# Patient Record
Sex: FEMALE | Race: OTHER | HISPANIC OR LATINO | URBAN - METROPOLITAN AREA
[De-identification: names, ages, dates, MRNs, and addresses within clinical notes are randomized per-mention and may not be internally consistent; named-entity substitution may affect disease eponyms.]

---

## 2021-07-27 ENCOUNTER — INPATIENT (INPATIENT)
Facility: HOSPITAL | Age: 71
LOS: 16 days | Discharge: HOSPICE | DRG: 698 | End: 2021-08-13
Attending: INTERNAL MEDICINE | Admitting: INTERNAL MEDICINE
Payer: MEDICARE

## 2021-07-27 VITALS
DIASTOLIC BLOOD PRESSURE: 77 MMHG | HEART RATE: 101 BPM | SYSTOLIC BLOOD PRESSURE: 166 MMHG | TEMPERATURE: 98 F | HEIGHT: 59 IN | WEIGHT: 106.04 LBS | RESPIRATION RATE: 16 BRPM | OXYGEN SATURATION: 96 %

## 2021-07-27 DIAGNOSIS — D64.9 ANEMIA, UNSPECIFIED: ICD-10-CM

## 2021-07-27 LAB
ALBUMIN SERPL ELPH-MCNC: 3.6 G/DL — SIGNIFICANT CHANGE UP (ref 3.3–5)
ALP SERPL-CCNC: 164 U/L — HIGH (ref 40–120)
ALT FLD-CCNC: 19 U/L — SIGNIFICANT CHANGE UP (ref 10–45)
ANION GAP SERPL CALC-SCNC: 15 MMOL/L — SIGNIFICANT CHANGE UP (ref 5–17)
APPEARANCE UR: ABNORMAL
APTT BLD: 31.4 SEC — SIGNIFICANT CHANGE UP (ref 27.5–35.5)
AST SERPL-CCNC: 35 U/L — SIGNIFICANT CHANGE UP (ref 10–40)
BACTERIA # UR AUTO: NEGATIVE — SIGNIFICANT CHANGE UP
BASOPHILS # BLD AUTO: 0 K/UL — SIGNIFICANT CHANGE UP (ref 0–0.2)
BASOPHILS NFR BLD AUTO: 0 % — SIGNIFICANT CHANGE UP (ref 0–2)
BILIRUB SERPL-MCNC: 0.1 MG/DL — LOW (ref 0.2–1.2)
BILIRUB UR-MCNC: NEGATIVE — SIGNIFICANT CHANGE UP
BLD GP AB SCN SERPL QL: NEGATIVE — SIGNIFICANT CHANGE UP
BUN SERPL-MCNC: 29 MG/DL — HIGH (ref 7–23)
CALCIUM SERPL-MCNC: 9.8 MG/DL — SIGNIFICANT CHANGE UP (ref 8.4–10.5)
CHLORIDE SERPL-SCNC: 99 MMOL/L — SIGNIFICANT CHANGE UP (ref 96–108)
CO2 SERPL-SCNC: 19 MMOL/L — LOW (ref 22–31)
COLOR SPEC: ABNORMAL
CREAT SERPL-MCNC: 1.37 MG/DL — HIGH (ref 0.5–1.3)
DIFF PNL FLD: ABNORMAL
EOSINOPHIL # BLD AUTO: 0.1 K/UL — SIGNIFICANT CHANGE UP (ref 0–0.5)
EOSINOPHIL NFR BLD AUTO: 3 % — SIGNIFICANT CHANGE UP (ref 0–6)
EPI CELLS # UR: 3 /HPF — SIGNIFICANT CHANGE UP
GAS PNL BLDV: SIGNIFICANT CHANGE UP
GLUCOSE SERPL-MCNC: 171 MG/DL — HIGH (ref 70–99)
GLUCOSE UR QL: ABNORMAL
HCT VFR BLD CALC: 13.5 % — CRITICAL LOW (ref 34.5–45)
HGB BLD-MCNC: 4.3 G/DL — CRITICAL LOW (ref 11.5–15.5)
HYALINE CASTS # UR AUTO: 465 /LPF — HIGH (ref 0–2)
INR BLD: 1.06 RATIO — SIGNIFICANT CHANGE UP (ref 0.88–1.16)
KETONES UR-MCNC: NEGATIVE — SIGNIFICANT CHANGE UP
LEUKOCYTE ESTERASE UR-ACNC: ABNORMAL
LYMPHOCYTES # BLD AUTO: 0.39 K/UL — LOW (ref 1–3.3)
LYMPHOCYTES # BLD AUTO: 12 % — LOW (ref 13–44)
MCHC RBC-ENTMCNC: 31.9 GM/DL — LOW (ref 32–36)
MCHC RBC-ENTMCNC: 33.1 PG — SIGNIFICANT CHANGE UP (ref 27–34)
MCV RBC AUTO: 103.8 FL — HIGH (ref 80–100)
MONOCYTES # BLD AUTO: 0.52 K/UL — SIGNIFICANT CHANGE UP (ref 0–0.9)
MONOCYTES NFR BLD AUTO: 16 % — HIGH (ref 2–14)
NEUTROPHILS # BLD AUTO: 2.22 K/UL — SIGNIFICANT CHANGE UP (ref 1.8–7.4)
NEUTROPHILS NFR BLD AUTO: 67 % — SIGNIFICANT CHANGE UP (ref 43–77)
NITRITE UR-MCNC: NEGATIVE — SIGNIFICANT CHANGE UP
PH UR: 8.5 — HIGH (ref 5–8)
PLATELET # BLD AUTO: 147 K/UL — LOW (ref 150–400)
POTASSIUM SERPL-MCNC: 5.3 MMOL/L — SIGNIFICANT CHANGE UP (ref 3.5–5.3)
POTASSIUM SERPL-SCNC: 5.3 MMOL/L — SIGNIFICANT CHANGE UP (ref 3.5–5.3)
PROT SERPL-MCNC: 7.9 G/DL — SIGNIFICANT CHANGE UP (ref 6–8.3)
PROT UR-MCNC: ABNORMAL
PROTHROM AB SERPL-ACNC: 12.7 SEC — SIGNIFICANT CHANGE UP (ref 10.6–13.6)
RBC # BLD: 1.3 M/UL — LOW (ref 3.8–5.2)
RBC # FLD: 20.8 % — HIGH (ref 10.3–14.5)
RBC CASTS # UR COMP ASSIST: 3591 /HPF — HIGH (ref 0–4)
RH IG SCN BLD-IMP: POSITIVE — SIGNIFICANT CHANGE UP
SARS-COV-2 RNA SPEC QL NAA+PROBE: SIGNIFICANT CHANGE UP
SODIUM SERPL-SCNC: 133 MMOL/L — LOW (ref 135–145)
SP GR SPEC: 1.02 — SIGNIFICANT CHANGE UP (ref 1.01–1.02)
UROBILINOGEN FLD QL: NEGATIVE — SIGNIFICANT CHANGE UP
WBC # BLD: 3.22 K/UL — LOW (ref 3.8–10.5)
WBC # FLD AUTO: 3.22 K/UL — LOW (ref 3.8–10.5)
WBC UR QL: 20 /HPF — HIGH (ref 0–5)

## 2021-07-27 PROCEDURE — 74178 CT ABD&PLV WO CNTR FLWD CNTR: CPT | Mod: 26

## 2021-07-27 PROCEDURE — 76830 TRANSVAGINAL US NON-OB: CPT | Mod: 26

## 2021-07-27 PROCEDURE — 93010 ELECTROCARDIOGRAM REPORT: CPT

## 2021-07-27 PROCEDURE — 99222 1ST HOSP IP/OBS MODERATE 55: CPT

## 2021-07-27 PROCEDURE — 51700 IRRIGATION OF BLADDER: CPT

## 2021-07-27 PROCEDURE — 99285 EMERGENCY DEPT VISIT HI MDM: CPT | Mod: 25

## 2021-07-27 PROCEDURE — 76856 US EXAM PELVIC COMPLETE: CPT | Mod: 26

## 2021-07-27 RX ORDER — LATANOPROST 0.05 MG/ML
1 SOLUTION/ DROPS OPHTHALMIC; TOPICAL AT BEDTIME
Refills: 0 | Status: DISCONTINUED | OUTPATIENT
Start: 2021-07-27 | End: 2021-08-13

## 2021-07-27 RX ORDER — VENLAFAXINE HCL 75 MG
75 CAPSULE, EXT RELEASE 24 HR ORAL DAILY
Refills: 0 | Status: DISCONTINUED | OUTPATIENT
Start: 2021-07-27 | End: 2021-07-30

## 2021-07-27 RX ORDER — POLYETHYLENE GLYCOL 3350 17 G/17G
17 POWDER, FOR SOLUTION ORAL DAILY
Refills: 0 | Status: DISCONTINUED | OUTPATIENT
Start: 2021-07-27 | End: 2021-07-30

## 2021-07-27 RX ORDER — ACETAMINOPHEN 500 MG
2 TABLET ORAL
Qty: 0 | Refills: 0 | DISCHARGE

## 2021-07-27 RX ORDER — DEXTROSE 50 % IN WATER 50 %
25 SYRINGE (ML) INTRAVENOUS ONCE
Refills: 0 | Status: DISCONTINUED | OUTPATIENT
Start: 2021-07-27 | End: 2021-08-13

## 2021-07-27 RX ORDER — GLUCAGON INJECTION, SOLUTION 0.5 MG/.1ML
1 INJECTION, SOLUTION SUBCUTANEOUS ONCE
Refills: 0 | Status: DISCONTINUED | OUTPATIENT
Start: 2021-07-27 | End: 2021-08-13

## 2021-07-27 RX ORDER — DEXTROSE 50 % IN WATER 50 %
15 SYRINGE (ML) INTRAVENOUS ONCE
Refills: 0 | Status: DISCONTINUED | OUTPATIENT
Start: 2021-07-27 | End: 2021-08-13

## 2021-07-27 RX ORDER — ACETAMINOPHEN 500 MG
650 TABLET ORAL EVERY 6 HOURS
Refills: 0 | Status: DISCONTINUED | OUTPATIENT
Start: 2021-07-27 | End: 2021-08-13

## 2021-07-27 RX ORDER — POLYETHYLENE GLYCOL 3350 17 G/17G
17 POWDER, FOR SOLUTION ORAL
Qty: 0 | Refills: 0 | DISCHARGE

## 2021-07-27 RX ORDER — MIRTAZAPINE 45 MG/1
15 TABLET, ORALLY DISINTEGRATING ORAL AT BEDTIME
Refills: 0 | Status: DISCONTINUED | OUTPATIENT
Start: 2021-07-27 | End: 2021-07-30

## 2021-07-27 RX ORDER — PANTOPRAZOLE SODIUM 20 MG/1
1 TABLET, DELAYED RELEASE ORAL
Qty: 0 | Refills: 0 | DISCHARGE

## 2021-07-27 RX ORDER — SODIUM CHLORIDE 9 MG/ML
1000 INJECTION, SOLUTION INTRAVENOUS
Refills: 0 | Status: DISCONTINUED | OUTPATIENT
Start: 2021-07-27 | End: 2021-08-13

## 2021-07-27 RX ORDER — DEXTROSE 50 % IN WATER 50 %
12.5 SYRINGE (ML) INTRAVENOUS ONCE
Refills: 0 | Status: DISCONTINUED | OUTPATIENT
Start: 2021-07-27 | End: 2021-08-13

## 2021-07-27 RX ORDER — INSULIN GLARGINE 100 [IU]/ML
10 INJECTION, SOLUTION SUBCUTANEOUS AT BEDTIME
Refills: 0 | Status: DISCONTINUED | OUTPATIENT
Start: 2021-07-28 | End: 2021-08-02

## 2021-07-27 RX ORDER — MIDODRINE HYDROCHLORIDE 2.5 MG/1
10 TABLET ORAL THREE TIMES A DAY
Refills: 0 | Status: DISCONTINUED | OUTPATIENT
Start: 2021-07-27 | End: 2021-07-30

## 2021-07-27 RX ORDER — SUCRALFATE 1 G
1 TABLET ORAL
Refills: 0 | Status: DISCONTINUED | OUTPATIENT
Start: 2021-07-27 | End: 2021-07-30

## 2021-07-27 RX ORDER — PANTOPRAZOLE SODIUM 20 MG/1
40 TABLET, DELAYED RELEASE ORAL
Refills: 0 | Status: DISCONTINUED | OUTPATIENT
Start: 2021-07-27 | End: 2021-07-30

## 2021-07-27 RX ORDER — BRIMONIDINE TARTRATE 2 MG/MG
1 SOLUTION/ DROPS OPHTHALMIC
Qty: 0 | Refills: 0 | DISCHARGE

## 2021-07-27 RX ORDER — BRIMONIDINE TARTRATE 2 MG/MG
1 SOLUTION/ DROPS OPHTHALMIC
Refills: 0 | Status: DISCONTINUED | OUTPATIENT
Start: 2021-07-27 | End: 2021-08-13

## 2021-07-27 RX ORDER — BIMATOPROST 0.3 MG/ML
1 SOLUTION/ DROPS OPHTHALMIC
Qty: 0 | Refills: 0 | DISCHARGE

## 2021-07-27 RX ORDER — INSULIN LISPRO 100/ML
VIAL (ML) SUBCUTANEOUS
Refills: 0 | Status: DISCONTINUED | OUTPATIENT
Start: 2021-07-27 | End: 2021-07-30

## 2021-07-27 NOTE — CONSULT NOTE ADULT - ASSESSMENT
69 yo Female Jehovah witness with PMHx of breast CA s/p BL mastectomy with recurrence and metastasis here with c/o vaginal bleeding, found to have bladder hematoma on pelvic ultrasound. Pt also with R sided nephU with mild hydro, but pt unsure indication for nephU. H/H 4.3/13.5, SCr 1.37. Pt follows up at [facility] and is currently receiving chemotherapy.     Recs:  - no acute urologic intervention at this time  - please obtain CT urogram  - trend H/H, SCr  - will follow   71 yo Female Jehovah witness with PMHx of breast CA s/p BL mastectomy with recurrence and metastasis here with c/o vaginal bleeding, found to have bladder hematoma on pelvic ultrasound. Pt also with R sided nephU with mild hydro, but pt unsure indication for nephU. H/H 4.3/13.5, SCr 1.37. Pt follows up at [facility] and is currently receiving chemotherapy.     Recs:  - no acute urologic intervention at this time  - please obtain CT urogram  - trend H/H, SCr  - attempt to obtain records from [facility]  - will follow   71 yo Female Jehovah witness with PMHx of breast CA s/p BL mastectomy with recurrence and metastasis here with c/o vaginal bleeding, found to have bladder hematoma on pelvic ultrasound. Pt also with R sided nephU with mild hydro, but pt unsure indication for nephU. H/H 4.3/13.5, SCr 1.37. Pt follows up at [facility] and is currently receiving chemotherapy.     Recs:  - no acute urologic intervention at this time  - please obtain CT urogram  - trend H/H, SCr  - continue to monitor urine color  - bladder scan to ensure adequate drainage  - attempt to obtain records from [facility]  - will follow

## 2021-07-27 NOTE — ED PROVIDER NOTE - ATTENDING CONTRIBUTION TO CARE
attending Doris: 70yF Yazdanism, h/o breast Ca on chemo, anemia p/w reported vaginal bleeding. Unclear if vaginal or urinary source. Reportedly with hemoglobin of 4, seen at OSH for same and refused blood products. Will obtain labs, TVUS and admit.

## 2021-07-27 NOTE — H&P ADULT - NSHPPHYSICALEXAM_GEN_ALL_CORE
T(F): 99 (07-27-21 @ 20:31), Max: 99 (07-27-21 @ 20:31)  HR: 102 (07-27-21 @ 20:31) (100 - 102)  BP: 137/62 (07-27-21 @ 20:31) (98/70 - 166/77)  RR: 21 (07-27-21 @ 20:31) (16 - 21)  SpO2: 99% (07-27-21 @ 20:31) (96% - 100%)      PHYSICAL EXAM:  GENERAL: NAD, well-developed  HEAD:  Atraumatic, Normocephalic  EYES: EOMI, PERRLA, conjunctiva and sclera clear  NECK: Supple, No JVD  CHEST/LUNG: Clear to auscultation bilaterally; No wheeze  HEART: Regular rate and rhythm; No murmurs, rubs, or gallops  ABDOMEN: Soft, Nontender, Nondistended; Bowel sounds present  EXTREMITIES:  2+ Peripheral Pulses, No clubbing, cyanosis, or edema  PSYCH: AAOx3  NEUROLOGY: non-focal  SKIN: No rashes or lesions

## 2021-07-27 NOTE — CONSULT NOTE ADULT - SUBJECTIVE AND OBJECTIVE BOX
HPI:  Patient is a 70y Female Jehovah witness who 3 weeks ago started having what she thought was vaginal bleeding. She then states she went to [facility] recently but was discharged ; states they did not do any workup.  Her Hgb in the ER today is 4.3.  Patient had pelvic sono done today which showed no vaginal source but + hematoma in the bladder. The patient has been voiding out clots today.  The patient also has a right nephroureterostomy tube that is clamped, and she does not know why she has it, only that it was placed in March.    The patient is a very poor historian. She does relay some past history of having breast cancer that required mastectomies in 1997 and ?2009, then had a recurrence (? mets to lung) in 2017.  She also states that there was some spread to her abdomen, and has been getting chemo treatment at [facility].          PAST MEDICAL & SURGICAL HISTORY:   Breast cancer, s/p mastectomies, chemo    MEDICATIONS  (STANDING): unknown     MEDICATIONS  (PRN):    FAMILY HISTORY: noncontributory     Allergies    No Known Allergies    Intolerances      SOCIAL HISTORY:   Tobacco hx: denies smoking     REVIEW OF SYSTEMS: Pertinent positives and negatives as stated in HPI, otherwise negative    Vital signs  T(C): 36.7 (07-27-21 @ 15:32), Max: 36.7 (07-27-21 @ 14:35)  HR: 102 (07-27-21 @ 17:54)  BP: 98/70 (07-27-21 @ 17:54)  SpO2: 98% (07-27-21 @ 17:54)  Wt(kg): --      Physical Exam  Gen: NAD  Pulm: No respiratory distress, no subcostal retractions  Abd: Soft, NT, ND  : 6 eye catheter placed, no residual urine, irrigated with NS, about 25 cc of clot, otherwise urine clear  MSK: No edema present    LABS:        07-27 @ 15:39    WBC 3.22  / Hct 13.5  / SCr 1.37     07-27    133<L>  |  99  |  29<H>  ----------------------------<  171<H>  5.3   |  19<L>  |  1.37<H>    Ca    9.8      27 Jul 2021 15:39    TPro  7.9  /  Alb  3.6  /  TBili  0.1<L>  /  DBili  x   /  AST  35  /  ALT  19  /  AlkPhos  164<H>  07-27          Urine Cx:   Blood Cx:    RADIOLOGY:    < from: US Pelvis Complete (07.27.21 @ 16:20) >  IMPRESSION:    Hematoma and layering debris within the bladder in the context of right-sided nephroureterostomy and mild hydronephrosis.    No intrauterine pathology.    < end of copied text >   HPI:  Patient is a 70y Female Jehovah witness who 3 weeks ago started having what she thought was vaginal bleeding. She then states she went to [facility] recently but was discharged ; states they did not do any workup.  Her Hgb in the ER today is 4.3.  Patient had pelvic sono done today which showed no vaginal source but + hematoma in the bladder. The patient has been voiding out clots today.  The patient also has a right nephroureterostomy tube that is clamped, and she does not know why she has it, only that it was placed in March.    The patient is a very poor historian. She does relay some past history of having breast cancer that required mastectomies in 1997 and ?2009, then had a recurrence (? mets to lung) in 2017.  She also states that there was some spread to her abdomen, and has been getting chemo treatment at [facility].          PAST MEDICAL & SURGICAL HISTORY:   Breast cancer, s/p mastectomies, chemo    MEDICATIONS  (STANDING): unknown     MEDICATIONS  (PRN):    FAMILY HISTORY: noncontributory     Allergies    No Known Allergies    Intolerances      SOCIAL HISTORY:   Tobacco hx: denies smoking     REVIEW OF SYSTEMS: Pertinent positives and negatives as stated in HPI, otherwise negative    Vital signs  T(C): 36.7 (07-27-21 @ 15:32), Max: 36.7 (07-27-21 @ 14:35)  HR: 102 (07-27-21 @ 17:54)  BP: 98/70 (07-27-21 @ 17:54)  SpO2: 98% (07-27-21 @ 17:54)  Wt(kg): --      Physical Exam  Gen: NAD  Pulm: No respiratory distress, no subcostal retractions  Abd: Soft, NT, ND  : 6 eye catheter placed, no residual urine, irrigated with NS, about 25 cc of clot, otherwise urine clear  MSK: No edema present    LABS:        07-27 @ 15:39    WBC 3.22  / Hct 13.5  / SCr 1.37     07-27    133<L>  |  99  |  29<H>  ----------------------------<  171<H>  5.3   |  19<L>  |  1.37<H>    Ca    9.8      27 Jul 2021 15:39    TPro  7.9  /  Alb  3.6  /  TBili  0.1<L>  /  DBili  x   /  AST  35  /  ALT  19  /  AlkPhos  164<H>  07-27          Urine Cx:   Blood Cx:    RADIOLOGY:    < from: US Pelvis Complete (07.27.21 @ 16:20) >  IMPRESSION:    Hematoma and layering debris within the bladder in the context of right-sided nephroureterostomy and mild hydronephrosis.    No intrauterine pathology.    < end of copied text >    CT Urogram pending

## 2021-07-27 NOTE — ED ADULT NURSE NOTE - OBJECTIVE STATEMENT
Female 70 years old with past medical history of Anemia and Breast Cancer was brought in by EMS from [facility] for abnormal labs. Pt reports vaginal bleeding for 3 weeks, was admitted in [facility] with hgb of 5.7. Pt didn't want any blood transfusion because of her Gnosticist Jehova's Witness.. States they treat her with Epogen. Today she felt very weak, dizzy and short of breathe. Had vaginal bleeding with blood clots. Denies chest pain, fever, chills. nausea, vomiting or abdominal pain. This morning she had blood works done with hgb level of 4.5. Seen by MD. Will continue to reassess.

## 2021-07-27 NOTE — ED PROVIDER NOTE - CLINICAL SUMMARY MEDICAL DECISION MAKING FREE TEXT BOX
71 yo F Confucianism, hx of breast Ca on active chemo (unsure of name) and anemia p/w reported vaginal bleeding. Unclear if vaginal or urinary source. Unable to receive blood products despite Hgb of 4. Plan for repeat labs, TVUS and admit.

## 2021-07-27 NOTE — ED PROVIDER NOTE - NS ED ROS FT
Constitutional:  (-) fever, (-) chills, (+) fatigue.   Eyes:  (-) eye pain (-) visual changes.  ENMT: (-) nasal discharge, (-) sore throat. (-) neck pain or stiffness.  Cardiac: (-) chest pain (-) palpitations.  Respiratory:  (-) cough (-) shortness of breath.  GI:  (-) nausea (-) vomiting (-) diarrhea (-) abdominal pain.  :  (-) dysuria (-) frequency (-) burning.  MS:  (-) back pain (-) joint pain.  Neuro:  (-) headache (-) numbness (-) tingling (-) focal weakness.  Skin:  (-) rash.  Except as documented in the HPI,  all other systems are negative.

## 2021-07-27 NOTE — ED ADULT NURSE REASSESSMENT NOTE - NS ED NURSE REASSESS COMMENT FT1
Report received from KAYLEE cheema. Pt a & o x 4 , able to follow commands. Breathing spontaneous & nonlabored. Abdomen soft & nondistended. MD Starkey at bedside placing ultrasound guided IV. Urology MD at bedside discussing with patient and her HCP over the phone that pt does not want blood transfusion. Call bell within reach, bed in lowest position.

## 2021-07-27 NOTE — ED PROVIDER NOTE - PROGRESS NOTE DETAILS
Attending Philip: received sign out on this pt from Dr. George. 71 y/o F Jehova witness w/ hx of anemia presenting w/ concern for vaginal bleeding. TVUS today showing hematoma and debris in bladder w/ R sided nephrostomy tube. Uterus overall unremarkable. Resident speaking w/ urology now. Hb 4.3 today

## 2021-07-27 NOTE — H&P ADULT - HISTORY OF PRESENT ILLNESS
71 yo F Anabaptism, hx of breast Ca on active chemo (unsure of name) and anemia p/w reported vaginal bleeding. Recent admission to [facility] for the same, underwent EPO treatment and discharged. Reported Hgb of 5 during admission. Feeling generally weak, denies chest pain sob. Reports bright red blood in the toilet when she urinates. Denies dysuria, abdominal pain. Patient is unable to receive blood products of any kind.

## 2021-07-27 NOTE — ED PROVIDER NOTE - PHYSICAL EXAMINATION
Gen: NAD, AAOx3, pale non-toxic appearing.  HEENT: NCAT, normal conjunctiva, oral mucosa moist.  Lung: speaking in full sentences, good aeration bilaterally. lungs CTA b/l.  CV: regular rate and rhythm. cap refill >2+. peripheral pulses 2+bilaterally.  Abd: soft, ND, NT.  MSK: no visible deformities.  Neuro: No focal deficits.  Skin: Warm.  Psych: normal affect.

## 2021-07-28 LAB
A1C WITH ESTIMATED AVERAGE GLUCOSE RESULT: 6.5 % — HIGH (ref 4–5.6)
ANION GAP SERPL CALC-SCNC: 12 MMOL/L — SIGNIFICANT CHANGE UP (ref 5–17)
BUN SERPL-MCNC: 30 MG/DL — HIGH (ref 7–23)
CALCIUM SERPL-MCNC: 9.4 MG/DL — SIGNIFICANT CHANGE UP (ref 8.4–10.5)
CHLORIDE SERPL-SCNC: 96 MMOL/L — SIGNIFICANT CHANGE UP (ref 96–108)
CO2 SERPL-SCNC: 22 MMOL/L — SIGNIFICANT CHANGE UP (ref 22–31)
COVID-19 SPIKE DOMAIN AB INTERP: NEGATIVE — SIGNIFICANT CHANGE UP
COVID-19 SPIKE DOMAIN ANTIBODY RESULT: 0.4 U/ML — SIGNIFICANT CHANGE UP
CREAT SERPL-MCNC: 1.44 MG/DL — HIGH (ref 0.5–1.3)
ESTIMATED AVERAGE GLUCOSE: 140 MG/DL — HIGH (ref 68–114)
FERRITIN SERPL-MCNC: 492 NG/ML — HIGH (ref 15–150)
GLUCOSE BLDC GLUCOMTR-MCNC: 220 MG/DL — HIGH (ref 70–99)
GLUCOSE BLDC GLUCOMTR-MCNC: 242 MG/DL — HIGH (ref 70–99)
GLUCOSE BLDC GLUCOMTR-MCNC: 252 MG/DL — HIGH (ref 70–99)
GLUCOSE BLDC GLUCOMTR-MCNC: 286 MG/DL — HIGH (ref 70–99)
GLUCOSE SERPL-MCNC: 163 MG/DL — HIGH (ref 70–99)
HCT VFR BLD CALC: 11.2 % — CRITICAL LOW (ref 34.5–45)
HCV AB S/CO SERPL IA: 0.15 S/CO — SIGNIFICANT CHANGE UP (ref 0–0.99)
HCV AB SERPL-IMP: SIGNIFICANT CHANGE UP
HGB BLD-MCNC: 3.4 G/DL — CRITICAL LOW (ref 11.5–15.5)
IRON SATN MFR SERPL: 64 UG/DL — SIGNIFICANT CHANGE UP (ref 30–160)
MCHC RBC-ENTMCNC: 30.4 GM/DL — LOW (ref 32–36)
MCHC RBC-ENTMCNC: 32.7 PG — SIGNIFICANT CHANGE UP (ref 27–34)
MCV RBC AUTO: 107.7 FL — HIGH (ref 80–100)
NRBC # BLD: 0 /100 WBCS — SIGNIFICANT CHANGE UP (ref 0–0)
PLATELET # BLD AUTO: 122 K/UL — LOW (ref 150–400)
POTASSIUM SERPL-MCNC: 4.1 MMOL/L — SIGNIFICANT CHANGE UP (ref 3.5–5.3)
POTASSIUM SERPL-SCNC: 4.1 MMOL/L — SIGNIFICANT CHANGE UP (ref 3.5–5.3)
RBC # BLD: 1.04 M/UL — LOW (ref 3.8–5.2)
RBC # FLD: 21.1 % — HIGH (ref 10.3–14.5)
SARS-COV-2 IGG+IGM SERPL QL IA: 0.4 U/ML — SIGNIFICANT CHANGE UP
SARS-COV-2 IGG+IGM SERPL QL IA: NEGATIVE — SIGNIFICANT CHANGE UP
SODIUM SERPL-SCNC: 130 MMOL/L — LOW (ref 135–145)
WBC # BLD: 3.08 K/UL — LOW (ref 3.8–10.5)
WBC # FLD AUTO: 3.08 K/UL — LOW (ref 3.8–10.5)

## 2021-07-28 PROCEDURE — 99231 SBSQ HOSP IP/OBS SF/LOW 25: CPT

## 2021-07-28 PROCEDURE — 71250 CT THORAX DX C-: CPT | Mod: 26

## 2021-07-28 PROCEDURE — 51703 INSERT BLADDER CATH COMPLEX: CPT

## 2021-07-28 RX ORDER — ERYTHROPOIETIN 10000 [IU]/ML
40000 INJECTION, SOLUTION INTRAVENOUS; SUBCUTANEOUS ONCE
Refills: 0 | Status: COMPLETED | OUTPATIENT
Start: 2021-07-28 | End: 2021-07-28

## 2021-07-28 RX ORDER — OXYCODONE HYDROCHLORIDE 5 MG/1
5 TABLET ORAL EVERY 4 HOURS
Refills: 0 | Status: DISCONTINUED | OUTPATIENT
Start: 2021-07-28 | End: 2021-07-28

## 2021-07-28 RX ORDER — INSULIN LISPRO 100/ML
VIAL (ML) SUBCUTANEOUS AT BEDTIME
Refills: 0 | Status: DISCONTINUED | OUTPATIENT
Start: 2021-07-28 | End: 2021-07-30

## 2021-07-28 RX ORDER — IRON SUCROSE 20 MG/ML
200 INJECTION, SOLUTION INTRAVENOUS EVERY 24 HOURS
Refills: 0 | Status: COMPLETED | OUTPATIENT
Start: 2021-07-28 | End: 2021-08-01

## 2021-07-28 RX ORDER — ERYTHROMYCIN ETHYLSUCCINATE 400 MG
TABLET ORAL
Refills: 0 | Status: DISCONTINUED | OUTPATIENT
Start: 2021-07-28 | End: 2021-07-28

## 2021-07-28 RX ORDER — OXYCODONE HYDROCHLORIDE 5 MG/1
5 TABLET ORAL ONCE
Refills: 0 | Status: DISCONTINUED | OUTPATIENT
Start: 2021-07-28 | End: 2021-07-28

## 2021-07-28 RX ADMIN — MIDODRINE HYDROCHLORIDE 10 MILLIGRAM(S): 2.5 TABLET ORAL at 05:27

## 2021-07-28 RX ADMIN — Medication 3: at 13:19

## 2021-07-28 RX ADMIN — OXYCODONE HYDROCHLORIDE 5 MILLIGRAM(S): 5 TABLET ORAL at 14:44

## 2021-07-28 RX ADMIN — OXYCODONE HYDROCHLORIDE 5 MILLIGRAM(S): 5 TABLET ORAL at 22:52

## 2021-07-28 RX ADMIN — Medication 2: at 08:28

## 2021-07-28 RX ADMIN — OXYCODONE HYDROCHLORIDE 5 MILLIGRAM(S): 5 TABLET ORAL at 23:30

## 2021-07-28 RX ADMIN — PANTOPRAZOLE SODIUM 40 MILLIGRAM(S): 20 TABLET, DELAYED RELEASE ORAL at 05:28

## 2021-07-28 RX ADMIN — MIDODRINE HYDROCHLORIDE 10 MILLIGRAM(S): 2.5 TABLET ORAL at 11:10

## 2021-07-28 RX ADMIN — MIDODRINE HYDROCHLORIDE 10 MILLIGRAM(S): 2.5 TABLET ORAL at 17:49

## 2021-07-28 RX ADMIN — ERYTHROPOIETIN 40000 UNIT(S): 10000 INJECTION, SOLUTION INTRAVENOUS; SUBCUTANEOUS at 16:01

## 2021-07-28 RX ADMIN — Medication 75 MILLIGRAM(S): at 11:10

## 2021-07-28 RX ADMIN — Medication 3: at 18:18

## 2021-07-28 RX ADMIN — IRON SUCROSE 110 MILLIGRAM(S): 20 INJECTION, SOLUTION INTRAVENOUS at 16:01

## 2021-07-28 RX ADMIN — BRIMONIDINE TARTRATE 1 DROP(S): 2 SOLUTION/ DROPS OPHTHALMIC at 05:27

## 2021-07-28 RX ADMIN — Medication 1 GRAM(S): at 22:18

## 2021-07-28 RX ADMIN — Medication 1 GRAM(S): at 11:10

## 2021-07-28 RX ADMIN — Medication 1 GRAM(S): at 05:27

## 2021-07-28 RX ADMIN — BRIMONIDINE TARTRATE 1 DROP(S): 2 SOLUTION/ DROPS OPHTHALMIC at 17:50

## 2021-07-28 RX ADMIN — Medication 650 MILLIGRAM(S): at 11:09

## 2021-07-28 RX ADMIN — MIRTAZAPINE 15 MILLIGRAM(S): 45 TABLET, ORALLY DISINTEGRATING ORAL at 22:18

## 2021-07-28 RX ADMIN — LATANOPROST 1 DROP(S): 0.05 SOLUTION/ DROPS OPHTHALMIC; TOPICAL at 22:19

## 2021-07-28 RX ADMIN — INSULIN GLARGINE 10 UNIT(S): 100 INJECTION, SOLUTION SUBCUTANEOUS at 22:18

## 2021-07-28 RX ADMIN — Medication 650 MILLIGRAM(S): at 02:24

## 2021-07-28 NOTE — PROGRESS NOTE ADULT - SUBJECTIVE AND OBJECTIVE BOX
Delayed entry progress note -- patient seen and examined ~6-7am    Subjective  Denies fevers, chills. CBI on slow drip clear light red. Denies pain.  Hct 11.2 from 13.5    Objective    Vital signs  T(F): , Max: 99 (07-27-21 @ 20:31)  HR: 99 (07-28-21 @ 05:24)  BP: 117/60 (07-28-21 @ 05:24)  SpO2: 97% (07-28-21 @ 05:24)  Wt(kg): --    Output         Gen: NAD  Abd: soft, nontender, nondistended  : diaz secured in place, draining clear light red on slow gtt CBI    Labs      07-28 @ 07:28    WBC --    / Hct --    / SCr 1.44     07-28 @ 07:27    WBC 3.08  / Hct 11.2  / SCr --             Urine Cx: pending    Imaging  CTAP read pending

## 2021-07-28 NOTE — PROGRESS NOTE ADULT - ASSESSMENT
69 yo Female Jehovah witness with PMHx of breast CA s/p BL mastectomy with recurrence and metastasis here with c/o vaginal bleeding, found to have bladder hematoma on pelvic ultrasound. Pt also with R sided nephU with mild hydro, but pt unsure indication for nephU.   on admission: H/H 4.3/13.5, SCr 1.37.  Pt follows up at [facility] and is currently receiving chemotherapy.     7/28: Hct 11.2 from 13.5 yesterday. ptn is pale, lethargic, dyspneic, expresses wishes not to have any blood products based on her GD DOV. ptn placed her proxy on the phone Mr. Gray who wanted to confirm the hospital is respecting ptn's wishes. both ptn and the proxy aksed baout being transferred to [facility]. however ptn is not stable w HGB of 3 to be travelling. denies CP, palps, HA, has ongoing hematuria, CBI in place. Ct C/A/P reviewed. ptn w metastatic dz process on CT scan. Heme consult called. will cont CBI as per . pending CT urogram. cont trending HH. started on IV IRON. prognosis is guarded.

## 2021-07-28 NOTE — PROGRESS NOTE ADULT - SUBJECTIVE AND OBJECTIVE BOX
Patient is a 70y old  Female who presents with a chief complaint of severe anemia, acute blood loss 2/2 hematuria (2021 11:54)      SUBJECTIVE / OVERNIGHT EVENTS: ptn is pale, lethargic, dyspneic, expresses wishes not to have any blood products based on her GD JEHOVA. ptn placed her proxy on the phone Mr. Gray who wanted to confirm the hospital is respecting ptn's wishes. both ptn and the proxy aksed micheal being transferred to [facility]. however ptn is not stable w HGB of 3 to be travelling. denies CP, palps, HA, has ongoing hematuria, CBI in place. Ct C/A/P reviewed. ptn w metastatic dz process on CT scan    MEDICATIONS  (STANDING):  brimonidine 0.2% Ophthalmic Solution 1 Drop(s) Both EYES two times a day  dextrose 40% Gel 15 Gram(s) Oral once  dextrose 5%. 1000 milliLiter(s) (50 mL/Hr) IV Continuous <Continuous>  dextrose 5%. 1000 milliLiter(s) (100 mL/Hr) IV Continuous <Continuous>  dextrose 50% Injectable 25 Gram(s) IV Push once  dextrose 50% Injectable 12.5 Gram(s) IV Push once  dextrose 50% Injectable 25 Gram(s) IV Push once  glucagon  Injectable 1 milliGRAM(s) IntraMuscular once  insulin glargine Injectable (LANTUS) 10 Unit(s) SubCutaneous at bedtime  insulin lispro (ADMELOG) corrective regimen sliding scale   SubCutaneous three times a day before meals  insulin lispro (ADMELOG) corrective regimen sliding scale   SubCutaneous at bedtime  iron sucrose IVPB 200 milliGRAM(s) IV Intermittent every 24 hours  latanoprost 0.005% Ophthalmic Solution 1 Drop(s) Both EYES at bedtime  midodrine. 10 milliGRAM(s) Oral three times a day  mirtazapine 15 milliGRAM(s) Oral at bedtime  pantoprazole    Tablet 40 milliGRAM(s) Oral before breakfast  sucralfate 1 Gram(s) Oral four times a day  venlafaxine XR. 75 milliGRAM(s) Oral daily    MEDICATIONS  (PRN):  acetaminophen   Tablet .. 650 milliGRAM(s) Oral every 6 hours PRN Temp greater or equal to 38C (100.4F), Mild Pain (1 - 3)  artificial  tears Solution 1 Drop(s) Both EYES four times a day PRN Dry Eyes  oxyCODONE    IR 5 milliGRAM(s) Oral every 4 hours PRN Moderate Pain (4 - 6)  polyethylene glycol 3350 17 Gram(s) Oral daily PRN Constipation      Vital Signs Last 24 Hrs  T(F): 98.2 (21 @ 20:00), Max: 99.1 (21 @ 15:53)  HR: 93 (21 @ 20:00) (93 - 110)  BP: 157/73 (21 @ 20:00) (117/60 - 157/73)  RR: 19 (21 @ 20:00) (19 - 22)  SpO2: 99% (21 @ 20:00) (97% - 100%)  Telemetry:   CAPILLARY BLOOD GLUCOSE      POCT Blood Glucose.: 220 mg/dL (2021 21:55)  POCT Blood Glucose.: 252 mg/dL (2021 18:05)  POCT Blood Glucose.: 286 mg/dL (2021 12:40)  POCT Blood Glucose.: 242 mg/dL (2021 08:20)    I&O's Summary    2021 07:  -  2021 07:00  --------------------------------------------------------  IN: 400 mL / OUT: 0 mL / NET: 400 mL    2021 07:01  -  2021 23:16  --------------------------------------------------------  IN: 9000 mL / OUT: 25661 mL / NET: -2000 mL        PHYSICAL EXAM:  GENERAL: NAD, well-developed  HEAD:  Atraumatic, Normocephalic  EYES: EOMI, PERRLA, conjunctiva and sclera clear  NECK: Supple, No JVD  CHEST/LUNG: Clear to auscultation bilaterally; No wheeze  HEART: Regular rate and rhythm; No murmurs, rubs, or gallops  ABDOMEN: Soft, Nontender, Nondistended; Bowel sounds present  EXTREMITIES:  2+ Peripheral Pulses, No clubbing, cyanosis, or edema  PSYCH: AAOx3  NEUROLOGY: non-focal  SKIN: No rashes or lesions    LABS:                        3.4    3.08  )-----------( 122      ( 2021 07:27 )             11.2         130<L>  |  96  |  30<H>  ----------------------------<  163<H>  4.1   |  22  |  1.44<H>    Ca    9.4      2021 07:28    TPro  7.9  /  Alb  3.6  /  TBili  0.1<L>  /  DBili  x   /  AST  35  /  ALT  19  /  AlkPhos  164<H>  27    PT/INR - ( 2021 20:37 )   PT: 12.7 sec;   INR: 1.06 ratio         PTT - ( 2021 20:37 )  PTT:31.4 sec      Urinalysis Basic - ( 2021 21:49 )    Color: Red / Appearance: Turbid / S.018 / pH: x  Gluc: x / Ketone: Negative  / Bili: Negative / Urobili: Negative   Blood: x / Protein: 300 mg/dL / Nitrite: Negative   Leuk Esterase: Moderate / RBC: 3591 /hpf / WBC 20 /HPF   Sq Epi: x / Non Sq Epi: 3 /hpf / Bacteria: Negative        RADIOLOGY & ADDITIONAL TESTS:    Imaging Personally Reviewed:    Consultant(s) Notes Reviewed:      Care Discussed with Consultants/Other Providers:

## 2021-07-28 NOTE — PROGRESS NOTE ADULT - ASSESSMENT
71 yo Female Jehovah witness with PMHx of breast CA s/p BL mastectomy with recurrence and metastasis here with c/o vaginal bleeding, found to have bladder hematoma on pelvic ultrasound. Pt also with R sided nephU with mild hydro, but pt unsure indication for nephU. H/H 4.3/13.5, SCr 1.37. Pt follows up at [facility] and is currently receiving chemotherapy.     Hct 11.2 from 13.5 yesterday    Recs:  - continue CBI at this time, wean to clear light pink, manually irrigate clots prn (contact urology if unable to irrigate clot adequately)  - FU CT urogram read  - trend H/H, SCr  - please obtain records from [facility]

## 2021-07-28 NOTE — CONSULT NOTE ADULT - SUBJECTIVE AND OBJECTIVE BOX
HPI: Ms. Packer is a 70 year-old Congregational with history of anemia and breast cancer on chemotherapy, who presented 21 to the [facility] ER with vaginal bleeding. She is s/p recent admission at [facility] for a similar issue and was discharged after receiving a erythropoesis stimulating agent. Her hemoglobin was ~5g/dL at [place]; it is 4.3g/dL upon admission here. She was noted as well to have azotemia and proteinuria; therefore a renal consultation was requested.    PAST MEDICAL & SURGICAL HISTORY:  Anemia  Breast CA - on chemo  No significant past surgical history    Allergies  No Known Allergies    SOCIAL HISTORY:  Denies ETOh,Smoking,     FAMILY HISTORY:  No CKD    REVIEW OF SYSTEMS:  CONSTITUTIONAL: No weakness, fevers or chills  EYES/ENT: No visual changes;  No vertigo or throat pain   NECK: No pain or stiffness  RESPIRATORY: No cough, wheezing, hemoptysis; No shortness of breath  CARDIOVASCULAR: No chest pain or palpitations  GASTROINTESTINAL: No abdominal or epigastric pain. No nausea, vomiting, or hematemesis; No diarrhea or constipation. No melena or hematochezia.  GENITOURINARY: (+)vaginal bleeding  NEUROLOGICAL: No numbness or weakness  SKIN: No itching, burning, rashes, or lesions   All other review of systems is negative unless indicated above.    VITAL:  T(C): , Max: 37.2 (21 @ 20:31)  T(F): , Max: 99 (21 @ 20:31)  HR: 99 (21 @ 05:24)  BP: 117/60 (21 @ 05:24)  BP(mean): 78 (21 @ 00:28)  RR: 20 (21 @ 05:24)  SpO2: 97% (21 @ 05:24)    PHYSICAL EXAM:  Constitutional: NAD, Alert  HEENT: NCAT, MMM  Neck: Supple, No JVD  Respiratory: CTA-b/l  Cardiovascular: RRR s1s2, no m/r/g  Gastrointestinal: BS+, soft, NT/ND  Extremities: No peripheral edema b/l  Neurological: no focal deficits; strength grossly intact  Back: no CVAT b/l  Skin: No rashes, no nevi    LABS:                        4.3    3.22  )-----------( 147      ( 2021 15:39 )             13.5     Na(133)/K(5.3)/Cl(99)/HCO3(19)/BUN(29)/Cr(1.37)Glu(171)/Ca(9.8)/Mg(--)/PO4(--)     @ 15:39    Urinalysis Basic - ( 2021 21:49 )  Color: Red / Appearance: Turbid / S.018 / pH: x  Gluc: x / Ketone: Negative  / Bili: Negative / Urobili: Negative   Blood: x / Protein: 300 mg/dL / Nitrite: Negative   Leuk Esterase: Moderate / RBC: 3591 /hpf / WBC 20 /HPF   Sq Epi: x / Non Sq Epi: 3 /hpf / Bacteria: Negative    IMAGING:  < from: US Pelvis Complete (21 @ 16:20) >  Hematoma and layering debris within the bladder in the context of right-sided nephroureterostomy and mild hydronephrosis.  No intrauterine pathology.      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  (5)Anemia - severe - in setting of vaginal bleeding. She is most certainly iron-deficient. IV iron would likely be a better agent here than an SAWYER in driving up the blood count    RECOMMEND:  (1)Venofer 200mg iv qd  (2)Urine: protein, creatinine  (3)Dose new meds for GFR 40-50ml/min  (4)Minimize blood draws    Thank you for involving [facility] in this patient's care.    With warm regards,    David Nixon MD   [facility]  Office: [phone]  Cell: [phone]             HPI: Ms. Packer is a 70 year-old Nondenominational with history of anemia and breast cancer on chemotherapy, who presented 21 to the [facility] ER with vaginal bleeding. She is s/p recent admission at [facility] for a similar issue and was discharged after receiving a erythropoesis stimulating agent. Her hemoglobin was ~5g/dL at [place]; it is 4.3g/dL upon admission here. She was noted as well to have azotemia and proteinuria; therefore a renal consultation was requested.    Ms. Packer denies known history of CKD/GLEN. Voiding well of late, but with gross hematuria. Now on CBI.    PAST MEDICAL & SURGICAL HISTORY:  Anemia  Breast CA - on chemo  No significant past surgical history    Allergies  No Known Allergies    SOCIAL HISTORY:  Denies ETOh,Smoking,     FAMILY HISTORY:  No CKD    REVIEW OF SYSTEMS:  CONSTITUTIONAL: No weakness, fevers or chills  EYES/ENT: No visual changes;  No vertigo or throat pain   NECK: No pain or stiffness  RESPIRATORY: No cough, wheezing, hemoptysis; No shortness of breath  CARDIOVASCULAR: No chest pain or palpitations  GASTROINTESTINAL: No abdominal or epigastric pain. No nausea, vomiting, or hematemesis; No diarrhea or constipation. No melena or hematochezia.  GENITOURINARY: (+)vaginal bleeding  NEUROLOGICAL: No numbness or weakness  SKIN: No itching, burning, rashes, or lesions   All other review of systems is negative unless indicated above.    VITAL:  T(C): , Max: 37.2 (21 @ 20:31)  T(F): , Max: 99 (21 @ 20:31)  HR: 99 (21 @ 05:24)  BP: 117/60 (21 @ 05:24)  BP(mean): 78 (21 @ 00:28)  RR: 20 (21 @ 05:24)  SpO2: 97% (21 @ 05:24)    PHYSICAL EXAM:  Constitutional: NAD, Alert  HEENT: NCAT, MMM  Neck: Supple, No JVD  Respiratory: CTA-b/l  Cardiovascular: RRR s1s2, no m/r/g  Gastrointestinal: BS+, soft, NT/ND  : diaz-cbi  Extremities: No peripheral edema b/l  Neurological: no focal deficits; strength grossly intact  Back: no CVAT b/l  Skin: No rashes, no nevi    LABS:                        4.3    3.22  )-----------( 147      ( 2021 15:39 )             13.5     Na(133)/K(5.3)/Cl(99)/HCO3(19)/BUN(29)/Cr(1.37)Glu(171)/Ca(9.8)/Mg(--)/PO4(--)     @ 15:39    Urinalysis Basic - ( 2021 21:49 )  Color: Red / Appearance: Turbid / S.018 / pH: x  Gluc: x / Ketone: Negative  / Bili: Negative / Urobili: Negative   Blood: x / Protein: 300 mg/dL / Nitrite: Negative   Leuk Esterase: Moderate / RBC: 3591 /hpf / WBC 20 /HPF   Sq Epi: x / Non Sq Epi: 3 /hpf / Bacteria: Negative    IMAGING:  < from: US Pelvis Complete (21 @ 16:20) >  Hematoma and layering debris within the bladder in the context of right-sided nephroureterostomy and mild hydronephrosis.  No intrauterine pathology.      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  (5)Anemia - severe - in setting of vaginal bleeding. She is most certainly iron-deficient. IV iron would likely be a better agent here than an SAWYER in driving up the blood count    RECOMMEND:  (1)Venofer 200mg iv qd  (2)Urine: protein, creatinine  (3)Dose new meds for GFR 40-50ml/min  (4)Minimize blood draws    Thank you for involving [facility] in this patient's care.    With warm regards,    David Nixon MD   [facility]  Office: [phone]  Cell: [phone]

## 2021-07-28 NOTE — PROCEDURE NOTE - ADDITIONAL PROCEDURE DETAILS
Pt with clot burden on CT urogram, states urinated a little while ago and urinated large sized clots. As per Dr. Kemp, 20F 6 eye was placed under sterile technique and gently irrigated with sterile water with aspiration fo 2-3 small clots, urine is clear. 20F hematuria catheter was then placed and CBI was started on slow drip with clear urine draining. Stat lock applied.

## 2021-07-28 NOTE — PROVIDER CONTACT NOTE (OTHER) - ACTION/TREATMENT ORDERED:
Provider made aware, provider to review CT ab and made recommendations for pain relief. NP also made aware of nephrology recommendation for IV iron but no ordered put in. Awaiting further instructions

## 2021-07-29 DIAGNOSIS — R00.0 TACHYCARDIA, UNSPECIFIED: ICD-10-CM

## 2021-07-29 DIAGNOSIS — R31.9 HEMATURIA, UNSPECIFIED: ICD-10-CM

## 2021-07-29 DIAGNOSIS — D64.9 ANEMIA, UNSPECIFIED: ICD-10-CM

## 2021-07-29 LAB
ALBUMIN SERPL ELPH-MCNC: 3.5 G/DL — SIGNIFICANT CHANGE UP (ref 3.3–5)
ALP SERPL-CCNC: 178 U/L — HIGH (ref 40–120)
ALT FLD-CCNC: 20 U/L — SIGNIFICANT CHANGE UP (ref 10–45)
ANION GAP SERPL CALC-SCNC: 21 MMOL/L — HIGH (ref 5–17)
ANION GAP SERPL CALC-SCNC: 22 MMOL/L — HIGH (ref 5–17)
APTT BLD: 30.2 SEC — SIGNIFICANT CHANGE UP (ref 27.5–35.5)
AST SERPL-CCNC: 32 U/L — SIGNIFICANT CHANGE UP (ref 10–40)
BASE EXCESS BLDV CALC-SCNC: -6.9 MMOL/L — LOW (ref -2–2)
BILIRUB SERPL-MCNC: 0.3 MG/DL — SIGNIFICANT CHANGE UP (ref 0.2–1.2)
BUN SERPL-MCNC: 33 MG/DL — HIGH (ref 7–23)
BUN SERPL-MCNC: 35 MG/DL — HIGH (ref 7–23)
CA-I SERPL-SCNC: 1.2 MMOL/L — SIGNIFICANT CHANGE UP (ref 1.12–1.3)
CALCIUM SERPL-MCNC: 9.7 MG/DL — SIGNIFICANT CHANGE UP (ref 8.4–10.5)
CALCIUM SERPL-MCNC: 9.9 MG/DL — SIGNIFICANT CHANGE UP (ref 8.4–10.5)
CHLORIDE BLDV-SCNC: 104 MMOL/L — SIGNIFICANT CHANGE UP (ref 96–108)
CHLORIDE SERPL-SCNC: 100 MMOL/L — SIGNIFICANT CHANGE UP (ref 96–108)
CHLORIDE SERPL-SCNC: 97 MMOL/L — SIGNIFICANT CHANGE UP (ref 96–108)
CO2 BLDV-SCNC: 19 MMOL/L — LOW (ref 22–30)
CO2 SERPL-SCNC: 12 MMOL/L — LOW (ref 22–31)
CO2 SERPL-SCNC: 14 MMOL/L — LOW (ref 22–31)
CREAT SERPL-MCNC: 1.94 MG/DL — HIGH (ref 0.5–1.3)
CREAT SERPL-MCNC: 2.11 MG/DL — HIGH (ref 0.5–1.3)
GAS PNL BLDV: 136 MMOL/L — SIGNIFICANT CHANGE UP (ref 135–145)
GAS PNL BLDV: SIGNIFICANT CHANGE UP
GAS PNL BLDV: SIGNIFICANT CHANGE UP
GLUCOSE BLDC GLUCOMTR-MCNC: 205 MG/DL — HIGH (ref 70–99)
GLUCOSE BLDC GLUCOMTR-MCNC: 207 MG/DL — HIGH (ref 70–99)
GLUCOSE BLDC GLUCOMTR-MCNC: 209 MG/DL — HIGH (ref 70–99)
GLUCOSE BLDC GLUCOMTR-MCNC: 264 MG/DL — HIGH (ref 70–99)
GLUCOSE BLDC GLUCOMTR-MCNC: 353 MG/DL — HIGH (ref 70–99)
GLUCOSE BLDV-MCNC: 230 MG/DL — HIGH (ref 70–99)
GLUCOSE SERPL-MCNC: 233 MG/DL — HIGH (ref 70–99)
GLUCOSE SERPL-MCNC: 272 MG/DL — HIGH (ref 70–99)
HCO3 BLDV-SCNC: 18 MMOL/L — LOW (ref 21–29)
HCT VFR BLD CALC: 13.6 % — CRITICAL LOW (ref 34.5–45)
HCT VFR BLDA CALC: 13 % — CRITICAL LOW (ref 39–50)
HGB BLD CALC-MCNC: 4 G/DL — CRITICAL LOW (ref 11.5–15.5)
HGB BLD-MCNC: 4.1 G/DL — CRITICAL LOW (ref 11.5–15.5)
INR BLD: 1.14 RATIO — SIGNIFICANT CHANGE UP (ref 0.88–1.16)
LACTATE BLDV-MCNC: 7.4 MMOL/L — CRITICAL HIGH (ref 0.7–2)
MCHC RBC-ENTMCNC: 30.1 GM/DL — LOW (ref 32–36)
MCHC RBC-ENTMCNC: 33.6 PG — SIGNIFICANT CHANGE UP (ref 27–34)
MCV RBC AUTO: 111.5 FL — HIGH (ref 80–100)
NRBC # BLD: 4 /100 WBCS — HIGH (ref 0–0)
OTHER CELLS CSF MANUAL: 1 ML/DL — LOW (ref 18–22)
PCO2 BLDV: 36 MMHG — SIGNIFICANT CHANGE UP (ref 35–50)
PH BLDV: 7.32 — LOW (ref 7.35–7.45)
PLATELET # BLD AUTO: 203 K/UL — SIGNIFICANT CHANGE UP (ref 150–400)
PO2 BLDV: 24 MMHG — LOW (ref 25–45)
POTASSIUM BLDV-SCNC: 4.9 MMOL/L — SIGNIFICANT CHANGE UP (ref 3.5–5.3)
POTASSIUM SERPL-MCNC: 4.7 MMOL/L — SIGNIFICANT CHANGE UP (ref 3.5–5.3)
POTASSIUM SERPL-MCNC: 5.4 MMOL/L — HIGH (ref 3.5–5.3)
POTASSIUM SERPL-SCNC: 4.7 MMOL/L — SIGNIFICANT CHANGE UP (ref 3.5–5.3)
POTASSIUM SERPL-SCNC: 5.4 MMOL/L — HIGH (ref 3.5–5.3)
PROCALCITONIN SERPL-MCNC: 0.32 NG/ML — HIGH (ref 0.02–0.1)
PROT SERPL-MCNC: 8 G/DL — SIGNIFICANT CHANGE UP (ref 6–8.3)
PROTHROM AB SERPL-ACNC: 13.6 SEC — SIGNIFICANT CHANGE UP (ref 10.6–13.6)
RBC # BLD: 1.22 M/UL — LOW (ref 3.8–5.2)
RBC # FLD: 22.6 % — HIGH (ref 10.3–14.5)
SAO2 % BLDV: 23 % — LOW (ref 67–88)
SODIUM SERPL-SCNC: 131 MMOL/L — LOW (ref 135–145)
SODIUM SERPL-SCNC: 135 MMOL/L — SIGNIFICANT CHANGE UP (ref 135–145)
TSH SERPL-MCNC: 2.45 UIU/ML — SIGNIFICANT CHANGE UP (ref 0.27–4.2)
WBC # BLD: 6.88 K/UL — SIGNIFICANT CHANGE UP (ref 3.8–10.5)
WBC # FLD AUTO: 6.88 K/UL — SIGNIFICANT CHANGE UP (ref 3.8–10.5)

## 2021-07-29 PROCEDURE — 99291 CRITICAL CARE FIRST HOUR: CPT | Mod: 25

## 2021-07-29 PROCEDURE — 70450 CT HEAD/BRAIN W/O DYE: CPT | Mod: 26

## 2021-07-29 PROCEDURE — 93010 ELECTROCARDIOGRAM REPORT: CPT

## 2021-07-29 PROCEDURE — 99232 SBSQ HOSP IP/OBS MODERATE 35: CPT | Mod: GC

## 2021-07-29 RX ORDER — LEVETIRACETAM 250 MG/1
500 TABLET, FILM COATED ORAL EVERY 12 HOURS
Refills: 0 | Status: DISCONTINUED | OUTPATIENT
Start: 2021-07-29 | End: 2021-08-13

## 2021-07-29 RX ORDER — TRANEXAMIC ACID 100 MG/ML
1000 INJECTION, SOLUTION INTRAVENOUS ONCE
Refills: 0 | Status: COMPLETED | OUTPATIENT
Start: 2021-07-29 | End: 2021-07-29

## 2021-07-29 RX ORDER — SODIUM CHLORIDE 9 MG/ML
1000 INJECTION INTRAMUSCULAR; INTRAVENOUS; SUBCUTANEOUS ONCE
Refills: 0 | Status: COMPLETED | OUTPATIENT
Start: 2021-07-29 | End: 2021-07-29

## 2021-07-29 RX ORDER — ACETAMINOPHEN 500 MG
650 TABLET ORAL ONCE
Refills: 0 | Status: COMPLETED | OUTPATIENT
Start: 2021-07-29 | End: 2021-07-29

## 2021-07-29 RX ORDER — SODIUM CHLORIDE 9 MG/ML
500 INJECTION INTRAMUSCULAR; INTRAVENOUS; SUBCUTANEOUS ONCE
Refills: 0 | Status: COMPLETED | OUTPATIENT
Start: 2021-07-29 | End: 2021-07-29

## 2021-07-29 RX ORDER — CEFEPIME 1 G/1
1000 INJECTION, POWDER, FOR SOLUTION INTRAMUSCULAR; INTRAVENOUS EVERY 12 HOURS
Refills: 0 | Status: DISCONTINUED | OUTPATIENT
Start: 2021-07-30 | End: 2021-07-30

## 2021-07-29 RX ORDER — ACETAMINOPHEN 500 MG
750 TABLET ORAL ONCE
Refills: 0 | Status: COMPLETED | OUTPATIENT
Start: 2021-07-29 | End: 2021-07-29

## 2021-07-29 RX ORDER — LEVETIRACETAM 250 MG/1
1000 TABLET, FILM COATED ORAL ONCE
Refills: 0 | Status: COMPLETED | OUTPATIENT
Start: 2021-07-29 | End: 2021-07-29

## 2021-07-29 RX ORDER — SODIUM CHLORIDE 9 MG/ML
1000 INJECTION INTRAMUSCULAR; INTRAVENOUS; SUBCUTANEOUS
Refills: 0 | Status: DISCONTINUED | OUTPATIENT
Start: 2021-07-29 | End: 2021-07-30

## 2021-07-29 RX ORDER — VANCOMYCIN HCL 1 G
1250 VIAL (EA) INTRAVENOUS ONCE
Refills: 0 | Status: COMPLETED | OUTPATIENT
Start: 2021-07-29 | End: 2021-07-29

## 2021-07-29 RX ORDER — CEFEPIME 1 G/1
1000 INJECTION, POWDER, FOR SOLUTION INTRAMUSCULAR; INTRAVENOUS ONCE
Refills: 0 | Status: COMPLETED | OUTPATIENT
Start: 2021-07-29 | End: 2021-07-29

## 2021-07-29 RX ORDER — CEFEPIME 1 G/1
INJECTION, POWDER, FOR SOLUTION INTRAMUSCULAR; INTRAVENOUS
Refills: 0 | Status: DISCONTINUED | OUTPATIENT
Start: 2021-07-29 | End: 2021-07-30

## 2021-07-29 RX ADMIN — Medication 300 MILLIGRAM(S): at 17:59

## 2021-07-29 RX ADMIN — SODIUM CHLORIDE 1000 MILLILITER(S): 9 INJECTION INTRAMUSCULAR; INTRAVENOUS; SUBCUTANEOUS at 18:05

## 2021-07-29 RX ADMIN — BRIMONIDINE TARTRATE 1 DROP(S): 2 SOLUTION/ DROPS OPHTHALMIC at 05:36

## 2021-07-29 RX ADMIN — Medication 5: at 12:46

## 2021-07-29 RX ADMIN — Medication 1 GRAM(S): at 05:35

## 2021-07-29 RX ADMIN — CEFEPIME 100 MILLIGRAM(S): 1 INJECTION, POWDER, FOR SOLUTION INTRAMUSCULAR; INTRAVENOUS at 17:57

## 2021-07-29 RX ADMIN — Medication 2: at 17:55

## 2021-07-29 RX ADMIN — Medication 166.67 MILLIGRAM(S): at 17:54

## 2021-07-29 RX ADMIN — LATANOPROST 1 DROP(S): 0.05 SOLUTION/ DROPS OPHTHALMIC; TOPICAL at 22:58

## 2021-07-29 RX ADMIN — Medication 1 GRAM(S): at 12:02

## 2021-07-29 RX ADMIN — INSULIN GLARGINE 10 UNIT(S): 100 INJECTION, SOLUTION SUBCUTANEOUS at 22:34

## 2021-07-29 RX ADMIN — IRON SUCROSE 110 MILLIGRAM(S): 20 INJECTION, SOLUTION INTRAVENOUS at 17:56

## 2021-07-29 RX ADMIN — Medication 1 GRAM(S): at 00:00

## 2021-07-29 RX ADMIN — SODIUM CHLORIDE 1000 MILLILITER(S): 9 INJECTION INTRAMUSCULAR; INTRAVENOUS; SUBCUTANEOUS at 22:48

## 2021-07-29 RX ADMIN — LEVETIRACETAM 400 MILLIGRAM(S): 250 TABLET, FILM COATED ORAL at 20:45

## 2021-07-29 RX ADMIN — Medication 750 MILLIGRAM(S): at 18:29

## 2021-07-29 RX ADMIN — Medication 75 MILLIGRAM(S): at 12:02

## 2021-07-29 RX ADMIN — PANTOPRAZOLE SODIUM 40 MILLIGRAM(S): 20 TABLET, DELAYED RELEASE ORAL at 05:39

## 2021-07-29 RX ADMIN — MIDODRINE HYDROCHLORIDE 10 MILLIGRAM(S): 2.5 TABLET ORAL at 05:36

## 2021-07-29 RX ADMIN — TRANEXAMIC ACID 220 MILLIGRAM(S): 100 INJECTION, SOLUTION INTRAVENOUS at 18:36

## 2021-07-29 RX ADMIN — Medication 2: at 09:03

## 2021-07-29 RX ADMIN — Medication 1 MILLIGRAM(S): at 17:59

## 2021-07-29 RX ADMIN — BRIMONIDINE TARTRATE 1 DROP(S): 2 SOLUTION/ DROPS OPHTHALMIC at 19:06

## 2021-07-29 RX ADMIN — Medication 260 MILLIGRAM(S): at 05:35

## 2021-07-29 RX ADMIN — SODIUM CHLORIDE 65 MILLILITER(S): 9 INJECTION INTRAMUSCULAR; INTRAVENOUS; SUBCUTANEOUS at 18:06

## 2021-07-29 RX ADMIN — MIDODRINE HYDROCHLORIDE 10 MILLIGRAM(S): 2.5 TABLET ORAL at 12:01

## 2021-07-29 RX ADMIN — Medication 650 MILLIGRAM(S): at 06:05

## 2021-07-29 NOTE — CONSULT NOTE ADULT - ASSESSMENT
69 yo F Christian, hx of breast Ca on active chemo (unsure of name) and anemia p/w reported vaginal bleeding. Recent admission to [facility] for the same, underwent EPO treatment and discharged. Reported Hgb of 5 during admission. Feeling generally weak, denies chest pain sob. Reports bright red blood in the toilet when she urinates. Denies dysuria, abdominal pain. Patient is unable to receive blood products of any kind.  has been tachycardic.

## 2021-07-29 NOTE — PROGRESS NOTE ADULT - ASSESSMENT
71 yo Female Jehovah witness with PMHx of breast CA s/p BL mastectomy with recurrence and metastasis here with c/o vaginal bleeding, found to have bladder hematoma on pelvic ultrasound. Pt also with R sided nephU with mild hydro, but pt unsure indication for nephU.   on admission: H/H 4.3/13.5, SCr 1.37.  Pt follows up at [facility] and is currently receiving chemotherapy.     7/28: Hct 11.2 from 13.5 yesterday. ptn is pale, lethargic, dyspneic, expresses wishes not to have any blood products based on her GD DOV. ptn placed her proxy on the phone Mr. Gray who wanted to confirm the hospital is respecting ptn's wishes. both ptn and the proxy aksed baout being transferred to [facility]. however ptn is not stable w HGB of 3 to be travelling. denies CP, palps, HA, has ongoing hematuria, CBI in place. Ct C/A/P reviewed. ptn w metastatic dz process on CT scan. Heme consult called. will cont CBI as per . pending CT urogram. cont trending HH. started on IV IRON. prognosis is guarded.   7/29: ptn found unresponsive with a droop, fever, ongoing hematuria, had filled out a MOLST form , she is DNR/DNI, code stroke called, will give Abx, keep temp down w cooling blanket, since ptn cannot receive anticoagulation/is dnr/dni will not get CTA of brain, will place on KEPPRA for sz, get eeg, prognosis is grave. this was d/w ,Mr Gray ptn's proxy. MICU consult reviewed. prognosis is grave

## 2021-07-29 NOTE — STROKE CODE NOTE - NSSTROKETPADOOR_CONCOMITANT
Management of concomitant emergent/acute conditions such as cardiopulmonary arrest, intubation, respiratory arrest

## 2021-07-29 NOTE — PROGRESS NOTE ADULT - SUBJECTIVE AND OBJECTIVE BOX
Subjective  Denies pain, offers no complaints. CBI clear yellow on very slow gtt CBI --> clamped around 1pm.    Objective    Vital signs  T(F): , Max: 99.1 (07-28-21 @ 15:53)  HR: 103 (07-29-21 @ 12:33)  BP: 124/64 (07-29-21 @ 12:33)  SpO2: 98% (07-29-21 @ 12:33)  Wt(kg): --    Output         Gen: NAD  Abd: soft, nontender, nondistended  : diaz secured in place, draining clear yellow (CBI clamped 7/29@1pm)    Labs      07-28 @ 07:28    WBC --    / Hct --    / SCr 1.44     07-28 @ 07:27    WBC 3.08  / Hct 11.2  / SCr --           Culture - Urine (collected 07-28-21 @ 01:19)  Source: Catheterized Catheterized  Preliminary Report (07-29-21 @ 01:37):    >100,000 CFU/ml Gram positive organisms    < from: CT Abdomen and Pelvis w/wo IV Cont (07.27.21 @ 21:54) >    EXAM:  CT ABDOMEN AND PELVIS WAW IC                            PROCEDURE DATE:  07/27/2021            INTERPRETATION:  CLINICAL INFORMATION: Anemia with complaint of vaginal bleeding, though found with bladder hematoma on pelvic ultrasound.. History of breast cancer status post bilateral mastectomy with recurrence now on chemotherapy. Right nephroureteral stent.    COMPARISON: Ultrasound pelvis 7/27/2021    CONTRAST/COMPLICATIONS:  IV Contrast: Omnipaque 350  90 cc administered   10 cc discarded  Oral Contrast: None  Complications: None    PROCEDURE:  CT of the Abdomen and Pelvis was performed.  Precontrast, Nephrographic and Excretory phases were acquired (CT UROGRAM).  10 mg of Lasix was administered.  Sagittal and coronal reformats were performed.    FINDINGS:  LOWER CHEST: Small right-sided pleural effusion with associated atelectasis. There is mild thickening and hyperenhancement of the visualized right pleura. Bilateral mastectomy. Partially imaged right breast implant.    LIVER: Normal morphology and shape. Hepatic and portal veins patent. Numerous subcentimeter hypodensities that are too small to characterize.  BILE DUCTS: Normal caliber.  GALLBLADDER: Cholecystectomy.  SPLEEN: Within normal limits.  PANCREAS: Within normal limits.  ADRENALS: Within normal limits.  KIDNEYS/URETERS: Atrophic left kidney. Right percutaneous nephroureteral catheter, with the proximal locking loop located in a lower pole calyx, and the distal locking loop located within the urinary bladder. Mild right hydronephrosis. There is a cuff of amorphous soft tissue enveloping the distal right ureter (series 10 image 71). There are right renal subcentimeter hypodensities too small to characterize. Asymmetrically diminished contrast excretion fromthe left kidney. No left hydronephrosis.    BLADDER: Foci of air seen within the urinary bladder, nonspecific in the presence of a percutaneous nephroureteral stent. Filling defects within the urinary bladder without definite sites of enhancement, likely reflecting known blood products/clot.  REPRODUCTIVE ORGANS: Uterus within normal limits. No suspicious adnexal lesions.    BOWEL: No bowel obstruction. Appendix is normal. Small bowel anastomosis in the left hemiabdomen. Fecalization of the smallbowel.  PERITONEUM: No ascites.  VESSELS: Atherosclerotic changes.  RETROPERITONEUM/LYMPH NODES: No lymphadenopathy.  ABDOMINAL WALL: Anasarca. Right percutaneous nephroureteral stent.  BONES: Degenerative changes. Diffuse osseous demineralization. Mild compression fracture deformities in the T8 and T10 vertebra. No significant osseous retropulsion. Multifocal sites of rounded sclerosis in the imaged spine, with example in the L4 vertebral body seen on series 22 image 56 and in the L5 vertebral body seen on series 22 image 54. Irregular sclerotic changes within the T8 and T10 vertebral bodies may be related to degenerative changes, though metastatic disease is not excluded.    IMPRESSION:  1.  Nonenhancing debris within the urinary bladder, likely reflecting blood clot, as seen on the prior ultrasound exam.  2.  Amorphous enhancing soft tissue surrounding the distal right ureter, which is of uncertain etiology but potentially reflecting neoplasm. Correlation with prior imaging would be helpful.  3.  Scattered sclerotic lesions within the visualized spine, indeterminate but suspicious for osseous metastatic disease. Age-indeterminate mild compression fracture deformities of T8 and T10 with associated sclerotic changes potentially due to degenerative changes, though metastatic disease is not excluded.  4.  Right nephroureteral catheter with mild residual right hydronephrosis.  5.  Atrophy of the left kidney with asymmetricly decreased contrast excretion, likely reflecting decreased renal function. No left hydronephrosis.  6.  Numerous subcentimeter hypoattenuating liver lesions which are indeterminate. Metastatic disease is not excluded. Consider further assessment with nonemergent abdominal MRI if not already performed.  7.  Small right pleural effusion with mildly thickened and enhancing pleura, which may be seen in the setting of infection or neoplastic involvement.  8.  Fecalization of the small bowel, which may be seen in the setting of stasis/small bowel bacterial overgrowth. No evidence of obstruction. Moderate colonic stool burden, likely reflecting constipation.    --- End of Report ---              CAIO GREENE MD; Resident Radiologist  This document has been electronically signed.  GIDEON YEPEZ MD; Attending Radiologist  This document has been electronically signed. Jul 28 2021 12:18PM    < end of copied text >

## 2021-07-29 NOTE — CONSULT NOTE ADULT - SUBJECTIVE AND OBJECTIVE BOX
CHIEF COMPLAINT: hypotension    HPI:   71 yo F Presybeterian, hx of breast Ca on active chemo (unsure of name) and anemia p/w reported vaginal bleeding. Recent admission to [facility] for the same, underwent EPO treatment and discharged. Reported Hgb of 5 during admission. Feeling generally weak, denies chest pain sob. Reports bright red blood in the toilet when she urinates. Denies dysuria, abdominal pain. Patient is unable to receive blood products of any kind. (2021 21:01)    During admission pt received EPO , hypotension managed with midodrine 10 mg TID, IVF, and IV iron. Noted to have vaginal bleeding so CBI initiated . Pt reportedly febrile to 102 F  (not uploaded to flowsheets) with HR 120s, a/w R sided facial droop. CT Head  negative for acute hemorrhage.     Of note, primary team spoke with pt's family regarding GOC. Per chart review pt signed DNR/DNI at [facility]. primary team got DNR/DNI form faxed- per form pt made herself DNR/DNI.    PAST MEDICAL & SURGICAL HISTORY:  Anemia    No significant past surgical history    FAMILY HISTORY:  no significant    SOCIAL HISTORY:  no smoking    Allergies  No Known Allergies    Intolerances    Home Medications:  brimonidine 0.2% ophthalmic solution: 1 drop(s) in each eye 2 times a day (2021 23:34)  HumaLOG 100 units/mL subcutaneous solution: 4 unit(s) subcutaneous 3 times a day (before meals) as per blood sugar (2021 23:34)  Janumet 50 mg-1000 mg oral tablet: 0.5 tab(s) orally 2 times a day (2021 23:34)  Lumigan 0.01% ophthalmic solution: 1 drop(s) in each eye once a day (at bedtime) (2021 23:34)  midodrine 10 mg oral tablet: 1 tab(s) orally 3 times a day (2021 23:34)  MiraLax oral powder for reconstitution: 17 gram(s) orally once a day, As Needed (2021 23:34)  mirtazapine 15 mg oral tablet: 1 tab(s) orally once a day (at bedtime) (2021 23:34)  ondansetron 4 mg oral tablet: 1 tab(s) orally every 8 hours, As Needed (2021 23:34)  pantoprazole 40 mg oral delayed release tablet: 1 tab(s) orally 2 times a day (2021 23:34)  Refresh ophthalmic solution: 1 drop(s) in each eye 4 times a day, As Needed (2021 23:34)  sucralfate 1 g oral tablet: 1 tab(s) orally 3 times a day (2021 23:34)  Tylenol 325 mg oral tablet: 2 tab(s) orally every 6 hours, As Needed (2021 23:34)  venlafaxine 75 mg oral capsule, extended release: 3 cap(s) orally once a day (2021 23:34)    REVIEW OF SYSTEMS:  Unable to obtain as pt not responsive to commands    OBJECTIVE:  ICU Vital Signs Last 24 Hrs  T(C): 36.6 (2021 12:33), Max: 36.8 (2021 18:33)  T(F): 97.9 (2021 12:33), Max: 98.2 (2021 18:33)  HR: 103 (2021 12:33) (93 - 104)  BP: 124/64 (2021 12:33) (119/64 - 157/73)  BP(mean): --  ABP: --  ABP(mean): --  RR: 19 (2021 12:33) (19 - 20)  SpO2: 98% (2021 12:33) (97% - 99%)     @ : @ 07:00  --------------------------------------------------------  IN: 04088 mL / OUT: 12824 mL / NET: -96831 mL     @ 07: @ 17:24  --------------------------------------------------------  IN: 2680 mL / OUT: 4300 mL / NET: -1620 mL    CAPILLARY BLOOD GLUCOSE  POCT Blood Glucose.: 264 mg/dL (2021 15:28)    PHYSICAL EXAM:  CONSTITUTIONAL: sitting up in bed, NAD  HEAD: No evidence of trauma. Structures WNL.  EYES: + b/l horizontal nystagmus  ENT: No erythema. No pharyngeal exudates.   NECK: Supple. Appropriate ROM. No stiffness. No masses or lymphadenopathy.  RESPIRATORY: CTAB. No wheezes, rales, or rhonchi. No accessory muscle use. No apparent respiratory distress.  CARDIOVASCULAR: +tachycardic  GASTROINTESTINAL: Soft, nontender, nondistended. +BS. No rebound or guarding.   EXTREMITY: No LE swelling or edema. EXTs warm to touch.  MUSCULOSKELETAL: Nonresponsive to commands  DERMATOLOGICAL: No abnormal rashes or lesions.  NEUROLOGICAL: A&O x0, not following commands  PSYCHIATRIC: Does not follow commands    HOSPITAL MEDICATIONS:  MEDICATIONS  (STANDING):  acetaminophen  IVPB .. 750 milliGRAM(s) IV Intermittent once  brimonidine 0.2% Ophthalmic Solution 1 Drop(s) Both EYES two times a day  cefepime   IVPB      cefepime   IVPB 1000 milliGRAM(s) IV Intermittent once  dextrose 40% Gel 15 Gram(s) Oral once  dextrose 5%. 1000 milliLiter(s) (50 mL/Hr) IV Continuous <Continuous>  dextrose 5%. 1000 milliLiter(s) (100 mL/Hr) IV Continuous <Continuous>  dextrose 50% Injectable 25 Gram(s) IV Push once  dextrose 50% Injectable 12.5 Gram(s) IV Push once  dextrose 50% Injectable 25 Gram(s) IV Push once  glucagon  Injectable 1 milliGRAM(s) IntraMuscular once  insulin glargine Injectable (LANTUS) 10 Unit(s) SubCutaneous at bedtime  insulin lispro (ADMELOG) corrective regimen sliding scale   SubCutaneous three times a day before meals  insulin lispro (ADMELOG) corrective regimen sliding scale   SubCutaneous at bedtime  iron sucrose IVPB 200 milliGRAM(s) IV Intermittent every 24 hours  latanoprost 0.005% Ophthalmic Solution 1 Drop(s) Both EYES at bedtime  levETIRAcetam  IVPB 1000 milliGRAM(s) IV Intermittent once  levETIRAcetam  IVPB 500 milliGRAM(s) IV Intermittent every 12 hours  LORazepam   Injectable 1 milliGRAM(s) IV Push once  midodrine. 10 milliGRAM(s) Oral three times a day  mirtazapine 15 milliGRAM(s) Oral at bedtime  pantoprazole    Tablet 40 milliGRAM(s) Oral before breakfast  sodium chloride 0.9% Bolus 1000 milliLiter(s) IV Bolus once  sodium chloride 0.9%. 1000 milliLiter(s) (65 mL/Hr) IV Continuous <Continuous>  sucralfate 1 Gram(s) Oral four times a day  vancomycin  IVPB 1250 milliGRAM(s) IV Intermittent once  venlafaxine XR. 75 milliGRAM(s) Oral daily    MEDICATIONS  (PRN):  acetaminophen   Tablet .. 650 milliGRAM(s) Oral every 6 hours PRN Temp greater or equal to 38C (100.4F), Mild Pain (1 - 3)  artificial  tears Solution 1 Drop(s) Both EYES four times a day PRN Dry Eyes  oxyCODONE    IR 5 milliGRAM(s) Oral every 4 hours PRN Moderate Pain (4 - 6)  polyethylene glycol 3350 17 Gram(s) Oral daily PRN Constipation    LABS:                        4.1    6.88  )-----------( 203      ( 2021 15:56 )             13.6         135  |  100  |  35<H>  ----------------------------<  233<H>  5.4<H>   |  14<L>  |  2.11<H>    Ca    9.9      2021 16:14    TPro  8.0  /  Alb  3.5  /  TBili  0.3  /  DBili  x   /  AST  32  /  ALT  20  /  AlkPhos  178<H>      PT/INR - ( 2021 16:14 )   PT: 13.6 sec;   INR: 1.14 ratio       PTT - ( 2021 16:14 )  PTT:30.2 sec  Urinalysis Basic - ( 2021 21:49 )    Color: Red / Appearance: Turbid / S.018 / pH: x  Gluc: x / Ketone: Negative  / Bili: Negative / Urobili: Negative   Blood: x / Protein: 300 mg/dL / Nitrite: Negative   Leuk Esterase: Moderate / RBC: 3591 /hpf / WBC 20 /HPF   Sq Epi: x / Non Sq Epi: 3 /hpf / Bacteria: Negative    Venous Blood Gas:   @ 16:46  7.32/36/24/18/23  VBG Lactate: 7.4  Venous Blood Gas:   @ 20:37  7.36/45/20//  VBG Lactate: 2.2

## 2021-07-29 NOTE — PROGRESS NOTE ADULT - ASSESSMENT
71 yo Female Jehovah witness with PMHx of breast CA s/p BL mastectomy with recurrence and metastasis here with c/o vaginal bleeding, found to have bladder hematoma on pelvic ultrasound. Pt also with R sided nephU with mild hydro, but pt unsure indication for nephU. H/H 4.3/13.5, SCr 1.37. Pt follows up at [facility] and is currently receiving chemotherapy.   CT urogram reviewed, demonstrates soft tissue enhancement at distal R ureter (possible metastatic disease?), mild right hydro with R nephroU in place, L kidney atrophy, debris within bladder consistent with clot.    Urine culture: gram positive organisms    Recs:  - CBI clamped 7/29/21@1pm --> monitor urine color, restart CBI if hematuria recurs and contact urology, manually irrigate clots prn (contact urology if unable to manually irrigate clots adequately)  - FU urine culture, recommend broad spectrum ABX, narrow per sensitivities  - continue with R NT capped as is  - please obtain records from [facility] and contact urology once obtained   69 yo Female Jehovah witness with PMHx of breast CA s/p BL mastectomy with recurrence and metastasis here with c/o vaginal bleeding, found to have bladder hematoma on pelvic ultrasound. Pt also with R sided nephU with mild hydro, but pt unsure indication for nephU. H/H 4.3/13.5, SCr 1.37. Pt follows up at [facility] and is currently receiving chemotherapy.   CT urogram reviewed, demonstrates soft tissue enhancement at distal R ureter (possible metastatic disease?), mild right hydro with R nephroU in place, L kidney atrophy, debris within bladder consistent with clot.    Urine culture: gram positive organisms    Recs:  - CBI clamped 7/29/21@1pm --> monitor urine color, restart CBI if hematuria recurs and contact urology, manually irrigate clots prn (contact urology if unable to manually irrigate clots adequately)  - FU urine culture  - continue with R NT capped as is  - consider medical management of anemia, epo? and iron  - please obtain records from [facility] and contact urology once obtained

## 2021-07-29 NOTE — STROKE CODE NOTE - DISPOSITION
remain in unit, DNR/DNI no further interventions per primary attending; discussed with Dr. Thomas in comunication w attending/Other remain in unit, DNR/DNI no further interventions per primary attending; discussed with Dr. Thomas in comunication w attending Dr. Libman/Other

## 2021-07-29 NOTE — STROKE CODE NOTE - NIH STROKE SCALE: 1A. LEVEL OF CONSCIOUSNESS, QM
(2) Not alert; requires repeated stimulation to attend, or is obtunded and requires strong or painful stimulation to make movements (not stereotyped) (3) Responds only with reflex motor or autonomic effects or totally unresponsive, flaccid, and areflexic

## 2021-07-29 NOTE — CHART NOTE - NSCHARTNOTEFT_GEN_A_CORE
RRT called by RN for hypotension and AMS. Called HCP Mr. Gray regarding GOC. Confirms that pt is a Jehovah witness but states he is not sure about code status. SPoke top Sheldon Kay who states that pt had signed a DNR form recently at [place].  Sheldon is trying to fax over a copy of the DNR. RRT team and stroke team notified. Attending notified.

## 2021-07-29 NOTE — RAPID RESPONSE TEAM SUMMARY - NSSITUATIONBACKGROUNDRRT_GEN_ALL_CORE
71 yo Female Jehovah witness with PMHx of breast CA s/p BL mastectomy with recurrence and metastasis on chemo, a/w hematuria, found to have bladder hematoma on pelvic ultrasound, Hgb 3.4 on admission (Jehova's Witness, declining blood transfusions).    RRT called for hypotension and AMS.

## 2021-07-29 NOTE — CONSULT NOTE ADULT - ASSESSMENT
71 yo F Jain, hx of breast Ca on active chemo (unsure of name) and anemia p/w reported vaginal bleeding. MICU consult for hypotension likely 2/2 ongoing hemorrhage:    #Hypotension:   - c/w EPO per heme  - c/w IV iron per neph  - consider tranexamic acid given known source of bleed (will increase risk of clot given underlying malignancy but benefit outweighs risk at this time given active bleed with limited alternate interventions)  - limit IVF to avoid diluting Hgb further  - limit blood draws as much as possible  - cool down patient to 36 degrees C   - can use tylenol for fevers  - follow up cultures- reasonable to treat with broad spectrum Abx- adjust based on sensitivities/specificities  - can use ativan prn, keppra for sz ppx- per neuro recs    Pt is not a MICU candidate at this time given her DNR/DNI status

## 2021-07-29 NOTE — RAPID RESPONSE TEAM SUMMARY - NSMEDICATIONSRRT_GEN_ALL_CORE
Patient seen and examined at bedside.    VITAL SIGNS:  BP 95/66, , RR 25, SpO2 96% RA    FOCUSED PHYSICAL EXAM:  GENERAL: The patient is AAOx0, responsive only to painful stimuli  HEENT: NC/AT. EOMI. Pupils reactive but sluggish b/l  CV: tachycardic  NEUROLOGIC: downward/rightward gaze deviation, ? left facial droop    07-28 @ 07:01  -  07-29 @ 07:00  --------------------------------------------------------  IN: 20413 mL / OUT: 18330 mL / NET: -14047 mL    07-29 @ 07:01 - 07-29 @ 16:47  --------------------------------------------------------  IN: 2680 mL / OUT: 4300 mL / NET: -1620 mL    Labs reviewed in EMR    EKG: NSR, diffuse ST depressions      ASSESMENT/PLAN:  71 yo Female Jehovah witness with PMHx of breast CA s/p BL mastectomy with recurrence and metastasis on chemo, a/w hematuria, found to have bladder hematoma on pelvic ultrasound, Hgb 3.4 on admission (Jehova's Witness, declining blood transfusions). RRT called for hypotension and AMS.               Plan discussed with primary team.    Carlos Wong MD  PGY3, Internal Medicine Resident  [id number]  Patient seen and examined at bedside.    VITAL SIGNS:  BP 95/66, , RR 25, SpO2 96% RA    FOCUSED PHYSICAL EXAM:  GENERAL: The patient is AAOx0, responsive only to painful stimuli  HEENT: NC/AT. EOMI. Pupils reactive but sluggish b/l  CV: tachycardic  NEUROLOGIC: downward/rightward gaze deviation, ? left facial droop    Labs reviewed in EMR    EKG: NSR, diffuse ST depressions      ASSESMENT/PLAN:  69 yo Female Jehovah witness with PMHx of breast CA s/p BL mastectomy with recurrence and metastasis on chemo, a/w hematuria, found to have bladder hematoma on pelvic ultrasound, Hgb 3.4 on admission (Jehova's Witness, declining blood transfusions). RRT called for hypotension and AMS.                 Plan discussed with primary team.    Carlos Wong MD  PGY3, Internal Medicine Resident  [id number]  Patient seen and examined at bedside.    VITAL SIGNS:  BP 95/66, , RR 25, SpO2 96% RA, T 102.5    FOCUSED PHYSICAL EXAM:  GENERAL: The patient is AAOx0, responsive only to painful stimuli  HEENT: NC/AT. EOMI. Pupils reactive but sluggish b/l  CV: tachycardic  NEUROLOGIC: downward/rightward gaze deviation, ? left facial droop    Labs reviewed in EMR    EKG: NSR, diffuse ST depressions    Interventions:   -1L NS bolus started with improvement in BP to 100/61  -Code stroke called, STAT CT head prelim negative for hemorrhage  -Labs sent, including CBC/CMP/VBG/blood cultures  -Vanc/cefepime started    ASSESMENT/PLAN:  71 yo Female Jehovah witness with PMHx of breast CA s/p BL mastectomy with recurrence and metastasis on chemo, a/w hematuria, found to have bladder hematoma on pelvic ultrasound, Hgb 3.4 on admission (Jehova's Witness, declining blood transfusions). RRT and code stroke called for hypotension and AMS, likely 2/2 acute blood loss anemia vs sepsis vs stroke.    -f/u CT head official read  -F/u neuro recs, ? need for MRI vs EEG  -F/u labs sent including CBC/CMP/VBG  -C/w broad spectrum abx, renally dosed  -f/u blood and urine cx, deescalate abx as appropriate   -Clarify code status with HCP          Plan discussed with primary team.    Carlos Wong MD  PGY3, Internal Medicine Resident  [id number]

## 2021-07-29 NOTE — CONSULT NOTE ADULT - SUBJECTIVE AND OBJECTIVE BOX
CHIEF COMPLAINT:    HISTORY OF PRESENT ILLNESS:  71 yo F Mormon, hx of breast Ca on active chemo (unsure of name) and anemia p/w reported vaginal bleeding. Recent admission to [facility] for the same, underwent EPO treatment and discharged. Reported Hgb of 5 during admission. Feeling generally weak, denies chest pain sob. Reports bright red blood in the toilet when she urinates. Denies dysuria, abdominal pain. Patient is unable to receive blood products of any kind.  has been tachycardic.       PAST MEDICAL & SURGICAL HISTORY:  Anemia    No significant past surgical history            MEDICATIONS:  midodrine. 10 milliGRAM(s) Oral three times a day        acetaminophen   Tablet .. 650 milliGRAM(s) Oral every 6 hours PRN  mirtazapine 15 milliGRAM(s) Oral at bedtime  oxyCODONE    IR 5 milliGRAM(s) Oral every 4 hours PRN  venlafaxine XR. 75 milliGRAM(s) Oral daily    pantoprazole    Tablet 40 milliGRAM(s) Oral before breakfast  polyethylene glycol 3350 17 Gram(s) Oral daily PRN  sucralfate 1 Gram(s) Oral four times a day    dextrose 40% Gel 15 Gram(s) Oral once  dextrose 50% Injectable 25 Gram(s) IV Push once  dextrose 50% Injectable 12.5 Gram(s) IV Push once  dextrose 50% Injectable 25 Gram(s) IV Push once  glucagon  Injectable 1 milliGRAM(s) IntraMuscular once  insulin glargine Injectable (LANTUS) 10 Unit(s) SubCutaneous at bedtime  insulin lispro (ADMELOG) corrective regimen sliding scale   SubCutaneous three times a day before meals  insulin lispro (ADMELOG) corrective regimen sliding scale   SubCutaneous at bedtime    artificial  tears Solution 1 Drop(s) Both EYES four times a day PRN  brimonidine 0.2% Ophthalmic Solution 1 Drop(s) Both EYES two times a day  dextrose 5%. 1000 milliLiter(s) IV Continuous <Continuous>  dextrose 5%. 1000 milliLiter(s) IV Continuous <Continuous>  iron sucrose IVPB 200 milliGRAM(s) IV Intermittent every 24 hours  latanoprost 0.005% Ophthalmic Solution 1 Drop(s) Both EYES at bedtime      FAMILY HISTORY:      SOCIAL HISTORY:    [ ] Non-smoker  [ ] Smoker  [ ] Alcohol    Allergies    No Known Allergies    Intolerances    	    REVIEW OF SYSTEMS:  CONSTITUTIONAL: No fever, weight loss, or fatigue  EYES: No eye pain, visual disturbances, or discharge  ENMT:  No difficulty hearing, tinnitus, vertigo; No sinus or throat pain  NECK: No pain or stiffness  RESPIRATORY: No cough, wheezing, chills or hemoptysis; No Shortness of Breath  CARDIOVASCULAR: No chest pain, palpitations, passing out, dizziness, or leg swelling  GASTROINTESTINAL: No abdominal or epigastric pain. No nausea, vomiting, or hematemesis; No diarrhea or constipation. No melena or hematochezia.  GENITOURINARY: No dysuria, frequency, hematuria, or incontinence  NEUROLOGICAL: No headaches, memory loss, loss of strength, numbness, or tremors  SKIN: No itching, burning, rashes, or lesions   LYMPH Nodes: No enlarged glands  ENDOCRINE: No heat or cold intolerance; No hair loss  MUSCULOSKELETAL: No joint pain or swelling; No muscle, back, or extremity pain  PSYCHIATRIC: No depression, anxiety, mood swings, or difficulty sleeping  HEME/LYMPH: No easy bruising, or bleeding gums  ALLERY AND IMMUNOLOGIC: No hives or eczema	    [ ] All others negative	  [ ] Unable to obtain    PHYSICAL EXAM:  T(C): 36.6 (07-29-21 @ 04:53), Max: 37.3 (07-28-21 @ 15:53)  HR: 104 (07-29-21 @ 04:53) (93 - 104)  BP: 119/64 (07-29-21 @ 04:53) (119/64 - 157/73)  RR: 20 (07-29-21 @ 04:53) (19 - 20)  SpO2: 98% (07-29-21 @ 04:53) (97% - 100%)  Wt(kg): --  I&O's Summary    28 Jul 2021 07:01  -  29 Jul 2021 07:00  --------------------------------------------------------  IN: 58681 mL / OUT: 84292 mL / NET: -33512 mL    29 Jul 2021 07:01  -  29 Jul 2021 11:31  --------------------------------------------------------  IN: 2440 mL / OUT: 2100 mL / NET: 340 mL        Appearance: Normal	  HEENT:   Normal oral mucosa, PERRL, EOMI	  Lymphatic: No lymphadenopathy  Cardiovascular: Normal S1 S2, No JVD, No murmurs, No edema  Respiratory: Lungs clear to auscultation	  Psychiatry: A & O x 3, Mood & affect appropriate  Gastrointestinal:  Soft, Non-tender, + BS	  Skin: No rashes, No ecchymoses, No cyanosis	  Neurologic: Non-focal  Extremities: Normal range of motion, No clubbing, cyanosis or edema  Vascular: Peripheral pulses palpable 2+ bilaterally    TELEMETRY: SR 	    ECG:  	Sinus tach, RBBB, no acute ischemic stt changes   RADIOLOGY:  < from: CT Chest No Cont (07.28.21 @ 17:45) >    EXAM:  CT CHEST                            PROCEDURE DATE:  07/28/2021            INTERPRETATION:  CT CHEST WITHOUT CONTRAST    INDICATION: History of breast cancer. Evaluate for metastatic disease.    TECHNIQUE: Unenhanced helical images were obtained of the chest. Coronal and sagittal images were reconstructed. Maximum intensity projection images were generated.    COMPARISON: CT abdomen and pelvis 7/27/2021.    FINDINGS:    Tubes/Lines: None.    Lungs, airways and pleura: Within the right upper lobe right middle lobe right lower lobe are juxtapleural opacities and reticular opacities.    A small right pleural effusion. The pleura along the right hemithorax best shown along the costophrenic sulcus is likely thickened.    The airways are normal.    Mediastinum: The visualized thyroid gland is unremarkable. There are calcified and noncalcified chest lymph nodes measure less than 10 mm in the short axis. The esophagus is unremarkable.    Upper Abdomen: Right partially imaged nephroureteral catheter. Contrast in the bilateral kidneys. The left kidney is partially imaged and is atrophic. Cholecystectomy.    Bones And Soft Tissues: Degenerative change of the thoracic spine. There are subtle lucent lesions within the thoracic spine best shown at T2 and T11. Addition, there are subtle lucencies along the posterior table of the manubrium. There are subtle lucent lesions within the ribs, best shown at along the left eighth rib. Right breast implantation.      IMPRESSION:    1.  Small right pleural effusion with likely underlying pleural thickening.  2.  Right upper middle lobe juxtapleural linear opacities are likely secondary to prior radiation treatment.  3.  Lucent lesions within the spine and ribs and manubrium are concerning for metastasis.    --- End of Report ---                YOSVANY SAUER MD; Attending Radiologist  This document has been electronically signed. Jul 29 2021 10:14AM    < end of copied text >    OTHER: 	  	  LABS:	 	    CARDIAC MARKERS:                                  3.4    3.08  )-----------( 122      ( 28 Jul 2021 07:27 )             11.2     07-28    130<L>  |  96  |  30<H>  ----------------------------<  163<H>  4.1   |  22  |  1.44<H>    Ca    9.4      28 Jul 2021 07:28    TPro  7.9  /  Alb  3.6  /  TBili  0.1<L>  /  DBili  x   /  AST  35  /  ALT  19  /  AlkPhos  164<H>  07-27    proBNP:   Lipid Profile:   HgA1c:   TSH:

## 2021-07-29 NOTE — PROGRESS NOTE ADULT - SUBJECTIVE AND OBJECTIVE BOX
Overnight events noted      VITAL:  T(C): , Max: 37.3 (21 @ 15:53)  T(F): , Max: 99.1 (21 @ 15:53)  HR: 104 (21 @ 04:53)  BP: 119/64 (21 @ 04:53)  BP(mean): --  RR: 20 (21 @ 04:53)  SpO2: 98% (21 @ 04:53)      PHYSICAL EXAM:  Constitutional: NAD, Alert  HEENT: NCAT, MMM  Neck: Supple, No JVD  Respiratory: CTA-b/l  Cardiovascular: RRR s1s2, no m/r/g  Gastrointestinal: BS+, soft, NT/ND  : diaz-cbi  Extremities: No peripheral edema b/l  Neurological: no focal deficits; strength grossly intact  Back: no CVAT b/l  Skin: No rashes, no nevi    LABS:                        3.4    3.08  )-----------( 122      ( 2021 07:27 )             11.2     Na(130)/K(4.1)/Cl(96)/HCO3(22)/BUN(30)/Cr(1.44)Glu(163)/Ca(9.4)/Mg(--)/PO4(--)     @ 07:28  Na(133)/K(5.3)/Cl(99)/HCO3(19)/BUN(29)/Cr(1.37)Glu(171)/Ca(9.8)/Mg(--)/PO4(--)     @ 15:39    Urinalysis Basic - ( 2021 21:49 )  Color: Red / Appearance: Turbid / S.018 / pH: x  Gluc: x / Ketone: Negative  / Bili: Negative / Urobili: Negative   Blood: x / Protein: 300 mg/dL / Nitrite: Negative   Leuk Esterase: Moderate / RBC: 3591 /hpf / WBC 20 /HPF   Sq Epi: x / Non Sq Epi: 3 /hpf / Bacteria: Negative      IMPRESSION: 70F, [employer], with breast CA, 21 p/w vaginal bleeding/severe anemia    (1)Renal - mild azotemia - likely prerenal, in association with her anemia  (2)Lytes - acceptable  (3)Proteinuria - is this artifactual in association with the gross hematuria?  (4)Anemia - severe - iron deficiency - on IV Venofer - cannot accept PRBCs as she is a Qu[employer]    RECOMMEND:  (1)Venofer 200mg iv qd as ordered  (2)Check iron studies  (3)Urine: protein, creatinine  (4)Dose new meds for GFR 40-50ml/min  (5)Minimize blood draws          David Nixon MD  [facility]  Office: [phone]  Cell: [phone]       no pain, no sob  (+)fatigue  on CBI      VITAL:  T(C): , Max: 37.3 (21 @ 15:53)  T(F): , Max: 99.1 (21 @ 15:53)  HR: 104 (21 @ 04:53)  BP: 119/64 (21 @ 04:53)  BP(mean): --  RR: 20 (21 @ 04:53)  SpO2: 98% (21 @ 04:53)      PHYSICAL EXAM:  Constitutional: NAD, Alert  HEENT: NCAT, MMM  Neck: Supple, No JVD  Respiratory: CTA-b/l  Cardiovascular: RRR s1s2, no m/r/g  Gastrointestinal: BS+, soft, NT/ND  : diaz-cbi - pink-tinged urine  Extremities: No peripheral edema b/l  Neurological: no focal deficits; strength grossly intact  Back: no CVAT b/l  Skin: No rashes, no nevi    LABS:                        3.4    3.08  )-----------( 122      ( 2021 07:27 )             11.2     Na(130)/K(4.1)/Cl(96)/HCO3(22)/BUN(30)/Cr(1.44)Glu(163)/Ca(9.4)/Mg(--)/PO4(--)     @ 07:28  Na(133)/K(5.3)/Cl(99)/HCO3(19)/BUN(29)/Cr(1.37)Glu(171)/Ca(9.8)/Mg(--)/PO4(--)     @ 15:39    Urinalysis Basic - ( 2021 21:49 )  Color: Red / Appearance: Turbid / S.018 / pH: x  Gluc: x / Ketone: Negative  / Bili: Negative / Urobili: Negative   Blood: x / Protein: 300 mg/dL / Nitrite: Negative   Leuk Esterase: Moderate / RBC: 3591 /hpf / WBC 20 /HPF   Sq Epi: x / Non Sq Epi: 3 /hpf / Bacteria: Negative      IMPRESSION: 70F, [employer], with breast CA, 21 p/w vaginal bleeding/severe anemia    (1)Renal - mild azotemia - likely prerenal, in association with her anemia  (2)Lytes - acceptable  (3)Proteinuria - is this artifactual in association with the gross hematuria?  (4)Anemia - severe - iron deficiency - on IV Venofer - cannot accept PRBCs as she is a Muslim    RECOMMEND:  (1)Venofer 200mg iv qd as ordered  (2)Check iron studies  (3)Urine: protein, creatinine  (4)Dose new meds for GFR 40-50ml/min  (5)Minimize blood draws          David Nixon MD  [facility]  Office: [phone]  Cell: [phone]

## 2021-07-29 NOTE — CHART NOTE - NSCHARTNOTEFT_GEN_A_CORE
71yo L-handed Female PMHx breast cancer w diffuse mets to ribs, lungs, whole spine c/b urinary/bowel incontinence, on palliative chemotherapy, p/w hematuria Hg 3-4 w bladder hematoma on w/u not accepting blood products with RRT called for nonresponsiveness, R gaze preference with hypotension SBP 80-90s, -130s, 102.5F. LKN 12:00 with unclear symptom unset, RRT called by nurse preceding code stroke. CTH negative for acute hemorrhage, labs pertinent for GLEN     Plan  DNR/DNI  no further interventions per primary team    incomplete noite 69yo L-handed Female PMHx breast cancer w diffuse mets to ribs, lungs, whole spine c/b urinary/bowel incontinence, on palliative chemotherapy, p/w hematuria Hg 3-4 w bladder hematoma on w/u not accepting blood products with RRT called for nonresponsiveness, R gaze preference with grimace/withdrawl to painful stimuli anti-gravity, intermittent moaning without localizing side.  LKN 12:00 with unclear symptom onset, RRT called by nurse on routine checks 15:30 preceding code stroke. Pt was hypotension SBP 80-90s, -130s, 102.5F. CTH negative for acute hemorrhage.     Discussion CTA head/neck for basilar occlusion r/o versus suspicion for seizure simultaneous with primary team GOC conversation / MICU consult. Patient slightly more arousable with improved SBP low 100s, received 1mg Ativan pushed without break of R gaze. Myoclonus at ankles appreciated bL.  Decision made by primary attending in conjunction with DNR/DNI status, MICU/Neurology teams at bedside that no further workup / imaging would be pursued.     Plan:  - DNR/DNI  - no further imaging will be pursued per primary team/attending s/p multi-speciality and GOC discussion      primary team and family aware unable to r/o basilar AIS at this time   - keppra 500 IV BID with ativan 1mg IV PRN rescue (up to 4mg per protocol, but would be concerned for airway protection in s/o poor mentation currently)  - neuro checks per floor protocol, seizure precautions, dysphagia screen    Please consult Neurology if any further concerns or questions.  Patient discussed with stroke fellow Dr. Thomas who is in direct communication with Dr. Libman for final recommendations regarding consultant management 71yo L-handed Female PMHx breast cancer w diffuse mets to ribs, lungs, whole spine c/b urinary/bowel incontinence, on palliative chemotherapy, p/w hematuria Hg 3-4 w bladder hematoma on w/u not accepting blood products with RRT called for nonresponsiveness, R gaze preference with grimace/withdrawl to painful stimuli anti-gravity, intermittent moaning without localizing side.  LKN 12:00 with unclear symptom onset, RRT called by nurse on routine checks 15:30 preceding code stroke. Pt was hypotension SBP 80-90s, -130s, 102.5F. CTH negative for acute hemorrhage.     Discussion CTA head/neck for basilar occlusion r/o versus suspicion for seizure simultaneous with primary team GOC conversation / MICU consult. Patient slightly more arousable with improved SBP low 100s, received 1mg Ativan pushed without break of R gaze. Myoclonus at ankles appreciated bL.  Decision made by primary attending in conjunction with DNR/DNI status, MICU/Neurology teams at bedside that no further workup / imaging would be pursued.     Plan:  - DNR/DNI  - no further imaging will be pursued per primary team/attending s/p multi-speciality and GOC discussion      primary team and family aware unable to r/o basilar AIS at this time   - keppra 500 IV BID with ativan 1mg IV PRN rescue (up to 4mg per protocol, but would be concerned for airway protection in s/o poor mentation currently)  - neuro checks per floor protocol, seizure precautions, dysphagia screen    Please consult Neurology if any further concerns or questions.  Patient discussed with stroke fellow Dr. Thomas who is in direct communication with Dr. Libman for final recommendations regarding consultant management    Stroke attending. Agree with above

## 2021-07-29 NOTE — PROGRESS NOTE ADULT - SUBJECTIVE AND OBJECTIVE BOX
Patient is a 70y old  Female who presents with a chief complaint of severe anemia, acute blood loss 2/2 hematuria (2021 17:22)      SUBJECTIVE / OVERNIGHT EVENTS: ptn found unresponsive with a droop, fever, ongoing hematuria, had filled out a MOLST form , she is DNR/DNI, code stroke called, will give Abx, keep temp down w cooling blanket, since ptn cannot receive anticoagulation/is dnr/dni will not get CTA of brain, will place on KEPPRA for sz, get eeg, prognosis is grave. this was d/w ,Mr Gray ptn's proxy    MEDICATIONS  (STANDING):  brimonidine 0.2% Ophthalmic Solution 1 Drop(s) Both EYES two times a day  cefepime   IVPB      dextrose 40% Gel 15 Gram(s) Oral once  dextrose 5%. 1000 milliLiter(s) (50 mL/Hr) IV Continuous <Continuous>  dextrose 5%. 1000 milliLiter(s) (100 mL/Hr) IV Continuous <Continuous>  dextrose 50% Injectable 25 Gram(s) IV Push once  dextrose 50% Injectable 12.5 Gram(s) IV Push once  dextrose 50% Injectable 25 Gram(s) IV Push once  glucagon  Injectable 1 milliGRAM(s) IntraMuscular once  insulin glargine Injectable (LANTUS) 10 Unit(s) SubCutaneous at bedtime  insulin lispro (ADMELOG) corrective regimen sliding scale   SubCutaneous three times a day before meals  insulin lispro (ADMELOG) corrective regimen sliding scale   SubCutaneous at bedtime  iron sucrose IVPB 200 milliGRAM(s) IV Intermittent every 24 hours  latanoprost 0.005% Ophthalmic Solution 1 Drop(s) Both EYES at bedtime  levETIRAcetam  IVPB 1000 milliGRAM(s) IV Intermittent once  levETIRAcetam  IVPB 500 milliGRAM(s) IV Intermittent every 12 hours  midodrine. 10 milliGRAM(s) Oral three times a day  mirtazapine 15 milliGRAM(s) Oral at bedtime  pantoprazole    Tablet 40 milliGRAM(s) Oral before breakfast  sodium chloride 0.9%. 1000 milliLiter(s) (65 mL/Hr) IV Continuous <Continuous>  sucralfate 1 Gram(s) Oral four times a day  venlafaxine XR. 75 milliGRAM(s) Oral daily    MEDICATIONS  (PRN):  acetaminophen   Tablet .. 650 milliGRAM(s) Oral every 6 hours PRN Temp greater or equal to 38C (100.4F), Mild Pain (1 - 3)  artificial  tears Solution 1 Drop(s) Both EYES four times a day PRN Dry Eyes  oxyCODONE    IR 5 milliGRAM(s) Oral every 4 hours PRN Moderate Pain (4 - 6)  polyethylene glycol 3350 17 Gram(s) Oral daily PRN Constipation      Vital Signs Last 24 Hrs  T(F): 97.9 (21 @ 12:33), Max: 98.2 (21 @ 20:00)  HR: 103 (21 @ 12:33) (93 - 104)  BP: 124/64 (21 @ 12:33) (119/64 - 157/73)  RR: 19 (21 @ 12:33) (19 - 20)  SpO2: 98% (21 @ 12:33) (98% - 99%)  Telemetry:   CAPILLARY BLOOD GLUCOSE      POCT Blood Glucose.: 209 mg/dL (2021 17:29)  POCT Blood Glucose.: 264 mg/dL (2021 15:28)  POCT Blood Glucose.: 353 mg/dL (2021 12:40)  POCT Blood Glucose.: 205 mg/dL (2021 08:46)  POCT Blood Glucose.: 220 mg/dL (2021 21:55)    I&O's Summary    2021 07:  -  2021 07:00  --------------------------------------------------------  IN: 26252 mL / OUT: 81477 mL / NET: -84262 mL    2021 07:  -  2021 19:38  --------------------------------------------------------  IN: 2680 mL / OUT: 4300 mL / NET: -1620 mL        PHYSICAL EXAM:  GENERAL: NAD, well-developed  HEAD:  Atraumatic, Normocephalic  EYES: EOMI, PERRLA, conjunctiva and sclera clear  NECK: Supple, No JVD  CHEST/LUNG: Clear to auscultation bilaterally; No wheeze  HEART: Regular rate and rhythm; No murmurs, rubs, or gallops  ABDOMEN: Soft, Nontender, Nondistended; Bowel sounds present  EXTREMITIES:  2+ Peripheral Pulses, No clubbing, cyanosis, or edema  PSYCH: AAOx3  NEUROLOGY: non-focal  SKIN: No rashes or lesions    LABS:                        4.1    6.88  )-----------( 203      ( 2021 15:56 )             13.6         135  |  100  |  35<H>  ----------------------------<  233<H>  5.4<H>   |  14<L>  |  2.11<H>    Ca    9.9      2021 16:14    TPro  8.0  /  Alb  3.5  /  TBili  0.3  /  DBili  x   /  AST  32  /  ALT  20  /  AlkPhos  178<H>      PT/INR - ( 2021 16:14 )   PT: 13.6 sec;   INR: 1.14 ratio         PTT - ( 2021 16:14 )  PTT:30.2 sec      Urinalysis Basic - ( 2021 21:49 )    Color: Red / Appearance: Turbid / S.018 / pH: x  Gluc: x / Ketone: Negative  / Bili: Negative / Urobili: Negative   Blood: x / Protein: 300 mg/dL / Nitrite: Negative   Leuk Esterase: Moderate / RBC: 3591 /hpf / WBC 20 /HPF   Sq Epi: x / Non Sq Epi: 3 /hpf / Bacteria: Negative        RADIOLOGY & ADDITIONAL TESTS:    Imaging Personally Reviewed:    Consultant(s) Notes Reviewed:      Care Discussed with Consultants/Other Providers:

## 2021-07-29 NOTE — PATIENT PROFILE ADULT - INTERNATIONAL TRAVEL
"S: Patient is currently comfortable at this time.  The patient is somewhat concerned that her epidural may be \"too strong\".  She asked that the epidural be \"turned down.\"    O:   Vitals:    17 1703 17 1708 17 1714 17 1718   BP: 107/60 93/65 109/70 125/79   BP Location:       Patient Position:       Pulse: 81 81 90 86   Resp:    18   Temp:    98.5 °F (36.9 °C)   TempSrc:    Oral   SpO2:       Weight:       Height:           Abdomen: Soft, nontender.  Estimated fetal weight 9 pounds by Leopold  Cervix: 5/70/-2; molding of the fetal head and It noted.  Previous IUPC was removed and a new IUPC was placed.  Having concerned that possibly her IUPC was not working properly.    Most recent ultrasound for estimated fetal weight was performed on 17 showed an estimated fetal weight of 6 lbs. 13 oz.    NST: 120s/reactive/moderate bili/no decelerations  Tocometry: Contractions every 1-3 minutes; however, the amplitude of these contractions only about 25 mmHg. I have concerns that possibly the intrauterine pressure catheters not reading appropriately.    Assessment:  1.  22-year-old  1 at 41 and one sevenths weeks gestational age being induced for postdates pregnancy  2.  Fetal status reassuring  3.  Suspected cephalopelvic disproportion  4.  GBS negative    Plan:  1.  Patient is making very minimal cervical change.  She was 3 cm dilated and 70% effaced at noon today.  Now she is only 5 cm dilated and 70% effaced.  Also, the baby is still at a very high station of -2.  This is higher than would be expected for a primiparous patient at 41 weeks of gestation.  I have some concerns about the possibility of cephalopelvic disproportion.  By Leopold maneuvers today feel the baby weighs about 9 pounds.  Her ultrasound over 5 weeks ago had an estimated fetal weight of 6 lbs. 13 oz.  There is also some molding noted on exam already.  I explained these findings to the patient.  I did approach the notion " of a  section with the patient and her partner.  I explained that I am happy to allow the patient to continue with a trial labor at this point, since she has made some minimal change and since the fetal status is reassuring.  I did explain my overall concerned about the potential prospects for discuss full vaginal delivery given the findings of potential cephalopelvic disproportion with high station at 41 weeks of gestation.  The patient verbalized understanding.  She is highly motivated to have a vaginal delivery at this point.  We will continue with a trial of labor.  I explained to the patient that if she has not made any more cervical change at the time of the next exam, we would need to strongly consider  section.  She verbalized understanding.    2.  I replaced the intrauterine pressure catheter to try to see if we can get a better reading of her contractions.  Her Pitocin is currently at 12 mU/m.  Her contractions are occurring every 1-3 minutes.  We will see if we get any better readings with a new catheter.  3.  The patient would like to decrease her epidural.  I explained to the patient that her inability to completely remove her lower extremities is typical with a labor epidural.  I explained that if we decreased the strength of her epidural, she may begin to have more painful contractions.  She verbalized understanding of this but would like to try to decrease the epidural strength at this time.  If this is what the patient wishes to do, this seems to be reasonable.   No

## 2021-07-30 LAB
-  AMPICILLIN: SIGNIFICANT CHANGE UP
-  CIPROFLOXACIN: SIGNIFICANT CHANGE UP
-  LEVOFLOXACIN: SIGNIFICANT CHANGE UP
-  NITROFURANTOIN: SIGNIFICANT CHANGE UP
-  TETRACYCLINE: SIGNIFICANT CHANGE UP
-  VANCOMYCIN: SIGNIFICANT CHANGE UP
ANION GAP SERPL CALC-SCNC: 13 MMOL/L — SIGNIFICANT CHANGE UP (ref 5–17)
BUN SERPL-MCNC: 42 MG/DL — HIGH (ref 7–23)
CALCIUM SERPL-MCNC: 8.8 MG/DL — SIGNIFICANT CHANGE UP (ref 8.4–10.5)
CHLORIDE SERPL-SCNC: 104 MMOL/L — SIGNIFICANT CHANGE UP (ref 96–108)
CO2 SERPL-SCNC: 16 MMOL/L — LOW (ref 22–31)
CREAT SERPL-MCNC: 2.25 MG/DL — HIGH (ref 0.5–1.3)
CULTURE RESULTS: SIGNIFICANT CHANGE UP
E FAECALIS DNA BLD POS QL NAA+NON-PROBE: SIGNIFICANT CHANGE UP
GLUCOSE BLDC GLUCOMTR-MCNC: 206 MG/DL — HIGH (ref 70–99)
GLUCOSE BLDC GLUCOMTR-MCNC: 209 MG/DL — HIGH (ref 70–99)
GLUCOSE BLDC GLUCOMTR-MCNC: 210 MG/DL — HIGH (ref 70–99)
GLUCOSE BLDC GLUCOMTR-MCNC: 216 MG/DL — HIGH (ref 70–99)
GLUCOSE SERPL-MCNC: 205 MG/DL — HIGH (ref 70–99)
GRAM STN FLD: SIGNIFICANT CHANGE UP
METHOD TYPE: SIGNIFICANT CHANGE UP
METHOD TYPE: SIGNIFICANT CHANGE UP
ORGANISM # SPEC MICROSCOPIC CNT: SIGNIFICANT CHANGE UP
ORGANISM # SPEC MICROSCOPIC CNT: SIGNIFICANT CHANGE UP
POTASSIUM SERPL-MCNC: 3.9 MMOL/L — SIGNIFICANT CHANGE UP (ref 3.5–5.3)
POTASSIUM SERPL-SCNC: 3.9 MMOL/L — SIGNIFICANT CHANGE UP (ref 3.5–5.3)
SODIUM SERPL-SCNC: 133 MMOL/L — LOW (ref 135–145)
SPECIMEN SOURCE: SIGNIFICANT CHANGE UP
SPECIMEN SOURCE: SIGNIFICANT CHANGE UP

## 2021-07-30 PROCEDURE — 99232 SBSQ HOSP IP/OBS MODERATE 35: CPT | Mod: GC

## 2021-07-30 PROCEDURE — 99223 1ST HOSP IP/OBS HIGH 75: CPT

## 2021-07-30 PROCEDURE — 71045 X-RAY EXAM CHEST 1 VIEW: CPT | Mod: 26

## 2021-07-30 RX ORDER — SODIUM BICARBONATE 1 MEQ/ML
0.12 SYRINGE (ML) INTRAVENOUS
Qty: 75 | Refills: 0 | Status: DISCONTINUED | OUTPATIENT
Start: 2021-07-30 | End: 2021-07-31

## 2021-07-30 RX ORDER — AMPICILLIN TRIHYDRATE 250 MG
CAPSULE ORAL
Refills: 0 | Status: DISCONTINUED | OUTPATIENT
Start: 2021-07-30 | End: 2021-08-03

## 2021-07-30 RX ORDER — FOLIC ACID 0.8 MG
1 TABLET ORAL DAILY
Refills: 0 | Status: DISCONTINUED | OUTPATIENT
Start: 2021-07-30 | End: 2021-08-13

## 2021-07-30 RX ORDER — FUROSEMIDE 40 MG
40 TABLET ORAL EVERY 12 HOURS
Refills: 0 | Status: DISCONTINUED | OUTPATIENT
Start: 2021-07-30 | End: 2021-07-31

## 2021-07-30 RX ORDER — PREGABALIN 225 MG/1
1000 CAPSULE ORAL DAILY
Refills: 0 | Status: COMPLETED | OUTPATIENT
Start: 2021-07-30 | End: 2021-08-03

## 2021-07-30 RX ORDER — MIDODRINE HYDROCHLORIDE 2.5 MG/1
10 TABLET ORAL THREE TIMES A DAY
Refills: 0 | Status: DISCONTINUED | OUTPATIENT
Start: 2021-07-30 | End: 2021-08-02

## 2021-07-30 RX ORDER — AMPICILLIN TRIHYDRATE 250 MG
2 CAPSULE ORAL EVERY 12 HOURS
Refills: 0 | Status: DISCONTINUED | OUTPATIENT
Start: 2021-07-31 | End: 2021-08-03

## 2021-07-30 RX ORDER — MIRTAZAPINE 45 MG/1
15 TABLET, ORALLY DISINTEGRATING ORAL AT BEDTIME
Refills: 0 | Status: DISCONTINUED | OUTPATIENT
Start: 2021-07-30 | End: 2021-08-13

## 2021-07-30 RX ORDER — VENLAFAXINE HCL 75 MG
37.5 CAPSULE, EXT RELEASE 24 HR ORAL EVERY 12 HOURS
Refills: 0 | Status: DISCONTINUED | OUTPATIENT
Start: 2021-07-30 | End: 2021-08-13

## 2021-07-30 RX ORDER — INSULIN LISPRO 100/ML
VIAL (ML) SUBCUTANEOUS EVERY 6 HOURS
Refills: 0 | Status: DISCONTINUED | OUTPATIENT
Start: 2021-07-30 | End: 2021-08-03

## 2021-07-30 RX ORDER — ACETAMINOPHEN 500 MG
750 TABLET ORAL ONCE
Refills: 0 | Status: COMPLETED | OUTPATIENT
Start: 2021-07-30 | End: 2021-07-30

## 2021-07-30 RX ORDER — PANTOPRAZOLE SODIUM 20 MG/1
40 TABLET, DELAYED RELEASE ORAL DAILY
Refills: 0 | Status: DISCONTINUED | OUTPATIENT
Start: 2021-07-30 | End: 2021-08-13

## 2021-07-30 RX ORDER — CEFEPIME 1 G/1
1000 INJECTION, POWDER, FOR SOLUTION INTRAMUSCULAR; INTRAVENOUS DAILY
Refills: 0 | Status: DISCONTINUED | OUTPATIENT
Start: 2021-07-30 | End: 2021-07-30

## 2021-07-30 RX ORDER — VANCOMYCIN HCL 1 G
1000 VIAL (EA) INTRAVENOUS ONCE
Refills: 0 | Status: COMPLETED | OUTPATIENT
Start: 2021-07-30 | End: 2021-07-30

## 2021-07-30 RX ORDER — SODIUM ZIRCONIUM CYCLOSILICATE 10 G/10G
10 POWDER, FOR SUSPENSION ORAL DAILY
Refills: 0 | Status: DISCONTINUED | OUTPATIENT
Start: 2021-07-30 | End: 2021-07-31

## 2021-07-30 RX ORDER — AMPICILLIN TRIHYDRATE 250 MG
2 CAPSULE ORAL ONCE
Refills: 0 | Status: COMPLETED | OUTPATIENT
Start: 2021-07-30 | End: 2021-07-30

## 2021-07-30 RX ADMIN — Medication 40 MILLIGRAM(S): at 17:07

## 2021-07-30 RX ADMIN — IRON SUCROSE 110 MILLIGRAM(S): 20 INJECTION, SOLUTION INTRAVENOUS at 17:07

## 2021-07-30 RX ADMIN — Medication 2: at 12:47

## 2021-07-30 RX ADMIN — LEVETIRACETAM 400 MILLIGRAM(S): 250 TABLET, FILM COATED ORAL at 22:09

## 2021-07-30 RX ADMIN — Medication 2: at 22:04

## 2021-07-30 RX ADMIN — Medication 2: at 09:33

## 2021-07-30 RX ADMIN — BRIMONIDINE TARTRATE 1 DROP(S): 2 SOLUTION/ DROPS OPHTHALMIC at 05:26

## 2021-07-30 RX ADMIN — Medication 250 MILLIGRAM(S): at 12:35

## 2021-07-30 RX ADMIN — Medication 300 MILLIGRAM(S): at 06:28

## 2021-07-30 RX ADMIN — Medication 216 GRAM(S): at 18:34

## 2021-07-30 RX ADMIN — Medication 750 MILLIGRAM(S): at 06:58

## 2021-07-30 RX ADMIN — Medication 1 MILLIGRAM(S): at 12:34

## 2021-07-30 RX ADMIN — INSULIN GLARGINE 10 UNIT(S): 100 INJECTION, SOLUTION SUBCUTANEOUS at 22:03

## 2021-07-30 RX ADMIN — Medication 2: at 17:37

## 2021-07-30 RX ADMIN — MIRTAZAPINE 15 MILLIGRAM(S): 45 TABLET, ORALLY DISINTEGRATING ORAL at 22:10

## 2021-07-30 RX ADMIN — Medication 75 MEQ/KG/HR: at 18:34

## 2021-07-30 RX ADMIN — SODIUM ZIRCONIUM CYCLOSILICATE 10 GRAM(S): 10 POWDER, FOR SUSPENSION ORAL at 18:34

## 2021-07-30 RX ADMIN — LEVETIRACETAM 400 MILLIGRAM(S): 250 TABLET, FILM COATED ORAL at 09:03

## 2021-07-30 RX ADMIN — PREGABALIN 1000 MICROGRAM(S): 225 CAPSULE ORAL at 12:34

## 2021-07-30 RX ADMIN — MIDODRINE HYDROCHLORIDE 10 MILLIGRAM(S): 2.5 TABLET ORAL at 11:00

## 2021-07-30 RX ADMIN — PANTOPRAZOLE SODIUM 40 MILLIGRAM(S): 20 TABLET, DELAYED RELEASE ORAL at 12:48

## 2021-07-30 RX ADMIN — LATANOPROST 1 DROP(S): 0.05 SOLUTION/ DROPS OPHTHALMIC; TOPICAL at 22:10

## 2021-07-30 RX ADMIN — BRIMONIDINE TARTRATE 1 DROP(S): 2 SOLUTION/ DROPS OPHTHALMIC at 16:53

## 2021-07-30 RX ADMIN — Medication 37.5 MILLIGRAM(S): at 18:34

## 2021-07-30 RX ADMIN — MIDODRINE HYDROCHLORIDE 10 MILLIGRAM(S): 2.5 TABLET ORAL at 17:07

## 2021-07-30 RX ADMIN — SODIUM CHLORIDE 65 MILLILITER(S): 9 INJECTION INTRAMUSCULAR; INTRAVENOUS; SUBCUTANEOUS at 10:16

## 2021-07-30 RX ADMIN — CEFEPIME 100 MILLIGRAM(S): 1 INJECTION, POWDER, FOR SOLUTION INTRAMUSCULAR; INTRAVENOUS at 05:25

## 2021-07-30 RX ADMIN — Medication 75 MEQ/KG/HR: at 22:09

## 2021-07-30 RX ADMIN — Medication 75 MEQ/KG/HR: at 13:51

## 2021-07-30 NOTE — PROGRESS NOTE ADULT - SUBJECTIVE AND OBJECTIVE BOX
Subjective: Patient seen and examined. No new events except as noted.   s/p RRT called for hypotension and AMS   +blood cultures  REVIEW OF SYSTEMS:    CONSTITUTIONAL: + weakness, fevers or chills  EYES/ENT: No visual changes;  No vertigo or throat pain   NECK: No pain or stiffness  RESPIRATORY: No cough, wheezing, hemoptysis; No shortness of breath  CARDIOVASCULAR: No chest pain or palpitations  GASTROINTESTINAL: No abdominal or epigastric pain. No nausea, vomiting, or hematemesis; No diarrhea or constipation. No melena or hematochezia.  GENITOURINARY: No dysuria, frequency or hematuria  NEUROLOGICAL: No numbness or weakness  SKIN: No itching, burning, rashes, or lesions   All other review of systems is negative unless indicated above.    MEDICATIONS:  MEDICATIONS  (STANDING):  brimonidine 0.2% Ophthalmic Solution 1 Drop(s) Both EYES two times a day  cefepime   IVPB      cefepime   IVPB 1000 milliGRAM(s) IV Intermittent every 12 hours  dextrose 40% Gel 15 Gram(s) Oral once  dextrose 5%. 1000 milliLiter(s) (50 mL/Hr) IV Continuous <Continuous>  dextrose 5%. 1000 milliLiter(s) (100 mL/Hr) IV Continuous <Continuous>  dextrose 50% Injectable 25 Gram(s) IV Push once  dextrose 50% Injectable 12.5 Gram(s) IV Push once  dextrose 50% Injectable 25 Gram(s) IV Push once  glucagon  Injectable 1 milliGRAM(s) IntraMuscular once  insulin glargine Injectable (LANTUS) 10 Unit(s) SubCutaneous at bedtime  insulin lispro (ADMELOG) corrective regimen sliding scale   SubCutaneous three times a day before meals  insulin lispro (ADMELOG) corrective regimen sliding scale   SubCutaneous at bedtime  iron sucrose IVPB 200 milliGRAM(s) IV Intermittent every 24 hours  latanoprost 0.005% Ophthalmic Solution 1 Drop(s) Both EYES at bedtime  levETIRAcetam  IVPB 500 milliGRAM(s) IV Intermittent every 12 hours  midodrine. 10 milliGRAM(s) Oral three times a day  mirtazapine 15 milliGRAM(s) Oral at bedtime  pantoprazole    Tablet 40 milliGRAM(s) Oral before breakfast  sodium chloride 0.9%. 1000 milliLiter(s) (65 mL/Hr) IV Continuous <Continuous>  sucralfate 1 Gram(s) Oral four times a day  vancomycin  IVPB 1000 milliGRAM(s) IV Intermittent once  venlafaxine XR. 75 milliGRAM(s) Oral daily      PHYSICAL EXAM:  T(C): 36.6 (07-30-21 @ 07:30), Max: 38.4 (07-30-21 @ 04:58)  HR: 100 (07-30-21 @ 07:30) (100 - 116)  BP: 118/50 (07-30-21 @ 07:30) (83/40 - 129/54)  RR: 18 (07-30-21 @ 07:30) (18 - 19)  SpO2: 96% (07-30-21 @ 07:30) (96% - 98%)  Wt(kg): --  I&O's Summary    29 Jul 2021 07:01  -  30 Jul 2021 07:00  --------------------------------------------------------  IN: 34785 mL / OUT: 70564 mL / NET: -54626 mL    30 Jul 2021 07:01  -  30 Jul 2021 10:01  --------------------------------------------------------  IN: 2000 mL / OUT: 2700 mL / NET: -700 mL          Appearance: NAD	  HEENT:   Dry  oral mucosa, PERRL, EOMI	  Lymphatic: No lymphadenopathy , no edema  Cardiovascular: Normal S1 S2, No JVD, No murmurs , Peripheral pulses palpable 2+ bilaterally  Respiratory: Decreased bs 	  Gastrointestinal:  Soft, Non-tender, + BS	  Skin: No rashes, No ecchymoses, No cyanosis, warm to touch  Musculoskeletal: Normal range of motion, normal strength  Psychiatry:  lethargic   Ext: No edema      LABS:    CARDIAC MARKERS:      cCulture - Blood (07.29.21 @ 18:57)   Gram Stain:   Growth in aerobic bottle: Gram Positive Cocci in Pairs and Chains   Growth in anaerobic bottle: Gram Positive Cocci in Pairs and Chains   - Enterococcus faecalis: Detec   Specimen Source: .Blood Blood-Peripheral   Organism: Blood Culture PCR   Culture Results:   Growth in aerobic bottle: Gram Positive Cocci in Pairs and Chains   Growth in anaerobic bottle: Gram Positive Cocci in Pairs and ChainsCulture - Urine (07.28.21 @ 01:19)   - Ampicillin: S <=2 Predicts results to ampicillin/sulbactam, amoxacillin-clavulanate and piperacillin-tazobactam.   - Ciprofloxacin: S <=1   - Levofloxacin: S <=1   - Nitrofurantoin: S <=32 Should not be used to treat pyelonephritis.   - Tetra/Doxy: R >8   - Vancomycin: S 2   Specimen Source: Catheterized Catheterized   Culture Results:   >100,000 CFU/ml Enterococcus faecalis   Organism Identification: Enterococcus faecalis                           4.1    6.88  )-----------( 203      ( 29 Jul 2021 15:56 )             13.6     07-29    135  |  100  |  35<H>  ----------------------------<  233<H>  5.4<H>   |  14<L>  |  2.11<H>    Ca    9.9      29 Jul 2021 16:14    TPro  8.0  /  Alb  3.5  /  TBili  0.3  /  DBili  x   /  AST  32  /  ALT  20  /  AlkPhos  178<H>  07-29    proBNP:   Lipid Profile:   HgA1c:   TSH: Thyroid Stimulating Hormone, Serum: 2.45 uIU/mL (07-29 @ 19:56)          TELEMETRY: 	SR  Sinus tach   ECG:  	  RADIOLOGY:   DIAGNOSTIC TESTING:  [ ] Echocardiogram:  [ ]  Catheterization:  [ ] Stress Test:    OTHER:

## 2021-07-30 NOTE — PROGRESS NOTE ADULT - SUBJECTIVE AND OBJECTIVE BOX
Overnight events noted      VITAL:  T(C): , Max: 38.4 (07-30-21 @ 04:58)  T(F): , Max: 101.2 (07-30-21 @ 04:58)  HR: 100 (07-30-21 @ 07:30)  BP: 118/50 (07-30-21 @ 07:30)  BP(mean): --  RR: 18 (07-30-21 @ 07:30)  SpO2: 96% (07-30-21 @ 07:30)      PHYSICAL EXAM:  Constitutional: NAD, Alert  HEENT: NCAT, MMM  Neck: Supple, No JVD  Respiratory: CTA-b/l  Cardiovascular: RRR s1s2, no m/r/g  Gastrointestinal: BS+, soft, NT/ND  : diaz-cbi - pink-tinged urine  Extremities: No peripheral edema b/l  Neurological: no focal deficits; strength grossly intact  Back: no CVAT b/l  Skin: No rashes, no nevi    LABS:                        4.1    6.88  )-----------( 203      ( 29 Jul 2021 15:56 )             13.6     Na(135)/K(5.4)/Cl(100)/HCO3(14)/BUN(35)/Cr(2.11)Glu(233)/Ca(9.9)/Mg(--)/PO4(--)    07-29 @ 16:14  Na(131)/K(4.7)/Cl(97)/HCO3(12)/BUN(33)/Cr(1.94)Glu(272)/Ca(9.7)/Mg(--)/PO4(--)    07-29 @ 14:52  Na(130)/K(4.1)/Cl(96)/HCO3(22)/BUN(30)/Cr(1.44)Glu(163)/Ca(9.4)/Mg(--)/PO4(--)    07-28 @ 07:28  Na(133)/K(5.3)/Cl(99)/HCO3(19)/BUN(29)/Cr(1.37)Glu(171)/Ca(9.8)/Mg(--)/PO4(--)    07-27 @ 15:39      IMPRESSION: 70F, Gnosticist, with breast CA, 7/27/21 p/w vaginal bleeding/severe anemia    (1)Renal - GLEN - numbers significantly worse as of yesterday- likely ischemic ATN, in association with her anemia/sepsis    (2)Hyperkalemia - renally mediated - no labs of yet for today. Despite the fact that we are trying to limit bloodwork, she needs bloodwork today to ensure her electrolytes have not worsened further    (3)Metabolic acidosis - renally mediated    (4)ID - strep bacteremia - now on empiric broad-spectrum IV antibiotics    RECOMMEND:  (1)BMP at least daily while renal parameters worsening/while electrolytes worsening/abnormal  (2)Change IVF to 1/2NS+75meq/L NaHCO3; 75cc/h  (3)Lasix 40mg iv q12h for kaliuretic effect  (4)Renal diet, if taking PO  (5)Lokelma 10gm po qd, if taking PO meds  (6)Venofer 200mg iv qd as ordered  (7)Abx for GFR<15 - reduce cefepime to 1gm iv q24h            David Nixon MD  [facility]  Office: [phone]  Cell: [phone]       minimally communicative      VITAL:  T(C): , Max: 38.4 (07-30-21 @ 04:58)  T(F): , Max: 101.2 (07-30-21 @ 04:58)  HR: 100 (07-30-21 @ 07:30)  BP: 118/50 (07-30-21 @ 07:30)  BP(mean): --  RR: 18 (07-30-21 @ 07:30)  SpO2: 96% (07-30-21 @ 07:30)      PHYSICAL EXAM:  Constitutional: obtunded  HEENT: NCAT, DMM  Neck: Supple, No JVD  Respiratory: CTA-b/l  Cardiovascular: RRR s1s2, no m/r/g  Gastrointestinal: BS+, soft, NT/ND  : diaz-cbi - pink-tinged urine  Extremities: No peripheral edema b/l  Neurological: no focal deficits; strength grossly intact  Back: no CVAT b/l  Skin: No rashes, no nevi    LABS:                        4.1    6.88  )-----------( 203      ( 29 Jul 2021 15:56 )             13.6     Na(135)/K(5.4)/Cl(100)/HCO3(14)/BUN(35)/Cr(2.11)Glu(233)/Ca(9.9)/Mg(--)/PO4(--)    07-29 @ 16:14  Na(131)/K(4.7)/Cl(97)/HCO3(12)/BUN(33)/Cr(1.94)Glu(272)/Ca(9.7)/Mg(--)/PO4(--)    07-29 @ 14:52  Na(130)/K(4.1)/Cl(96)/HCO3(22)/BUN(30)/Cr(1.44)Glu(163)/Ca(9.4)/Mg(--)/PO4(--)    07-28 @ 07:28  Na(133)/K(5.3)/Cl(99)/HCO3(19)/BUN(29)/Cr(1.37)Glu(171)/Ca(9.8)/Mg(--)/PO4(--)    07-27 @ 15:39      IMPRESSION: 70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 Despite the fact that we are trying to limit bloodwork, she needs bloodwork today to ensure her electrolytes have not worsened further    (3)Metabolic acidosis - renally mediated    (4)ID - strep bacteremia - now on empiric broad-spectrum IV antibiotics    RECOMMEND:  (1)BMP at least daily while renal parameters worsening/while electrolytes worsening/abnormal  (2)Change IVF to 1/2NS+75meq/L NaHCO3; 75cc/h  (3)Lasix 40mg iv q12h for kaliuretic effect  (4)Renal diet, if taking PO  (5)Lokelma 10gm po qd, if taking PO meds  (6)Venofer 200mg iv qd as ordered  (7)Abx for GFR<15 - reduce cefepime to 1gm iv q24h  (8)I would not consider her a candidate for HD unless hemoglobin trends up to above 7; the bleeding risk associated with catheter placement/dialysis itself would be too high for it to be reasonable to plan for HD catheter placement.          David Nixon MD  [facility]  Office: [phone]  Cell: [phone]

## 2021-07-30 NOTE — CONSULT NOTE ADULT - SUBJECTIVE AND OBJECTIVE BOX
Patient is a 70y old  Female who presents with a chief complaint of severe anemia, acute blood loss 2/2 hematuria (2021 10:01)      HPI:  71 yo F Islam, hx of breast Ca on active chemo (unsure of name) and anemia p/w reported vaginal bleeding. Recent admission to [facility] for the same, underwent EPO treatment and discharged. Reported Hgb of 5 during admission. Feeling generally weak, denies chest pain sob. Reports bright red blood in the toilet when she urinates. Denies dysuria, abdominal pain. Patient is unable to receive blood products of any kind. (2021 21:01)    Pt admitted with bleeding, CT/ US with no intrauterine pathology, + bladder hematoma, hydronephrosis, metastatic disease. 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 seizure, no plan for further imaging, was given tranexamic acid. CBI now very light pink, to be clamped.    Pt has been intermittently opening eyes per staff. currently moans slightly to voice but does not open eyes or attempt to speak. Pt is DNR/DNI      PAST MEDICAL & SURGICAL HISTORY:  Anemia    Breast cancer, metastatic        SOCIAL HISTORY:  Smoking - Non smoker   Alcohol - Social  Drugs - No drug use  has HCP    FAMILY HISTORY:  M- , uterine ca  F-   sister- DM    daughter with disability per staff    MEDICATIONS  (STANDING):  brimonidine 0.2% Ophthalmic Solution 1 Drop(s) Both EYES two times a day  cefepime   IVPB      cefepime   IVPB 1000 milliGRAM(s) IV Intermittent every 12 hours  dextrose 40% Gel 15 Gram(s) Oral once  dextrose 5%. 1000 milliLiter(s) (50 mL/Hr) IV Continuous <Continuous>  dextrose 5%. 1000 milliLiter(s) (100 mL/Hr) IV Continuous <Continuous>  dextrose 50% Injectable 25 Gram(s) IV Push once  dextrose 50% Injectable 12.5 Gram(s) IV Push once  dextrose 50% Injectable 25 Gram(s) IV Push once  glucagon  Injectable 1 milliGRAM(s) IntraMuscular once  insulin glargine Injectable (LANTUS) 10 Unit(s) SubCutaneous at bedtime  insulin lispro (ADMELOG) corrective regimen sliding scale   SubCutaneous three times a day before meals  insulin lispro (ADMELOG) corrective regimen sliding scale   SubCutaneous at bedtime  iron sucrose IVPB 200 milliGRAM(s) IV Intermittent every 24 hours  latanoprost 0.005% Ophthalmic Solution 1 Drop(s) Both EYES at bedtime  levETIRAcetam  IVPB 500 milliGRAM(s) IV Intermittent every 12 hours  midodrine. 10 milliGRAM(s) Oral three times a day  mirtazapine 15 milliGRAM(s) Oral at bedtime  pantoprazole    Tablet 40 milliGRAM(s) Oral before breakfast  sodium chloride 0.9%. 1000 milliLiter(s) (65 mL/Hr) IV Continuous <Continuous>  sucralfate 1 Gram(s) Oral four times a day  vancomycin  IVPB 1000 milliGRAM(s) IV Intermittent once  venlafaxine XR. 75 milliGRAM(s) Oral daily    MEDICATIONS  (PRN):  acetaminophen   Tablet .. 650 milliGRAM(s) Oral every 6 hours PRN Temp greater or equal to 38C (100.4F), Mild Pain (1 - 3)  artificial  tears Solution 1 Drop(s) Both EYES four times a day PRN Dry Eyes  oxyCODONE    IR 5 milliGRAM(s) Oral every 4 hours PRN Moderate Pain (4 - 6)  polyethylene glycol 3350 17 Gram(s) Oral daily PRN Constipation      Allergies    No Known Allergies    Intolerances    ROS- not able to be obtained, pt nonverbal      Vital Signs Last 24 Hrs  T(C): 36.6 (2021 07:30), Max: 38.4 (2021 04:58)  T(F): 97.8 (2021 07:30), Max: 101.2 (2021 04:58)  HR: 100 (2021 07:30) (100 - 116)  BP: 118/50 (2021 07:30) (83/40 - 129/54)  BP(mean): --  RR: 18 (2021 07:30) (18 - 19)  SpO2: 96% (2021 07:30) (96% - 98%)    PHYSICAL EXAM  General: adult in NAD  HEENT: pink conjunctiva  Neck: NGT  CV: normal S1/S2   Lungs: clear to auscultation anterior  Abdomen: soft non-tender non-distended, positive bowel sounds  Ext: no clubbing cyanosis or edema  Skin: no rashes and no petechiae  Lymph Nodes: No LAD neck  Neuro: lethargic, not responding    LABS:                          4.1    6.88  )-----------( 203      ( 2021 15:56 )             13.6         Mean Cell Volume : 111.5 fl  Mean Cell Hemoglobin : 33.6 pg  Mean Cell Hemoglobin Concentration : 30.1 gm/dL  Auto Neutrophil # : x  Auto Lymphocyte # : x  Auto Monocyte # : x  Auto Eosinophil # : x  Auto Basophil # : x  Auto Neutrophil % : x  Auto Lymphocyte % : x  Auto Monocyte % : x  Auto Eosinophil % : x  Auto Basophil % : x      Serial CBC's   @ 15:56  Hct-13.6 / Hgb-4.1 / Plat-203 / RBC-1.22 / WBC-6.88  Serial CBC's   @ 07:27  Hct-11.2 / Hgb-3.4 / Plat-122 / RBC-1.04 / WBC-3.08  Serial CBC's   @ 15:39  Hct-13.5 / Hgb-4.3 / Plat-147 / RBC-1.30 / WBC-3.22          135  |  100  |  35<H>  ----------------------------<  233<H>  5.4<H>   |  14<L>  |  2.11<H>    Ca    9.9      2021 16:14    TPro  8.0  /  Alb  3.5  /  TBili  0.3  /  DBili  x   /  AST  32  /  ALT  20  /  AlkPhos  178<H>        PT/INR - ( 2021 16:14 )   PT: 13.6 sec;   INR: 1.14 ratio         PTT - ( 2021 16:14 )  PTT:30.2 sec    Ferritin, Serum: 492 ng/mL ( @ 09:02)                Radiology:    < from: US Transvaginal (21 @ 16:20) >  IMPRESSION:    Hematoma and layering debris within the bladder in the context of right-sided nephroureterostomy and mild hydronephrosis.    No intrauterine pathology.    < end of copied text >  < from: CT Abdomen and Pelvis w/wo IV Cont (21 @ 21:54) >  IMPRESSION:  1.  Nonenhancing debris within the urinary bladder, likely reflecting blood clot, as seen on the prior ultrasound exam.  2.  Amorphous enhancing soft tissue surrounding the distal right ureter, which is of uncertain etiology but potentially reflecting neoplasm. Correlation with prior imaging would be helpful.  3.  Scattered sclerotic lesions within the visualized spine, indeterminate but suspicious for osseous metastatic disease. Age-indeterminate mild compression fracture deformities of T8 and T10 with associated sclerotic changes potentially due to degenerative changes, though metastatic disease is not excluded.  4.  Right nephroureteral catheter with mild residual right hydronephrosis.  5.  Atrophy of the left kidney with asymmetricly decreased contrast excretion, likely reflecting decreased renal function. No left hydronephrosis.  6.  Numerous subcentimeter hypoattenuating liver lesions which are indeterminate. Metastatic disease is not excluded. Consider further assessment with nonemergent abdominal MRI if not already performed.  7.  Small right pleural effusion with mildly thickened and enhancing pleura, which may be seen in the setting of infection or neoplastic involvement.  8.  Fecalization of the small bowel, which may be seen in the setting of stasis/small bowel bacterial overgrowth. No evidence of obstruction. Moderate colonic stool burden, likely reflecting constipation.    < end of copied text >  < from: CT Head No Cont (21 @ 16:16) >  IMPRESSION:  No intracranial hemorrhage    Dr. Munoz discussed these findings with Dr. Rodgers on 2021 4:17 PM with read back.

## 2021-07-30 NOTE — PROGRESS NOTE ADULT - SUBJECTIVE AND OBJECTIVE BOX
Subjective  Code stroke called overnight, decision made for no further imaging/workup.  Urine draining clear yellow with CBI clamped since yesterday. Urine culture +enterococcus faecalis. Blood Cx +GPC.    Objective    Vital signs  T(F): , Max: 101.2 (07-30-21 @ 04:58)  HR: 116 (07-30-21 @ 04:58)  BP: 129/54 (07-30-21 @ 04:58)  SpO2: 96% (07-30-21 @ 04:58)  Wt(kg): --    Output         Gen: NAD, resting in bed, does not respond to questioning (relayed to RN - states this is stable from overnight and this AM)  Abd: soft, nontender, nondistended  : diaz secured in place, draining CYU (CBI clamped)    Labs      07-29 @ 16:14    WBC --    / Hct --    / SCr 2.11     07-29 @ 15:56    WBC 6.88  / Hct 13.6  / SCr --           Culture - Blood (collected 07-29-21 @ 18:57)  Source: .Blood Blood-Peripheral  Gram Stain (07-30-21 @ 06:06):    Growth in aerobic bottle: Gram Positive Cocci in Pairs and Chains    Growth in anaerobic bottle: Gram Positive Cocci in Pairs and Chains  Preliminary Report (07-30-21 @ 06:06):    Growth in aerobic bottle: Gram Positive Cocci in Pairs and Chains    Growth in anaerobic bottle: Gram Positive Cocci in Pairs and Chains    ***Blood Panel PCR results on this specimen are available    approximately 3 hours after the Gram stain result.***    Gram stain, PCR, and/or culture results may not always    correspond due to difference in methodologies.    ************************************************************    This PCR assay was performed by multiplex PCR. This    Assay tests for 66 bacterial and resistance gene targets.    Please refer to the [facility] test directory    at [email or web address] for details.  Organism: Blood Culture PCR (07-30-21 @ 08:35)  Organism: Blood Culture PCR (07-30-21 @ 08:35)      -  Enterococcus faecalis: Detec      Method Type: PCR    Culture - Urine (collected 07-28-21 @ 01:19)  Source: Catheterized Catheterized  Final Report (07-30-21 @ 04:22):    >100,000 CFU/ml Enterococcus faecalis  Organism: Enterococcus faecalis (07-30-21 @ 04:22)  Organism: Enterococcus faecalis (07-30-21 @ 04:22)      -  Ampicillin: S <=2 Predicts results to ampicillin/sulbactam, amoxacillin-clavulanate and  piperacillin-tazobactam.      -  Ciprofloxacin: S <=1      -  Levofloxacin: S <=1      -  Nitrofurantoin: S <=32 Should not be used to treat pyelonephritis.      -  Tetra/Doxy: R >8      -  Vancomycin: S 2      Method Type: CB

## 2021-07-30 NOTE — CONSULT NOTE ADULT - ATTENDING COMMENTS
Patient with metastatic breast cancer  Now presenting with severe anemia  Found to have CT A/P with nonenhancing debris in the bladder (clot) and soft tissue lesion around the distal right ureter and evidence of likely metastatic neoplasm.     UCx with Enterococcus faecalis  BCx with Enterococcus faecalis    I would utilize Ampicillin for treatment of her bacteremia  I would repeat BCx  Would check TTE to exclude vegetations  Adjust Ampicillin dosing based on renal function    Overall, Enterococcus bacteremia, UTI, Fever, GLEN    I will be away over this upcoming weekend. Please contact the Infectious Diseases Office with any further questions or concerns.     Eric Stewart M.D.  [facility] Division of Infectious Disease  8AM-5PM: Pager Number [phone]  After Hours (or if no response): Please contact the Infectious Diseases Office at [phone]     The above assessment and plan were discussed with medicine NP
69 yo F Hinduism, hx of breast Ca on active chemo (unsure of name) and anemia p/w reported vaginal bleeding. MICU consult for hypotension likely 2/2 ongoing hemorrhage and UTI sepsis:    #Hypotension:  Bleeding + sepsis.  - Patient does not want intubation wich would help lower metabolic demand while she has hemoglobin of 4 and fever.   - For now can give volume PRN.  Start ABx  - c/w EPO  and iron IV   - Start TXA 1g q8   -- LIMIT BLOOD DRAWS no need for routine CBCs  - cool down patient to ~36 degrees C   - can use tylenol for fevers  - follow up cultures- reasonable to treat with broad spectrum Abx- adjust based on sensitivities/specificities  - can use ativan prn, keppra for sz ppx- per neuro recs    Pt is not a MICU candidate at this time given her DNR/DNI status  Still a candidate for pressors if Blood pressure does not respond to volume.
Patient seen and examined. Agree with assessment and plan.    CT urogram    Patient is Jehovah witness with persistent gross hematuria. She was found to have critical anemia with Hct 13.    I had length discussion with patient and her medical proxy Hipolito Gray regarding her condition.  I discussed with her the risk of refusing blood products. She understands her refusal to accept blood products impacts her health and ability for surgical intervention in indicated.     She accepts her decision and all potential sequale of her decision. She declines all blood products. She will only accept EPO injections only.     Discussion was witnessed by ER Nurse Ruby

## 2021-07-30 NOTE — CONSULT NOTE ADULT - ASSESSMENT
69 yo F Sikhism, hx of breast Ca on active chemo (unsure of name) and anemia p/w reported vaginal bleeding/hematuria.     Found to have Hg 4.3. TVUS showed hematoma in the bladder and R nephrostomy tube.  Urology started CBI. CT urogram showed enhancing soft tissue surrounding distal R ureter (neoplasm?), innumerate liver lesions, small R pleural effusion with enhancing pleura, sclerotic lesions in spine.   Course c/b AMS, code stroke called, CTH neg.   Also with hypotension, seen by MICU, advised for TXA for anemia.  Ur Clx grew E faecalis.   Now with BCx growing E faecalis.     #E faecalis bacteremia, GLEN, acute anemia -   Received Vanc/cefepime (7/28-29). Ur clx shows pan-sensitive E  faecalis  BCx 7/29 with E faecalis on PCR. Suspect likely from urinary source with same pathogen.    Rec:  - can d/c vanc/cefepime  - start ampicillin 2gq4  - check repeat Bcx in AM  - consider TTE     Fran Chaudhary MD  Fellow, Infectious Diseases, PGY-4  Pager: [phone]  Before 9am or after 5pm/Weekends: Call [phone]   71 yo F Adventist, hx of breast Ca on active chemo (unsure of name) and anemia p/w reported vaginal bleeding/hematuria.     Found to have Hg 4.3. TVUS showed hematoma in the bladder and R nephrostomy tube.  Urology started CBI. CT urogram showed enhancing soft tissue surrounding distal R ureter (neoplasm?), innumerate liver lesions, small R pleural effusion with enhancing pleura, sclerotic lesions in spine.   Course c/b AMS, code stroke called, CTH neg.   Also with hypotension, seen by MICU, advised for TXA for anemia.  Ur Clx grew E faecalis.   Now with BCx growing E faecalis.     #E faecalis bacteremia, GLEN, acute anemia -   Received Vanc/cefepime (7/28-29). Ur clx shows pan-sensitive E  faecalis  BCx 7/29 with E faecalis on PCR. Suspect likely from urinary source with same pathogen.    Rec:  - can d/c vanc/cefepime  - start ampicillin 2gq12  - check repeat Bcx in AM  - consider TTE     rFan Chaudhary MD  Fellow, Infectious Diseases, PGY-4  Pager: [phone]  Before 9am or after 5pm/Weekends: Call [phone]   69 yo F Gnosticist, hx of breast Ca on active chemo (unsure of name) and anemia p/w reported vaginal bleeding/hematuria.     Found to have Hg 4.3. TVUS showed hematoma in the bladder and R nephrostomy tube.  Urology started CBI. CT urogram showed enhancing soft tissue surrounding distal R ureter (neoplasm?), innumerate liver lesions, small R pleural effusion with enhancing pleura, sclerotic lesions in spine.   Course c/b AMS, code stroke called, CTH neg.   Also with hypotension, seen by MICU, advised for TXA for anemia.  Ur Clx grew E faecalis.   Now with BCx growing E faecalis.     #E faecalis bacteremia, GLEN, acute anemia -   Received Vanc/cefepime (7/28-29). Ur clx shows pan-sensitive E  faecalis  BCx 7/29 with E faecalis on PCR. Suspect likely from urinary source with same pathogen.    Rec:  - can d/c vanc/cefepime  - start ampicillin 2gq12  - check repeat Bcx in AM  - recommend TTE     Fran Chaudhary MD  Fellow, Infectious Diseases, PGY-4  Pager: [phone]  Before 9am or after 5pm/Weekends: Call [phone]

## 2021-07-30 NOTE — CONSULT NOTE ADULT - ASSESSMENT
69 yo F Anabaptism, hx of metastatic breast cancer, anemia p/w hematuria, started on CBI, c/b RRT/ code stroke, presumed ischemic CVA    #Anemia, iron deficiency  - pt is [facility], does not accept blood products- established by patient per chart review prior to AMS  - received EPO 40, 000 unit 7/28; will hold on giving higher dose therapy (3x/week) as blood sparing treatment due to acute CVA  - on IV iron 200mg daily- continue  - will also give folic acid and B12  - LIMIT blood draws- no need for routine CBC    #hematuria-  - US/ CT with no intrauterine source, with + bladder hematoma on CBI, to be clamped, now very pale pink  - prior R nephroureteral catheter with mild residual r hydronephrosis; with soft tissue mass surrounding distal right ureter  - Urology is following  - CBI to be clamped  - as bleeding has resolved, would hold on further tranexamic acid for now    #Breast cancer, metastatic- now with widely metastatic disease  - recently established care at [facility], records in [facility] reviewed- initial dx 1996 R breast cancer s/p surgery, CMF, no endocrine therapy; had local recurrence, treated with endocrine agents  - developed Left breast cancer, s/p surgery, Aromasin  - malignant pleural effusion treated with femara/kisquali, followed by faslodex/ibrance; was following with Dr. Temple, scans in 4/2020 were PERLA, CT 2/2021 with R moderate pleural effusion and adenopathy, bone scan 2/21 with bone lesions  - last faslodex was 6/2019 prior to establishing care at [facility]  - has now been on faslodex, last 7/14; was also started on verzenio but held with low counts  - with cytopenias related to RT and prior treatment limiting options, now with AMS    #neuro, acute AMS- concern for basilar occlusion per Neurology  - no plan for further imaging  - on keppra    Pt is DNR/DNI, prognosis is guarded  d/w Dr. Barrett, d/w NP

## 2021-07-30 NOTE — CONSULT NOTE ADULT - SUBJECTIVE AND OBJECTIVE BOX
Patient is a 70y old  Female who presents with a chief complaint of severe anemia, acute blood loss 2/2 hematuria (30 Jul 2021 11:32)    HPI:  71 yo F Sikhism, hx of breast Ca on active chemo (unsure of name) and anemia p/w reported vaginal bleeding. Recent admission to [facility] for the same, underwent EPO treatment and discharged. Reported Hgb of 5 during admission. Feeling generally weak, denies chest pain sob. Reports bright red blood in the toilet when she urinates. Denies dysuria, abdominal pain. Patient is unable to receive blood products of any kind.     In the ED, found to have Hg 4.3. TVUS showed hematoma in the bladder and R nephostomy tube. Seen by Urology.   Admitted to medicine. Hg dropped overnight and started on CBI.   CT urogram showed enhancing soft tissue surrounding distal R ureter (?neoplasm?), innumerated liver lesions, small R pleural effusion with enhancing pleura.   Course c/b AMS and code stroke called. No evidence of stroke on CTH. MICU called as well for hypotension.  Pt was started on TXA for anemia.   Ur Clx grew E faecalis. Now with BCx growing E faecalis.      Pt febrile this AM to 101.2     Id consulted for bacteremia.  Pt minimally conversive, lethargic.      prior hospital charts reviewed [  ]  primary team notes reviewed [ x ]  other consultant notes reviewed [x  ]    PAST MEDICAL & SURGICAL HISTORY:  Anemia    No significant past surgical history      Allergies  No Known Allergies    ANTIMICROBIALS (past 90 days)  MEDICATIONS  (STANDING):    cefepime   IVPB   100 mL/Hr IV Intermittent (07-29-21 @ 17:57)    cefepime   IVPB   100 mL/Hr IV Intermittent (07-30-21 @ 05:25)    vancomycin  IVPB   166.67 mL/Hr IV Intermittent (07-29-21 @ 17:54)    vancomycin  IVPB   250 mL/Hr IV Intermittent (07-30-21 @ 12:35)      ANTIMICROBIALS:    cefepime   IVPB 1000 daily    OTHER MEDS: MEDICATIONS  (STANDING):  acetaminophen   Tablet .. 650 every 6 hours PRN  dextrose 40% Gel 15 once  dextrose 50% Injectable 25 once  dextrose 50% Injectable 12.5 once  dextrose 50% Injectable 25 once  furosemide   Injectable 40 every 12 hours  glucagon  Injectable 1 once  insulin glargine Injectable (LANTUS) 10 at bedtime  insulin lispro (ADMELOG) corrective regimen sliding scale  three times a day before meals  insulin lispro (ADMELOG) corrective regimen sliding scale  at bedtime  levETIRAcetam  IVPB 500 every 12 hours  midodrine. 10 three times a day  mirtazapine 15 at bedtime  oxyCODONE    IR 5 every 4 hours PRN  pantoprazole  Injectable 40 daily  sucralfate 1 four times a day  venlafaxine XR. 75 daily    SOCIAL HISTORY:   Unable to assess at pt currently nonverbal    FAMILY HISTORY: Unable to assess at pt currently nonverbal      REVIEW OF SYSTEMS  [  ] ROS unobtainable because:    [x  ] All other systems negative except as noted below:	    Constitutional:  [ ] fever [ ] chills  [ ] weight loss  [ ] weakness  Skin:  [ ] rash [ ] phlebitis	  Eyes: [ ] icterus [ ] pain  [ ] discharge	  ENMT: [ ] sore throat  [ ] thrush [ ] ulcers [ ] exudates  Respiratory: [ ] dyspnea [ ] hemoptysis [ ] cough [ ] sputum	  Cardiovascular:  [ ] chest pain [ ] palpitations [ ] edema	  Gastrointestinal:  [ ] nausea [ ] vomiting [ ] diarrhea [ ] constipation [ ] pain	  Genitourinary:  [ ] dysuria [ ] frequency [ x hematuria [ ] discharge [ ] flank pain  [ ] incontinence  Musculoskeletal:  [ ] myalgias [ ] arthralgias [ ] arthritis  [ ] back pain  Neurological:  [ ] headache [ ] seizures  [ ] confusion/altered mental status  Psychiatric:  [ ] anxiety [ ] depression	  Hematology/Lymphatics:  [ ] lymphadenopathy  Endocrine:  [ ] adrenal [ ] thyroid  Allergic/Immunologic:	 [ ] transplant [ ] seasonal    Vital Signs Last 24 Hrs  T(F): 97.2 (07-30-21 @ 11:49), Max: 101.2 (07-30-21 @ 04:58)  Vital Signs Last 24 Hrs  HR: 100 (07-30-21 @ 11:49) (100 - 116)  BP: 101/61 (07-30-21 @ 11:49) (83/40 - 129/54)  RR: 18 (07-30-21 @ 11:49)  SpO2: 93% (07-30-21 @ 11:49) (93% - 98%)  Wt(kg): --    EXAM:  Constitutional: nonverbal, no clinching eyes  Eyes: No icterus.  Oral cavity: Clear, NGT in place  Neck: No neck vein distension noted  RS: Chest clear to auscultation bilaterally. No wheeze/rhonchi/crepitations.  CVS: S1, S2 heard. Regular rate and rhythm. LSB murmur 2/6  Abdomen: Soft. No guarding/rigidity/tenderness.  : No acute abnormalities  Extremities: Warm. No pedal edema  Skin: No lesions noted  Vascular: No evidence of phlebitis  Neuro: lethargic                          4.1    6.88  )-----------( 203      ( 29 Jul 2021 15:56 )             13.6     07-29    135  |  100  |  35<H>  ----------------------------<  233<H>  5.4<H>   |  14<L>  |  2.11<H>    Ca    9.9      29 Jul 2021 16:14    TPro  8.0  /  Alb  3.5  /  TBili  0.3  /  DBili  x   /  AST  32  /  ALT  20  /  AlkPhos  178<H>  07-29    MICROBIOLOGY:  Culture - Blood (collected 29 Jul 2021 18:57)  Source: .Blood Blood-Peripheral  Gram Stain (30 Jul 2021 06:06):    Growth in aerobic bottle: Gram Positive Cocci in Pairs and Chains    Growth in anaerobic bottle: Gram Positive Cocci in Pairs and Chains  Preliminary Report (30 Jul 2021 06:06):    Growth in aerobic bottle: Gram Positive Cocci in Pairs and Chains    Growth in anaerobic bottle: Gram Positive Cocci in Pairs and Chains    ***Blood Panel PCR results on this specimen are available    approximately 3 hours after the Gram stain result.***    Gram stain, PCR, and/or culture results may not always    correspond due to difference in methodologies.    ************************************************************    This PCR assay was performed by multiplex PCR. This    Assay tests for 66 bacterial and resistance gene targets.    Please refer to the [facility] test directory    at [email or web address] for details.  Organism: Blood Culture PCR (30 Jul 2021 08:35)  Organism: Blood Culture PCR (30 Jul 2021 08:35)      -  Enterococcus faecalis: Detec      Method Type: PCR    Culture - Urine (collected 28 Jul 2021 01:19)  Source: Catheterized Catheterized  Final Report (30 Jul 2021 04:22):    >100,000 CFU/ml Enterococcus faecalis  Organism: Enterococcus faecalis (30 Jul 2021 04:22)  Organism: Enterococcus faecalis (30 Jul 2021 04:22)      -  Ampicillin: S <=2 Predicts results to ampicillin/sulbactam, amoxacillin-clavulanate and  piperacillin-tazobactam.      -  Ciprofloxacin: S <=1      -  Levofloxacin: S <=1      -  Nitrofurantoin: S <=32 Should not be used to treat pyelonephritis.      -  Tetra/Doxy: R >8      -  Vancomycin: S 2      Method Type: CB      RADIOLOGY:  imaging below personally reviewed    < from: US Transvaginal (07.27.21 @ 16:20) >    IMPRESSION:  Hematoma and layering debris within the bladder in the context of right-sided nephroureterostomy and mild hydronephrosis.  No intrauterine pathology.      < from: CT Abdomen and Pelvis w/wo IV Cont (07.27.21 @ 21:54) >  IMPRESSION:  1.  Nonenhancing debris within the urinary bladder, likely reflecting blood clot, as seen on the prior ultrasound exam.  2.  Amorphous enhancing soft tissue surrounding the distal right ureter, which is of uncertain etiology but potentially reflecting neoplasm. Correlation with prior imaging would be helpful.  3.  Scattered sclerotic lesions within the visualized spine, indeterminate but suspicious for osseous metastatic disease. Age-indeterminate mild compression fracture deformities of T8 and T10 with associated sclerotic changes potentially due to degenerative changes, though metastatic disease is not excluded.  4.  Right nephroureteral catheter with mild residual right hydronephrosis.  5.  Atrophy of the left kidney with asymmetricly decreased contrast excretion, likely reflecting decreased renal function. No left hydronephrosis.  6.  Numerous subcentimeter hypoattenuating liver lesions which are indeterminate. Metastatic disease is not excluded. Consider further assessment with nonemergent abdominal MRI if not already performed.  7.  Small right pleural effusion with mildly thickened and enhancing pleura, which may be seen in the setting of infection or neoplastic involvement.  8.  Fecalization of the small bowel, which may be seen in the setting of stasis/small bowel bacterial overgrowth. No evidence of obstruction. Moderate colonic stool burden, likely reflecting constipation.    < from: Xray Chest 1 View- PORTABLE-Urgent (Xray Chest 1 View- PORTABLE-Urgent .) (07.30.21 @ 08:44) >  IMPRESSION:  Feeding tube tip terminates in the stomach.  Small/moderate right pleural effusion and right middle and lower lobe linear opacities, likely atelectasis unchanged.      < from: CT Head No Cont (07.29.21 @ 16:16) >  IMPRESSION:  No intracranial hemorrhage  Dr. Munoz discussed these findings with Dr. Rodgers on 7/29/2021 4:17 PM with read back.     Patient is a 70y old  Female who presents with a chief complaint of severe anemia, acute blood loss 2/2 hematuria (30 Jul 2021 11:32)    HPI:  69 yo F Christian, hx of breast Ca on active chemo (unsure of name) and anemia p/w reported vaginal bleeding. Recent admission to [facility] for the same, underwent EPO treatment and discharged. Reported Hgb of 5 during admission. Feeling generally weak, denies chest pain sob. Reports bright red blood in the toilet when she urinates. Denies dysuria, abdominal pain. Patient is unable to receive blood products of any kind.     In the ED, found to have Hg 4.3. TVUS showed hematoma in the bladder and R nephostomy tube. Seen by Urology.   Admitted to medicine. Hg dropped overnight and started on CBI.   CT urogram showed enhancing soft tissue surrounding distal R ureter (?neoplasm?), innumerated liver lesions, small R pleural effusion with enhancing pleura.   Course c/b AMS and code stroke called. No evidence of stroke on CTH. MICU called as well for hypotension.  Pt was started on TXA for anemia.   Ur Clx grew E faecalis. Now with BCx growing E faecalis.      Pt febrile this AM to 101.2     Id consulted for bacteremia.  Pt minimally conversive, lethargic.      prior hospital charts reviewed [  ]  primary team notes reviewed [ x ]  other consultant notes reviewed [x  ]    PAST MEDICAL & SURGICAL HISTORY:  Anemia    No significant past surgical history      Allergies  No Known Allergies    ANTIMICROBIALS (past 90 days)  MEDICATIONS  (STANDING):    cefepime   IVPB   100 mL/Hr IV Intermittent (07-29-21 @ 17:57)    cefepime   IVPB   100 mL/Hr IV Intermittent (07-30-21 @ 05:25)    vancomycin  IVPB   166.67 mL/Hr IV Intermittent (07-29-21 @ 17:54)    vancomycin  IVPB   250 mL/Hr IV Intermittent (07-30-21 @ 12:35)      ANTIMICROBIALS:    cefepime   IVPB 1000 daily    OTHER MEDS: MEDICATIONS  (STANDING):  acetaminophen   Tablet .. 650 every 6 hours PRN  dextrose 40% Gel 15 once  dextrose 50% Injectable 25 once  dextrose 50% Injectable 12.5 once  dextrose 50% Injectable 25 once  furosemide   Injectable 40 every 12 hours  glucagon  Injectable 1 once  insulin glargine Injectable (LANTUS) 10 at bedtime  insulin lispro (ADMELOG) corrective regimen sliding scale  three times a day before meals  insulin lispro (ADMELOG) corrective regimen sliding scale  at bedtime  levETIRAcetam  IVPB 500 every 12 hours  midodrine. 10 three times a day  mirtazapine 15 at bedtime  oxyCODONE    IR 5 every 4 hours PRN  pantoprazole  Injectable 40 daily  sucralfate 1 four times a day  venlafaxine XR. 75 daily    SOCIAL HISTORY:   Unable to assess at pt currently nonverbal    FAMILY HISTORY: Unable to assess at pt currently nonverbal      REVIEW OF SYSTEMS  [  ] ROS unobtainable because:    [x  ] All other systems negative except as noted below:	    Constitutional:  [ ] fever [ ] chills  [ ] weight loss  [ ] weakness  Skin:  [ ] rash [ ] phlebitis	  Eyes: [ ] icterus [ ] pain  [ ] discharge	  ENMT: [ ] sore throat  [ ] thrush [ ] ulcers [ ] exudates  Respiratory: [ ] dyspnea [ ] hemoptysis [ ] cough [ ] sputum	  Cardiovascular:  [ ] chest pain [ ] palpitations [ ] edema	  Gastrointestinal:  [ ] nausea [ ] vomiting [ ] diarrhea [ ] constipation [ ] pain	  Genitourinary:  [ ] dysuria [ ] frequency [ x hematuria [ ] discharge [ ] flank pain  [ ] incontinence  Musculoskeletal:  [ ] myalgias [ ] arthralgias [ ] arthritis  [ ] back pain  Neurological:  [ ] headache [ ] seizures  [ ] confusion/altered mental status  Psychiatric:  [ ] anxiety [ ] depression	  Hematology/Lymphatics:  [ ] lymphadenopathy  Endocrine:  [ ] adrenal [ ] thyroid  Allergic/Immunologic:	 [ ] transplant [ ] seasonal    Vital Signs Last 24 Hrs  T(F): 97.2 (07-30-21 @ 11:49), Max: 101.2 (07-30-21 @ 04:58)  Vital Signs Last 24 Hrs  HR: 100 (07-30-21 @ 11:49) (100 - 116)  BP: 101/61 (07-30-21 @ 11:49) (83/40 - 129/54)  RR: 18 (07-30-21 @ 11:49)  SpO2: 93% (07-30-21 @ 11:49) (93% - 98%)  Wt(kg): --    EXAM:  Constitutional: nonverbal, no clinching eyes  Eyes: No icterus.  Oral cavity: Clear, NGT in place  Neck: No neck vein distension noted  RS: Chest clear to auscultation bilaterally. No wheeze/rhonchi/crepitations.  CVS: S1, S2 heard. Regular rate and rhythm. LSB murmur 2/6  Abdomen: Soft. No guarding/rigidity/tenderness.  : No acute abnormalities  Extremities: Warm. No pedal edema  Skin: No lesions noted  Vascular: No evidence of phlebitis  Neuro: lethargic                          4.1    6.88  )-----------( 203      ( 29 Jul 2021 15:56 )             13.6     07-29    135  |  100  |  35<H>  ----------------------------<  233<H>  5.4<H>   |  14<L>  |  2.11<H>    Ca    9.9      29 Jul 2021 16:14    TPro  8.0  /  Alb  3.5  /  TBili  0.3  /  DBili  x   /  AST  32  /  ALT  20  /  AlkPhos  178<H>  07-29    MICROBIOLOGY:  Culture - Blood (collected 29 Jul 2021 18:57)  Source: .Blood Blood-Peripheral  Gram Stain (30 Jul 2021 06:06):    Growth in aerobic bottle: Gram Positive Cocci in Pairs and Chains    Growth in anaerobic bottle: Gram Positive Cocci in Pairs and Chains  Preliminary Report (30 Jul 2021 06:06):    Growth in aerobic bottle: Gram Positive Cocci in Pairs and Chains    Growth in anaerobic bottle: Gram Positive Cocci in Pairs and Chains    ***Blood Panel PCR results on this specimen are available    approximately 3 hours after the Gram stain result.***    Gram stain, PCR, and/or culture results may not always    correspond due to difference in methodologies.    ************************************************************    This PCR assay was performed by multiplex PCR. This    Assay tests for 66 bacterial and resistance gene targets.    Please refer to the [facility] test directory    at [email or web address] for details.  Organism: Blood Culture PCR (30 Jul 2021 08:35)  Organism: Blood Culture PCR (30 Jul 2021 08:35)      -  Enterococcus faecalis: Detec      Method Type: PCR    Culture - Urine (collected 28 Jul 2021 01:19)  Source: Catheterized Catheterized  Final Report (30 Jul 2021 04:22):    >100,000 CFU/ml Enterococcus faecalis  Organism: Enterococcus faecalis (30 Jul 2021 04:22)  Organism: Enterococcus faecalis (30 Jul 2021 04:22)      -  Ampicillin: S <=2 Predicts results to ampicillin/sulbactam, amoxacillin-clavulanate and  piperacillin-tazobactam.      -  Ciprofloxacin: S <=1      -  Levofloxacin: S <=1      -  Nitrofurantoin: S <=32 Should not be used to treat pyelonephritis.      -  Tetra/Doxy: R >8      -  Vancomycin: S 2      Method Type: CB      RADIOLOGY:    <The imaging below has been reviewed and visualized by me independently. Findings as detailed in report below>    < from: US Transvaginal (07.27.21 @ 16:20) >    IMPRESSION:  Hematoma and layering debris within the bladder in the context of right-sided nephroureterostomy and mild hydronephrosis.  No intrauterine pathology.      < from: CT Abdomen and Pelvis w/wo IV Cont (07.27.21 @ 21:54) >  IMPRESSION:  1.  Nonenhancing debris within the urinary bladder, likely reflecting blood clot, as seen on the prior ultrasound exam.  2.  Amorphous enhancing soft tissue surrounding the distal right ureter, which is of uncertain etiology but potentially reflecting neoplasm. Correlation with prior imaging would be helpful.  3.  Scattered sclerotic lesions within the visualized spine, indeterminate but suspicious for osseous metastatic disease. Age-indeterminate mild compression fracture deformities of T8 and T10 with associated sclerotic changes potentially due to degenerative changes, though metastatic disease is not excluded.  4.  Right nephroureteral catheter with mild residual right hydronephrosis.  5.  Atrophy of the left kidney with asymmetricly decreased contrast excretion, likely reflecting decreased renal function. No left hydronephrosis.  6.  Numerous subcentimeter hypoattenuating liver lesions which are indeterminate. Metastatic disease is not excluded. Consider further assessment with nonemergent abdominal MRI if not already performed.  7.  Small right pleural effusion with mildly thickened and enhancing pleura, which may be seen in the setting of infection or neoplastic involvement.  8.  Fecalization of the small bowel, which may be seen in the setting of stasis/small bowel bacterial overgrowth. No evidence of obstruction. Moderate colonic stool burden, likely reflecting constipation.    < from: Xray Chest 1 View- PORTABLE-Urgent (Xray Chest 1 View- PORTABLE-Urgent .) (07.30.21 @ 08:44) >  IMPRESSION:  Feeding tube tip terminates in the stomach.  Small/moderate right pleural effusion and right middle and lower lobe linear opacities, likely atelectasis unchanged.      < from: CT Head No Cont (07.29.21 @ 16:16) >  IMPRESSION:  No intracranial hemorrhage  Dr. Munoz discussed these findings with Dr. Rodgers on 7/29/2021 4:17 PM with read back.

## 2021-07-30 NOTE — PROGRESS NOTE ADULT - ASSESSMENT
71 yo F Zoroastrianism, hx of breast Ca on active chemo (unsure of name) and anemia p/w reported vaginal bleeding. Recent admission to [facility] for the same, underwent EPO treatment and discharged. Reported Hgb of 5 during admission. Feeling generally weak, denies chest pain sob. Reports bright red blood in the toilet when she urinates. Denies dysuria, abdominal pain. Patient is unable to receive blood products of any kind.  has been tachycardic.

## 2021-07-30 NOTE — PROGRESS NOTE ADULT - ASSESSMENT
71 yo Female Jehovah witness with PMHx of breast CA s/p BL mastectomy with recurrence and metastasis here with c/o vaginal bleeding, found to have bladder hematoma on pelvic ultrasound. Pt also with R sided nephU with mild hydro, but pt unsure indication for nephU. H/H 4.3/13.5, SCr 1.37. Pt follows up at [facility] and is currently receiving chemotherapy.   CT urogram reviewed, demonstrates soft tissue enhancement at distal R ureter (possible metastatic disease?), mild right hydro with R nephroU in place, L kidney atrophy, debris within bladder consistent with clot.    Urine culture: E faecalis  Blood culture: gram positive cocci    Recs:  - gross hematuria (resolved), no indication for CBI at this time (can disconnect from CBI and plug 3rd port)  - given acute mental status change and concern for stroke, would continue with diaz catheter at this time; should mental status improve, no urologic contraindication to trial of void  - continue broad spectrum abx, narrow per sensitivities of urine/blood cultures  - if patient fails to improve on antibiotics, recommend IR consultation for nephroureteral catheter exchange as unknown timing of placement/exchange and this may be nidus for infection if overdue  - no further urologic investigations of interventions warranted at this time, please do not hesitate to contact urology should any additional urologic concerns or questions arise  - discussed with attending, Dr. Kemp

## 2021-07-30 NOTE — PROGRESS NOTE ADULT - SUBJECTIVE AND OBJECTIVE BOX
Patient is a 70y old  Female who presents with a chief complaint of severe anemia, acute blood loss 2/2 hematuria (30 Jul 2021 14:22)      SUBJECTIVE / OVERNIGHT EVENTS: ptn is morwe responsive today though minimally, has enterococcal bacteremia. seen by ID , placed on IV AMPICILLIN while awaiting sensitivities. rpt blood cx sent. will check HH in am. all blood draws should be done in pediatric [facility]     MEDICATIONS  (STANDING):  ampicillin  IVPB      brimonidine 0.2% Ophthalmic Solution 1 Drop(s) Both EYES two times a day  cyanocobalamin Injectable 1000 MICROGram(s) IntraMuscular daily  dextrose 40% Gel 15 Gram(s) Oral once  dextrose 5%. 1000 milliLiter(s) (50 mL/Hr) IV Continuous <Continuous>  dextrose 5%. 1000 milliLiter(s) (100 mL/Hr) IV Continuous <Continuous>  dextrose 50% Injectable 25 Gram(s) IV Push once  dextrose 50% Injectable 12.5 Gram(s) IV Push once  dextrose 50% Injectable 25 Gram(s) IV Push once  folic acid 1 milliGRAM(s) Enteral Tube daily  furosemide   Injectable 40 milliGRAM(s) IV Push every 12 hours  glucagon  Injectable 1 milliGRAM(s) IntraMuscular once  insulin glargine Injectable (LANTUS) 10 Unit(s) SubCutaneous at bedtime  insulin lispro (ADMELOG) corrective regimen sliding scale   SubCutaneous every 6 hours  iron sucrose IVPB 200 milliGRAM(s) IV Intermittent every 24 hours  latanoprost 0.005% Ophthalmic Solution 1 Drop(s) Both EYES at bedtime  levETIRAcetam  IVPB 500 milliGRAM(s) IV Intermittent every 12 hours  midodrine. 10 milliGRAM(s) Oral three times a day  mirtazapine 15 milliGRAM(s) Oral at bedtime  pantoprazole  Injectable 40 milliGRAM(s) IV Push daily  sodium bicarbonate  Infusion 0.117 mEq/kG/Hr (75 mL/Hr) IV Continuous <Continuous>  sodium zirconium cyclosilicate 10 Gram(s) Oral daily  venlafaxine 37.5 milliGRAM(s) Oral every 12 hours    MEDICATIONS  (PRN):  acetaminophen   Tablet .. 650 milliGRAM(s) Oral every 6 hours PRN Temp greater or equal to 38C (100.4F), Mild Pain (1 - 3)  artificial  tears Solution 1 Drop(s) Both EYES four times a day PRN Dry Eyes  oxyCODONE    IR 5 milliGRAM(s) Oral every 4 hours PRN Moderate Pain (4 - 6)      Vital Signs Last 24 Hrs  T(F): 98 (07-30-21 @ 21:23), Max: 101.2 (07-30-21 @ 04:58)  HR: 111 (07-30-21 @ 21:23) (96 - 116)  BP: 101/67 (07-30-21 @ 21:23) (98/60 - 129/54)  RR: 17 (07-30-21 @ 21:23) (17 - 18)  SpO2: 97% (07-30-21 @ 21:23) (93% - 98%)  Telemetry:   CAPILLARY BLOOD GLUCOSE      POCT Blood Glucose.: 216 mg/dL (30 Jul 2021 21:29)  POCT Blood Glucose.: 209 mg/dL (30 Jul 2021 17:30)  POCT Blood Glucose.: 210 mg/dL (30 Jul 2021 12:05)  POCT Blood Glucose.: 206 mg/dL (30 Jul 2021 09:30)    I&O's Summary    29 Jul 2021 07:01  -  30 Jul 2021 07:00  --------------------------------------------------------  IN: 93682 mL / OUT: 76761 mL / NET: -90034 mL    30 Jul 2021 07:01  -  30 Jul 2021 23:25  --------------------------------------------------------  IN: 6300 mL / OUT: 7790 mL / NET: -1490 mL        PHYSICAL EXAM:  GENERAL: NAD, well-developed  HEAD:  Atraumatic, Normocephalic  EYES: EOMI, PERRLA, conjunctiva and sclera clear  NECK: Supple, No JVD  CHEST/LUNG: Clear to auscultation bilaterally; No wheeze  HEART: Regular rate and rhythm; No murmurs, rubs, or gallops  ABDOMEN: Soft, Nontender, Nondistended; Bowel sounds present  EXTREMITIES:  2+ Peripheral Pulses, No clubbing, cyanosis, or edema  PSYCH: AAOx3  NEUROLOGY: non-focal  SKIN: No rashes or lesions    LABS:                        4.1    6.88  )-----------( 203      ( 29 Jul 2021 15:56 )             13.6     07-30    133<L>  |  104  |  42<H>  ----------------------------<  205<H>  3.9   |  16<L>  |  2.25<H>    Ca    8.8      30 Jul 2021 14:19    TPro  8.0  /  Alb  3.5  /  TBili  0.3  /  DBili  x   /  AST  32  /  ALT  20  /  AlkPhos  178<H>  07-29    PT/INR - ( 29 Jul 2021 16:14 )   PT: 13.6 sec;   INR: 1.14 ratio         PTT - ( 29 Jul 2021 16:14 )  PTT:30.2 sec          RADIOLOGY & ADDITIONAL TESTS:    Imaging Personally Reviewed:    Consultant(s) Notes Reviewed:      Care Discussed with Consultants/Other Providers:

## 2021-07-30 NOTE — CHART NOTE - NSCHARTNOTEFT_GEN_A_CORE
Notified by RN for +blood cultures. Results are as follow...      Culture - Blood (collected 29 Jul 2021 18:57)  Source: .Blood Blood-Peripheral  Gram Stain (30 Jul 2021 06:06):    Growth in aerobic bottle: Gram Positive Cocci in Pairs and Chains    Growth in anaerobic bottle: Gram Positive Cocci in Pairs and Chains  Preliminary Report (30 Jul 2021 06:06):    Growth in aerobic bottle: Gram Positive Cocci in Pairs and Chains    Growth in anaerobic bottle: Gram Positive Cocci in Pairs and Chains    ***Blood Panel PCR results on this specimen are available    approximately 3 hours after the Gram stain result.***    Gram stain, PCR, and/or culture results may not always    correspond due to difference in methodologies.    ************************************************************    This PCR assay was performed by multiplex PCR. This    Assay tests for 66 bacterial and resistance gene targets.    Please refer to the [facility] test directory    at [email or web address] for details.    Culture - Urine (collected 28 Jul 2021 01:19)  Source: Catheterized Catheterized  Final Report (30 Jul 2021 04:22):    >100,000 CFU/ml Enterococcus faecalis  Organism: Enterococcus faecalis (30 Jul 2021 04:22)  Organism: Enterococcus faecalis (30 Jul 2021 04:22)    Assessment/Plan:  >VS hemodynamically stable aside from T-101.4  >IV tylenol given  >Blood cultures results as above, f/u official PCR/sensitivities  >Called Dr. Barrett, will give x1 dose of vanco and place NGT for meds (midodrine etc)  >C/w cefepime  >Pharmacy called given GLEN, Pt received 1250 vanco 7/29 at 3PM, ok to give 1gm at 12 noon (ordered)  >Pt seen and evaluated at bedside, still lethargic, moving all 4 extremities unable to follow commands  >NGT placed in L nare  >CXR ordered urgent to confirm placement  >ID f/u in AM  >Will endorse to AM team, attending to follow    Kathia Burch PA-C  Department of Medicine

## 2021-07-30 NOTE — PROGRESS NOTE ADULT - ASSESSMENT
69 yo Female Jehovah witness with PMHx of breast CA s/p BL mastectomy with recurrence and metastasis here with c/o vaginal bleeding, found to have bladder hematoma on pelvic ultrasound. Pt also with R sided nephU with mild hydro, but pt unsure indication for nephU.   on admission: H/H 4.3/13.5, SCr 1.37.  Pt follows up at [facility] and is currently receiving chemotherapy.     7/28: Hct 11.2 from 13.5 yesterday. ptn is pale, lethargic, dyspneic, expresses wishes not to have any blood products based on her GD DOV. ptn placed her proxy on the phone Mr. Gray who wanted to confirm the hospital is respecting ptn's wishes. both ptn and the proxy aksed baout being transferred to [facility]. however ptn is not stable w HGB of 3 to be travelling. denies CP, palps, HA, has ongoing hematuria, CBI in place. Ct C/A/P reviewed. ptn w metastatic dz process on CT scan. Heme consult called. will cont CBI as per . pending CT urogram. cont trending HH. started on IV IRON. prognosis is guarded.   7/29: ptn found unresponsive with a droop, fever, ongoing hematuria, had filled out a MOLST form , she is DNR/DNI, code stroke called, will give Abx, keep temp down w cooling blanket, since ptn cannot receive anticoagulation/is dnr/dni will not get CTA of brain, will place on KEPPRA for sz, get eeg, prognosis is grave. this was d/w ,Mr Gray ptn's proxy. MICU consult reviewed. prognosis is grave  7/30:   ptn is morwe responsive today though minimally, has enterococcal bacteremia. seen by ID , placed on IV AMPICILLIN while awaiting sensitivities. rpt blood cx sent. will check HH in am. all blood draws should be done in pediatric [facility] . GLEN, ongoing 2/2 ischemic ATN, hematuria improving

## 2021-07-31 LAB
ALBUMIN SERPL ELPH-MCNC: 3.2 G/DL — LOW (ref 3.3–5)
ALP SERPL-CCNC: 165 U/L — HIGH (ref 40–120)
ALT FLD-CCNC: 247 U/L — HIGH (ref 10–45)
ANION GAP SERPL CALC-SCNC: 17 MMOL/L — SIGNIFICANT CHANGE UP (ref 5–17)
AST SERPL-CCNC: 240 U/L — HIGH (ref 10–40)
BILIRUB SERPL-MCNC: 0.2 MG/DL — SIGNIFICANT CHANGE UP (ref 0.2–1.2)
BUN SERPL-MCNC: 46 MG/DL — HIGH (ref 7–23)
CALCIUM SERPL-MCNC: 9.3 MG/DL — SIGNIFICANT CHANGE UP (ref 8.4–10.5)
CHLORIDE SERPL-SCNC: 114 MMOL/L — HIGH (ref 96–108)
CO2 SERPL-SCNC: 21 MMOL/L — LOW (ref 22–31)
CREAT SERPL-MCNC: 2.08 MG/DL — HIGH (ref 0.5–1.3)
GLUCOSE BLDC GLUCOMTR-MCNC: 241 MG/DL — HIGH (ref 70–99)
GLUCOSE BLDC GLUCOMTR-MCNC: 298 MG/DL — HIGH (ref 70–99)
GLUCOSE BLDC GLUCOMTR-MCNC: 360 MG/DL — HIGH (ref 70–99)
GLUCOSE BLDC GLUCOMTR-MCNC: 392 MG/DL — HIGH (ref 70–99)
GLUCOSE SERPL-MCNC: 274 MG/DL — HIGH (ref 70–99)
HCT VFR BLD CALC: 12 % — CRITICAL LOW (ref 34.5–45)
HGB BLD-MCNC: 3.6 G/DL — CRITICAL LOW (ref 11.5–15.5)
MCHC RBC-ENTMCNC: 30 GM/DL — LOW (ref 32–36)
MCHC RBC-ENTMCNC: 34 PG — SIGNIFICANT CHANGE UP (ref 27–34)
MCV RBC AUTO: 113.2 FL — HIGH (ref 80–100)
NRBC # BLD: 3 /100 WBCS — HIGH (ref 0–0)
PLATELET # BLD AUTO: 184 K/UL — SIGNIFICANT CHANGE UP (ref 150–400)
POTASSIUM SERPL-MCNC: 3.4 MMOL/L — LOW (ref 3.5–5.3)
POTASSIUM SERPL-SCNC: 3.4 MMOL/L — LOW (ref 3.5–5.3)
PROT SERPL-MCNC: 7.3 G/DL — SIGNIFICANT CHANGE UP (ref 6–8.3)
RBC # BLD: 1.06 M/UL — LOW (ref 3.8–5.2)
RBC # FLD: 25.3 % — HIGH (ref 10.3–14.5)
SODIUM SERPL-SCNC: 152 MMOL/L — HIGH (ref 135–145)
WBC # BLD: 6.15 K/UL — SIGNIFICANT CHANGE UP (ref 3.8–10.5)
WBC # FLD AUTO: 6.15 K/UL — SIGNIFICANT CHANGE UP (ref 3.8–10.5)

## 2021-07-31 RX ORDER — SODIUM BICARBONATE 1 MEQ/ML
0.08 SYRINGE (ML) INTRAVENOUS
Qty: 75 | Refills: 0 | Status: DISCONTINUED | OUTPATIENT
Start: 2021-07-31 | End: 2021-07-31

## 2021-07-31 RX ORDER — ACETAMINOPHEN 500 MG
975 TABLET ORAL ONCE
Refills: 0 | Status: COMPLETED | OUTPATIENT
Start: 2021-07-31 | End: 2021-07-31

## 2021-07-31 RX ORDER — FUROSEMIDE 40 MG
40 TABLET ORAL DAILY
Refills: 0 | Status: DISCONTINUED | OUTPATIENT
Start: 2021-07-31 | End: 2021-08-01

## 2021-07-31 RX ORDER — SODIUM CHLORIDE 9 MG/ML
1000 INJECTION, SOLUTION INTRAVENOUS
Refills: 0 | Status: DISCONTINUED | OUTPATIENT
Start: 2021-07-31 | End: 2021-08-01

## 2021-07-31 RX ORDER — ACETAMINOPHEN 500 MG
975 TABLET ORAL ONCE
Refills: 0 | Status: DISCONTINUED | OUTPATIENT
Start: 2021-07-31 | End: 2021-07-31

## 2021-07-31 RX ORDER — TRANEXAMIC ACID 100 MG/ML
500 INJECTION, SOLUTION INTRAVENOUS ONCE
Refills: 0 | Status: COMPLETED | OUTPATIENT
Start: 2021-07-31 | End: 2021-07-31

## 2021-07-31 RX ORDER — POTASSIUM CHLORIDE 20 MEQ
40 PACKET (EA) ORAL ONCE
Refills: 0 | Status: COMPLETED | OUTPATIENT
Start: 2021-07-31 | End: 2021-07-31

## 2021-07-31 RX ADMIN — LEVETIRACETAM 400 MILLIGRAM(S): 250 TABLET, FILM COATED ORAL at 14:50

## 2021-07-31 RX ADMIN — MIDODRINE HYDROCHLORIDE 10 MILLIGRAM(S): 2.5 TABLET ORAL at 05:02

## 2021-07-31 RX ADMIN — Medication 40 MILLIEQUIVALENT(S): at 19:49

## 2021-07-31 RX ADMIN — Medication 40 MILLIGRAM(S): at 05:01

## 2021-07-31 RX ADMIN — Medication 37.5 MILLIGRAM(S): at 17:40

## 2021-07-31 RX ADMIN — INSULIN GLARGINE 10 UNIT(S): 100 INJECTION, SOLUTION SUBCUTANEOUS at 21:39

## 2021-07-31 RX ADMIN — BRIMONIDINE TARTRATE 1 DROP(S): 2 SOLUTION/ DROPS OPHTHALMIC at 05:01

## 2021-07-31 RX ADMIN — Medication 5: at 18:00

## 2021-07-31 RX ADMIN — BRIMONIDINE TARTRATE 1 DROP(S): 2 SOLUTION/ DROPS OPHTHALMIC at 17:41

## 2021-07-31 RX ADMIN — MIDODRINE HYDROCHLORIDE 10 MILLIGRAM(S): 2.5 TABLET ORAL at 17:44

## 2021-07-31 RX ADMIN — PANTOPRAZOLE SODIUM 40 MILLIGRAM(S): 20 TABLET, DELAYED RELEASE ORAL at 14:51

## 2021-07-31 RX ADMIN — LATANOPROST 1 DROP(S): 0.05 SOLUTION/ DROPS OPHTHALMIC; TOPICAL at 21:09

## 2021-07-31 RX ADMIN — Medication 975 MILLIGRAM(S): at 22:21

## 2021-07-31 RX ADMIN — Medication 5: at 21:39

## 2021-07-31 RX ADMIN — Medication 2: at 04:12

## 2021-07-31 RX ADMIN — Medication 216 GRAM(S): at 05:01

## 2021-07-31 RX ADMIN — MIRTAZAPINE 15 MILLIGRAM(S): 45 TABLET, ORALLY DISINTEGRATING ORAL at 21:09

## 2021-07-31 RX ADMIN — Medication 975 MILLIGRAM(S): at 23:21

## 2021-07-31 RX ADMIN — MIDODRINE HYDROCHLORIDE 10 MILLIGRAM(S): 2.5 TABLET ORAL at 12:28

## 2021-07-31 RX ADMIN — SODIUM CHLORIDE 50 MILLILITER(S): 9 INJECTION, SOLUTION INTRAVENOUS at 20:15

## 2021-07-31 RX ADMIN — IRON SUCROSE 110 MILLIGRAM(S): 20 INJECTION, SOLUTION INTRAVENOUS at 15:35

## 2021-07-31 RX ADMIN — Medication 37.5 MILLIGRAM(S): at 05:02

## 2021-07-31 RX ADMIN — LEVETIRACETAM 400 MILLIGRAM(S): 250 TABLET, FILM COATED ORAL at 21:09

## 2021-07-31 RX ADMIN — TRANEXAMIC ACID 210 MILLIGRAM(S): 100 INJECTION, SOLUTION INTRAVENOUS at 19:42

## 2021-07-31 RX ADMIN — Medication 1 MILLIGRAM(S): at 12:28

## 2021-07-31 RX ADMIN — PREGABALIN 1000 MICROGRAM(S): 225 CAPSULE ORAL at 12:27

## 2021-07-31 RX ADMIN — Medication 3: at 11:31

## 2021-07-31 RX ADMIN — Medication 216 GRAM(S): at 17:44

## 2021-07-31 NOTE — PROGRESS NOTE ADULT - PROBLEM SELECTOR PLAN 1
Secondary to severe anemia and bacteremia   iron transfusion   IV abx for bacteremia   Monitor on tele  TTE

## 2021-07-31 NOTE — PROGRESS NOTE ADULT - ASSESSMENT
69 yo Female Jehovah witness with PMHx of breast CA s/p BL mastectomy with recurrence and metastasis here with c/o vaginal bleeding, found to have bladder hematoma on pelvic ultrasound. Pt also with R sided nephU with mild hydro, but pt unsure indication for nephU.   on admission: H/H 4.3/13.5, SCr 1.37.  Pt follows up at [facility] and is currently receiving chemotherapy.     7/28: Hct 11.2 from 13.5 yesterday. ptn is pale, lethargic, dyspneic, expresses wishes not to have any blood products based on her GD DOV. ptn placed her proxy on the phone Mr. Gray who wanted to confirm the hospital is respecting ptn's wishes. both ptn and the proxy aksed baout being transferred to [facility]. however ptn is not stable w HGB of 3 to be travelling. denies CP, palps, HA, has ongoing hematuria, CBI in place. Ct C/A/P reviewed. ptn w metastatic dz process on CT scan. Heme consult called. will cont CBI as per . pending CT urogram. cont trending HH. started on IV IRON. prognosis is guarded.   7/29: ptn found unresponsive with a droop, fever, ongoing hematuria, had filled out a MOLST form , she is DNR/DNI, code stroke called, will give Abx, keep temp down w cooling blanket, since ptn cannot receive anticoagulation/is dnr/dni will not get CTA of brain, will place on KEPPRA for sz, get eeg, prognosis is grave. this was d/w ,Mr Gray ptn's proxy. MICU consult reviewed. prognosis is grave  7/30:   ptn is morwe responsive today though minimally, has enterococcal bacteremia. seen by ID , placed on IV AMPICILLIN while awaiting sensitivities. rpt blood cx sent. will check HH in am. all blood draws should be done in pediatric [facility] . GLEN, ongoing 2/2 ischemic ATN, hematuria improving  7/31:  little more responsive today, has hypernatremia, will start free water via NGT. has clots in CBI. rpt hgb3.6, ptn is hyperglycemic. will call endo

## 2021-07-31 NOTE — PROGRESS NOTE ADULT - SUBJECTIVE AND OBJECTIVE BOX
Overnight events noted      VITAL:  T(C): , Max: 36.7 (07-30-21 @ 21:23)  T(F): , Max: 98 (07-30-21 @ 21:23)  HR: 112 (07-31-21 @ 11:30)  BP: 138/76 (07-31-21 @ 11:30)  BP(mean): --  RR: 18 (07-31-21 @ 11:30)  SpO2: 97% (07-31-21 @ 11:30)      PHYSICAL EXAM:  Constitutional: obtunded  HEENT: NCAT, DMM  Neck: Supple, No JVD  Respiratory: CTA-b/l  Cardiovascular: RRR s1s2, no m/r/g  Gastrointestinal: BS+, soft, NT/ND  : diaz-cbi - pink-tinged urine  Extremities: No peripheral edema b/l  Neurological: no focal deficits; strength grossly intact  Back: no CVAT b/l  Skin: No rashes, no nevi      LABS:                        4.1    6.88  )-----------( 203      ( 29 Jul 2021 15:56 )             13.6     Na(133)/K(3.9)/Cl(104)/HCO3(16)/BUN(42)/Cr(2.25)Glu(205)/Ca(8.8)/Mg(--)/PO4(--)    07-30 @ 14:19  Na(135)/K(5.4)/Cl(100)/HCO3(14)/BUN(35)/Cr(2.11)Glu(233)/Ca(9.9)/Mg(--)/PO4(--)    07-29 @ 16:14  Na(131)/K(4.7)/Cl(97)/HCO3(12)/BUN(33)/Cr(1.94)Glu(272)/Ca(9.7)/Mg(--)/PO4(--)    07-29 @ 14:52      IMPRESSION: 70F, Scientologist, with breast CA, 7/27/21 p/w vaginal bleeding/severe anemia    (1)Renal - GLEN - ischemic ATN - numbers a bit improved as of yesterday afternoon    (2)Hyperkalemia - much improved     (3)Metabolic acidosis - renally mediated    (4)ID - strep bacteremia - now on empiric broad-spectrum IV antibiotics      RECOMMEND:  (1)Can hold off until tomorrow for next bloodwork  (2)1/2NS+75meq/L NaHCO3; reduce from 75 to 50cc/h  (3)Reduce IV Lasix from 40q12h to 40qd  (4)D/C Lokelma  (5)Venofer 200mg iv qd as ordered  (6)Abx for GFR 20-30ml/min (present dosing is acceptable)                David Nixon MD  [facility]  Office: [phone]  Cell: [phone]       (+)fatigue, (+)HA      VITAL:  T(C): , Max: 36.7 (07-30-21 @ 21:23)  T(F): , Max: 98 (07-30-21 @ 21:23)  HR: 112 (07-31-21 @ 11:30)  BP: 138/76 (07-31-21 @ 11:30)  RR: 18 (07-31-21 @ 11:30)  SpO2: 97% (07-31-21 @ 11:30)      PHYSICAL EXAM:  Constitutional: lethargic but following simple commands/answering simple questions appropriately  HEENT: DMM, (+)NGT  Neck: Supple, No JVD  Respiratory: CTA-b/l  Cardiovascular: tachy reg s1s2  Gastrointestinal: BS+, soft, NT/ND  : diaz-cbi   Extremities: No peripheral edema b/l  Neurological: no focal deficits; strength grossly intact  Back: no CVAT b/l  Skin: No rashes, no nevi      LABS:                        4.1    6.88  )-----------( 203      ( 29 Jul 2021 15:56 )             13.6     Na(133)/K(3.9)/Cl(104)/HCO3(16)/BUN(42)/Cr(2.25)Glu(205)/Ca(8.8)/Mg(--)/PO4(--)    07-30 @ 14:19  Na(135)/K(5.4)/Cl(100)/HCO3(14)/BUN(35)/Cr(2.11)Glu(233)/Ca(9.9)/Mg(--)/PO4(--)    07-29 @ 16:14  Na(131)/K(4.7)/Cl(97)/HCO3(12)/BUN(33)/Cr(1.94)Glu(272)/Ca(9.7)/Mg(--)/PO4(--)    07-29 @ 14:52      IMPRESSION: 70F, Sikhism, with breast CA, 7/27/21 p/w vaginal bleeding/severe anemia    (1)Renal - GLEN - ischemic ATN - numbers a bit improved as of yesterday afternoon    (2)Hyperkalemia - much improved     (3)Metabolic acidosis - renally mediated    (4)ID - strep bacteremia - now on empiric broad-spectrum IV antibiotics      RECOMMEND:  (1)Can hold off until tomorrow for next bloodwork  (2)1/2NS+75meq/L NaHCO3; reduce from 75 to 50cc/h  (3)Reduce IV Lasix from 40q12h to 40qd  (4)D/C Lokelma  (5)Venofer 200mg iv qd as ordered  (6)Abx for GFR 20-30ml/min (present dosing is acceptable)                David Nixon MD  [facility]  Office: [phone]  Cell: [phone]

## 2021-07-31 NOTE — PROGRESS NOTE ADULT - SUBJECTIVE AND OBJECTIVE BOX
Subjective: Patient seen and examined. No new events except as noted.    little more responsive today, has hypernatremia, will start free water via NGT. has clots in CBI. rpt hgb3.6, ptn is hyperglycemic.  REVIEW OF SYSTEMS:  Unable to obtain     MEDICATIONS:  MEDICATIONS  (STANDING):  acetaminophen    Suspension .. 975 milliGRAM(s) Enteral Tube once  ampicillin  IVPB      ampicillin  IVPB 2 Gram(s) IV Intermittent every 12 hours  brimonidine 0.2% Ophthalmic Solution 1 Drop(s) Both EYES two times a day  cyanocobalamin Injectable 1000 MICROGram(s) IntraMuscular daily  dextrose 40% Gel 15 Gram(s) Oral once  dextrose 5% 1000 milliLiter(s) (50 mL/Hr) IV Continuous <Continuous>  dextrose 5%. 1000 milliLiter(s) (50 mL/Hr) IV Continuous <Continuous>  dextrose 5%. 1000 milliLiter(s) (100 mL/Hr) IV Continuous <Continuous>  dextrose 50% Injectable 25 Gram(s) IV Push once  dextrose 50% Injectable 12.5 Gram(s) IV Push once  dextrose 50% Injectable 25 Gram(s) IV Push once  folic acid 1 milliGRAM(s) Enteral Tube daily  furosemide   Injectable 40 milliGRAM(s) IV Push daily  glucagon  Injectable 1 milliGRAM(s) IntraMuscular once  insulin glargine Injectable (LANTUS) 10 Unit(s) SubCutaneous at bedtime  insulin lispro (ADMELOG) corrective regimen sliding scale   SubCutaneous every 6 hours  iron sucrose IVPB 200 milliGRAM(s) IV Intermittent every 24 hours  latanoprost 0.005% Ophthalmic Solution 1 Drop(s) Both EYES at bedtime  levETIRAcetam  IVPB 500 milliGRAM(s) IV Intermittent every 12 hours  midodrine. 10 milliGRAM(s) Oral three times a day  mirtazapine 15 milliGRAM(s) Oral at bedtime  pantoprazole  Injectable 40 milliGRAM(s) IV Push daily  venlafaxine 37.5 milliGRAM(s) Oral every 12 hours      PHYSICAL EXAM:  T(C): 36.3 (07-31-21 @ 22:05), Max: 36.8 (07-31-21 @ 20:56)  HR: 116 (07-31-21 @ 22:05) (112 - 118)  BP: 152/60 (07-31-21 @ 22:05) (138/76 - 172/81)  RR: 18 (07-31-21 @ 22:05) (18 - 19)  SpO2: 96% (07-31-21 @ 22:05) (96% - 99%)  Wt(kg): --  I&O's Summary    30 Jul 2021 07:01  -  31 Jul 2021 07:00  --------------------------------------------------------  IN: 15828 mL / OUT: 21243 mL / NET: -2390 mL    31 Jul 2021 07:01  -  31 Jul 2021 22:18  --------------------------------------------------------  IN: 6200 mL / OUT: 6900 mL / NET: -700 mL          Appearance: NAD +NGT   HEENT:   Dry  oral mucosa	  Lymphatic: No lymphadenopathy , no edema  Cardiovascular: Normal S1 S2, No JVD, No murmurs , Peripheral pulses palpable 2+ bilaterally  Respiratory: Decreased bs   Gastrointestinal:  Soft, Non-tender, + BS	  Skin: No rashes, No ecchymoses, No cyanosis, warm to touch  Musculoskeletal: Decreased range of motion and  strength  Psychiatry:  sleepy lethargic   Ext: No edema      LABS:    CARDIAC MARKERS:                                3.6    6.15  )-----------( 184      ( 31 Jul 2021 14:47 )             12.0     07-31    152<H>  |  114<H>  |  46<H>  ----------------------------<  274<H>  3.4<L>   |  21<L>  |  2.08<H>    Ca    9.3      31 Jul 2021 14:47    TPro  7.3  /  Alb  3.2<L>  /  TBili  0.2  /  DBili  x   /  AST  240<H>  /  ALT  247<H>  /  AlkPhos  165<H>  07-31    proBNP:   Lipid Profile:   HgA1c:   TSH:             TELEMETRY: 	  SR ST   ECG:  	  RADIOLOGY:   DIAGNOSTIC TESTING:  [ ] Echocardiogram:  [ ]  Catheterization:  [ ] Stress Test:    OTHER:

## 2021-07-31 NOTE — PROGRESS NOTE ADULT - ASSESSMENT
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, hx of metastatic breast cancer, anemia p/w hematuria, started on CBI, c/b RRT/ code stroke, presumed ischemic CVA    #Anemia, iron deficiency  - pt is JW, does not accept blood products- established by patient per chart review prior to AMS  - received EPO 40, 000 unit 7/28; will hold on giving higher dose therapy (3x/week) as blood sparing treatment due to acute CVA  - on IV iron 200mg daily- continue  - continue folic acid and IM B12  - LIMIT blood draws- no need for routine CBC; agree with Pediatric tubes  - repeat CBC is pending    #hematuria-  - US/ CT with no intrauterine source, with + bladder hematoma on CBI, was clamped- restarted due to clots  - prior R nephroureteral catheter with mild residual r hydronephrosis; with soft tissue mass surrounding distal right ureter  - Urology is following  - on CBI  - with clots, if Hg lower, would redose tranexamic acid at 10mg/kg dose- 500mg IV over 30 minutes; although there is increased risk of thrombosis, pt also with critically low Hg with bleeding    #Breast cancer, metastatic- now with widely metastatic disease  - recently established care at [facility], records in [facility] reviewed- initial dx 1996 R breast cancer s/p surgery, CMF, no endocrine therapy; had local recurrence, treated with endocrine agents  - developed Left breast cancer, s/p surgery, Aromasin  - malignant pleural effusion treated with femara/kisquali, followed by faslodex/ibrance; was following with Dr. 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 Barrett, d/w NP

## 2021-07-31 NOTE — PROGRESS NOTE ADULT - SUBJECTIVE AND OBJECTIVE BOX
Patient is a 70y old  Female who presents with a chief complaint of severe anemia, acute blood loss 2/2 hematuria (31 Jul 2021 13:04)      SUBJECTIVE / OVERNIGHT EVENTS: little more responsive today, has hypernatremia, will start free water via NGT. has clots in CBI. rpt hgb3.6, ptn is hyperglycemic. will call endo    MEDICATIONS  (STANDING):  acetaminophen    Suspension .. 975 milliGRAM(s) Oral once  ampicillin  IVPB      ampicillin  IVPB 2 Gram(s) IV Intermittent every 12 hours  brimonidine 0.2% Ophthalmic Solution 1 Drop(s) Both EYES two times a day  cyanocobalamin Injectable 1000 MICROGram(s) IntraMuscular daily  dextrose 40% Gel 15 Gram(s) Oral once  dextrose 5% 1000 milliLiter(s) (50 mL/Hr) IV Continuous <Continuous>  dextrose 5%. 1000 milliLiter(s) (50 mL/Hr) IV Continuous <Continuous>  dextrose 5%. 1000 milliLiter(s) (100 mL/Hr) IV Continuous <Continuous>  dextrose 50% Injectable 25 Gram(s) IV Push once  dextrose 50% Injectable 12.5 Gram(s) IV Push once  dextrose 50% Injectable 25 Gram(s) IV Push once  folic acid 1 milliGRAM(s) Enteral Tube daily  furosemide   Injectable 40 milliGRAM(s) IV Push daily  glucagon  Injectable 1 milliGRAM(s) IntraMuscular once  insulin glargine Injectable (LANTUS) 10 Unit(s) SubCutaneous at bedtime  insulin lispro (ADMELOG) corrective regimen sliding scale   SubCutaneous every 6 hours  iron sucrose IVPB 200 milliGRAM(s) IV Intermittent every 24 hours  latanoprost 0.005% Ophthalmic Solution 1 Drop(s) Both EYES at bedtime  levETIRAcetam  IVPB 500 milliGRAM(s) IV Intermittent every 12 hours  midodrine. 10 milliGRAM(s) Oral three times a day  mirtazapine 15 milliGRAM(s) Oral at bedtime  pantoprazole  Injectable 40 milliGRAM(s) IV Push daily  venlafaxine 37.5 milliGRAM(s) Oral every 12 hours    MEDICATIONS  (PRN):  acetaminophen   Tablet .. 650 milliGRAM(s) Oral every 6 hours PRN Temp greater or equal to 38C (100.4F), Mild Pain (1 - 3)  artificial  tears Solution 1 Drop(s) Both EYES four times a day PRN Dry Eyes  oxyCODONE    IR 5 milliGRAM(s) Oral every 4 hours PRN Moderate Pain (4 - 6)      Vital Signs Last 24 Hrs  T(F): 98.3 (07-31-21 @ 20:56), Max: 98.3 (07-31-21 @ 20:56)  HR: 114 (07-31-21 @ 20:56) (112 - 118)  BP: 172/81 (07-31-21 @ 20:56) (138/76 - 172/81)  RR: 19 (07-31-21 @ 20:56) (18 - 19)  SpO2: 96% (07-31-21 @ 20:56) (96% - 99%)  Telemetry:   CAPILLARY BLOOD GLUCOSE      POCT Blood Glucose.: 392 mg/dL (31 Jul 2021 21:34)  POCT Blood Glucose.: 360 mg/dL (31 Jul 2021 17:40)  POCT Blood Glucose.: 298 mg/dL (31 Jul 2021 11:19)  POCT Blood Glucose.: 241 mg/dL (31 Jul 2021 04:06)    I&O's Summary    30 Jul 2021 07:01  -  31 Jul 2021 07:00  --------------------------------------------------------  IN: 49866 mL / OUT: 20767 mL / NET: -2390 mL    31 Jul 2021 07:01  -  31 Jul 2021 22:04  --------------------------------------------------------  IN: 6200 mL / OUT: 6900 mL / NET: -700 mL        PHYSICAL EXAM:  GENERAL: NAD, well-developed  HEAD:  Atraumatic, Normocephalic  EYES: EOMI, PERRLA, conjunctiva and sclera clear  NECK: Supple, No JVD  CHEST/LUNG: Clear to auscultation bilaterally; No wheeze  HEART: Regular rate and rhythm; No murmurs, rubs, or gallops  ABDOMEN: Soft, Nontender, Nondistended; Bowel sounds present  EXTREMITIES:  2+ Peripheral Pulses, No clubbing, cyanosis, or edema  PSYCH: AAOx3  NEUROLOGY: non-focal  SKIN: No rashes or lesions    LABS:                        3.6    6.15  )-----------( 184      ( 31 Jul 2021 14:47 )             12.0     07-31    152<H>  |  114<H>  |  46<H>  ----------------------------<  274<H>  3.4<L>   |  21<L>  |  2.08<H>    Ca    9.3      31 Jul 2021 14:47    TPro  7.3  /  Alb  3.2<L>  /  TBili  0.2  /  DBili  x   /  AST  240<H>  /  ALT  247<H>  /  AlkPhos  165<H>  07-31                      Care Discussed with Consultants/Other Providers:

## 2021-07-31 NOTE — PROGRESS NOTE ADULT - SUBJECTIVE AND OBJECTIVE BOX
Chief Complaint: fu    History of Present Illness:  pt more awake and responsive; answering some questions, not able to give history; limited ROS- no f/c, no pain, no n/v/abd pain, CBI was restarted yesterday       MEDICATIONS  (STANDING):  ampicillin  IVPB      ampicillin  IVPB 2 Gram(s) IV Intermittent every 12 hours  brimonidine 0.2% Ophthalmic Solution 1 Drop(s) Both EYES two times a day  cyanocobalamin Injectable 1000 MICROGram(s) IntraMuscular daily  dextrose 40% Gel 15 Gram(s) Oral once  dextrose 5%. 1000 milliLiter(s) (50 mL/Hr) IV Continuous <Continuous>  dextrose 5%. 1000 milliLiter(s) (100 mL/Hr) IV Continuous <Continuous>  dextrose 50% Injectable 25 Gram(s) IV Push once  dextrose 50% Injectable 12.5 Gram(s) IV Push once  dextrose 50% Injectable 25 Gram(s) IV Push once  folic acid 1 milliGRAM(s) Enteral Tube daily  furosemide   Injectable 40 milliGRAM(s) IV Push every 12 hours  glucagon  Injectable 1 milliGRAM(s) IntraMuscular once  insulin glargine Injectable (LANTUS) 10 Unit(s) SubCutaneous at bedtime  insulin lispro (ADMELOG) corrective regimen sliding scale   SubCutaneous every 6 hours  iron sucrose IVPB 200 milliGRAM(s) IV Intermittent every 24 hours  latanoprost 0.005% Ophthalmic Solution 1 Drop(s) Both EYES at bedtime  levETIRAcetam  IVPB 500 milliGRAM(s) IV Intermittent every 12 hours  midodrine. 10 milliGRAM(s) Oral three times a day  mirtazapine 15 milliGRAM(s) Oral at bedtime  pantoprazole  Injectable 40 milliGRAM(s) IV Push daily  sodium bicarbonate  Infusion 0.117 mEq/kG/Hr (75 mL/Hr) IV Continuous <Continuous>  sodium zirconium cyclosilicate 10 Gram(s) Oral daily  venlafaxine 37.5 milliGRAM(s) Oral every 12 hours    MEDICATIONS  (PRN):  acetaminophen   Tablet .. 650 milliGRAM(s) Oral every 6 hours PRN Temp greater or equal to 38C (100.4F), Mild Pain (1 - 3)  artificial  tears Solution 1 Drop(s) Both EYES four times a day PRN Dry Eyes  oxyCODONE    IR 5 milliGRAM(s) Oral every 4 hours PRN Moderate Pain (4 - 6)      Allergies    No Known Allergies    Intolerances        Vital Signs Last 24 Hrs  T(C): 36.6 (31 Jul 2021 11:30), Max: 36.7 (30 Jul 2021 21:23)  T(F): 97.8 (31 Jul 2021 11:30), Max: 98 (30 Jul 2021 21:23)  HR: 112 (31 Jul 2021 11:30) (96 - 118)  BP: 138/76 (31 Jul 2021 11:30) (101/67 - 161/74)  BP(mean): --  RR: 18 (31 Jul 2021 11:30) (17 - 18)  SpO2: 97% (31 Jul 2021 11:30) (96% - 99%)    PHYSICAL EXAM  General: adult in NAD  HEENT: clear oropharynx, anicteric sclera, pink conjunctiva  Neck: supple  CV: normal S1/S2 with no murmur rubs or gallops  Lungs: clear to auscultation, no wheezes, no rales  Abdomen: soft non-tender non-distended, no hepatosplenomegaly, positive bowel sounds  Ext: no clubbing cyanosis or edema  Skin: no rashes and no petechiae  Lymph Nodes: No LAD in axillae, groin, neck  Neuro: alert and oriented X 3, no focal deficits    LABS:                          4.1    6.88  )-----------( 203      ( 29 Jul 2021 15:56 )             13.6         Mean Cell Volume : 111.5 fl  Mean Cell Hemoglobin : 33.6 pg  Mean Cell Hemoglobin Concentration : 30.1 gm/dL  Auto Neutrophil # : x  Auto Lymphocyte # : x  Auto Monocyte # : x  Auto Eosinophil # : x  Auto Basophil # : x  Auto Neutrophil % : x  Auto Lymphocyte % : x  Auto Monocyte % : x  Auto Eosinophil % : x  Auto Basophil % : x      Serial CBC's  07-29 @ 15:56  Hct-13.6 / Hgb-4.1 / Plat-203 / RBC-1.22 / WBC-6.88  Serial CBC's  07-28 @ 07:27  Hct-11.2 / Hgb-3.4 / Plat-122 / RBC-1.04 / WBC-3.08  Serial CBC's  07-27 @ 15:39  Hct-13.5 / Hgb-4.3 / Plat-147 / RBC-1.30 / WBC-3.22      07-30    133<L>  |  104  |  42<H>  ----------------------------<  205<H>  3.9   |  16<L>  |  2.25<H>    Ca    8.8      30 Jul 2021 14:19    TPro  8.0  /  Alb  3.5  /  TBili  0.3  /  DBili  x   /  AST  32  /  ALT  20  /  AlkPhos  178<H>  07-29      PT/INR - ( 29 Jul 2021 16:14 )   PT: 13.6 sec;   INR: 1.14 ratio         PTT - ( 29 Jul 2021 16:14 )  PTT:30.2 sec    Ferritin, Serum: 492 ng/mL (07-28 @ 09:02)              Radiology:         Chief Complaint: fu    History of Present Illness:  pt more awake and responsive from prior although remains lethargic; answering some questions, not able to give history; limited ROS- no f/c, no pain, no n/v/abd pain, CBI was restarted yesterday       MEDICATIONS  (STANDING):  ampicillin  IVPB      ampicillin  IVPB 2 Gram(s) IV Intermittent every 12 hours  brimonidine 0.2% Ophthalmic Solution 1 Drop(s) Both EYES two times a day  cyanocobalamin Injectable 1000 MICROGram(s) IntraMuscular daily  dextrose 40% Gel 15 Gram(s) Oral once  dextrose 5%. 1000 milliLiter(s) (50 mL/Hr) IV Continuous <Continuous>  dextrose 5%. 1000 milliLiter(s) (100 mL/Hr) IV Continuous <Continuous>  dextrose 50% Injectable 25 Gram(s) IV Push once  dextrose 50% Injectable 12.5 Gram(s) IV Push once  dextrose 50% Injectable 25 Gram(s) IV Push once  folic acid 1 milliGRAM(s) Enteral Tube daily  furosemide   Injectable 40 milliGRAM(s) IV Push every 12 hours  glucagon  Injectable 1 milliGRAM(s) IntraMuscular once  insulin glargine Injectable (LANTUS) 10 Unit(s) SubCutaneous at bedtime  insulin lispro (ADMELOG) corrective regimen sliding scale   SubCutaneous every 6 hours  iron sucrose IVPB 200 milliGRAM(s) IV Intermittent every 24 hours  latanoprost 0.005% Ophthalmic Solution 1 Drop(s) Both EYES at bedtime  levETIRAcetam  IVPB 500 milliGRAM(s) IV Intermittent every 12 hours  midodrine. 10 milliGRAM(s) Oral three times a day  mirtazapine 15 milliGRAM(s) Oral at bedtime  pantoprazole  Injectable 40 milliGRAM(s) IV Push daily  sodium bicarbonate  Infusion 0.117 mEq/kG/Hr (75 mL/Hr) IV Continuous <Continuous>  sodium zirconium cyclosilicate 10 Gram(s) Oral daily  venlafaxine 37.5 milliGRAM(s) Oral every 12 hours    MEDICATIONS  (PRN):  acetaminophen   Tablet .. 650 milliGRAM(s) Oral every 6 hours PRN Temp greater or equal to 38C (100.4F), Mild Pain (1 - 3)  artificial  tears Solution 1 Drop(s) Both EYES four times a day PRN Dry Eyes  oxyCODONE    IR 5 milliGRAM(s) Oral every 4 hours PRN Moderate Pain (4 - 6)      Allergies    No Known Allergies    Intolerances        Vital Signs Last 24 Hrs  T(C): 36.6 (31 Jul 2021 11:30), Max: 36.7 (30 Jul 2021 21:23)  T(F): 97.8 (31 Jul 2021 11:30), Max: 98 (30 Jul 2021 21:23)  HR: 112 (31 Jul 2021 11:30) (96 - 118)  BP: 138/76 (31 Jul 2021 11:30) (101/67 - 161/74)  BP(mean): --  RR: 18 (31 Jul 2021 11:30) (17 - 18)  SpO2: 97% (31 Jul 2021 11:30) (96% - 99%)    PHYSICAL EXAM  General: adult in NAD  HEENT: clear oropharynx, anicteric sclera, pink conjunctiva  Neck: supple  CV: normal S1/S2   Lungs: decreased BS, poor effort  Abdomen: soft non-tender non-distended, no hepatosplenomegaly, positive bowel sounds  Ext: no clubbing cyanosis or edema  Skin: no rashes and no petechiae  Lymph Nodes: No LAD in neck  Neuro: awake but lethargic    LABS:                          4.1    6.88  )-----------( 203      ( 29 Jul 2021 15:56 )             13.6         Mean Cell Volume : 111.5 fl  Mean Cell Hemoglobin : 33.6 pg  Mean Cell Hemoglobin Concentration : 30.1 gm/dL  Auto Neutrophil # : x  Auto Lymphocyte # : x  Auto Monocyte # : x  Auto Eosinophil # : x  Auto Basophil # : x  Auto Neutrophil % : x  Auto Lymphocyte % : x  Auto Monocyte % : x  Auto Eosinophil % : x  Auto Basophil % : x      Serial CBC's  07-29 @ 15:56  Hct-13.6 / Hgb-4.1 / Plat-203 / RBC-1.22 / WBC-6.88  Serial CBC's  07-28 @ 07:27  Hct-11.2 / Hgb-3.4 / Plat-122 / RBC-1.04 / WBC-3.08  Serial CBC's  07-27 @ 15:39  Hct-13.5 / Hgb-4.3 / Plat-147 / RBC-1.30 / WBC-3.22      07-30    133<L>  |  104  |  42<H>  ----------------------------<  205<H>  3.9   |  16<L>  |  2.25<H>    Ca    8.8      30 Jul 2021 14:19    TPro  8.0  /  Alb  3.5  /  TBili  0.3  /  DBili  x   /  AST  32  /  ALT  20  /  AlkPhos  178<H>  07-29      PT/INR - ( 29 Jul 2021 16:14 )   PT: 13.6 sec;   INR: 1.14 ratio         PTT - ( 29 Jul 2021 16:14 )  PTT:30.2 sec    Ferritin, Serum: 492 ng/mL (07-28 @ 09:02)              Radiology:

## 2021-07-31 NOTE — PROGRESS NOTE ADULT - ASSESSMENT
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 Recent admission to [facility] for the same, underwent EPO treatment and discharged. Reported Hgb of 5 during admission. Feeling generally weak, denies chest pain sob. Reports bright red blood in the toilet when she urinates. Denies dysuria, abdominal pain. Patient is unable to receive blood products of any kind.  has been tachycardic.

## 2021-08-01 LAB
-  AMPICILLIN: SIGNIFICANT CHANGE UP
-  GENTAMICIN SYNERGY: SIGNIFICANT CHANGE UP
-  VANCOMYCIN: SIGNIFICANT CHANGE UP
ANION GAP SERPL CALC-SCNC: 15 MMOL/L — SIGNIFICANT CHANGE UP (ref 5–17)
BUN SERPL-MCNC: 48 MG/DL — HIGH (ref 7–23)
CALCIUM SERPL-MCNC: 9.2 MG/DL — SIGNIFICANT CHANGE UP (ref 8.4–10.5)
CHLORIDE SERPL-SCNC: 117 MMOL/L — HIGH (ref 96–108)
CO2 SERPL-SCNC: 22 MMOL/L — SIGNIFICANT CHANGE UP (ref 22–31)
CREAT SERPL-MCNC: 1.85 MG/DL — HIGH (ref 0.5–1.3)
CULTURE RESULTS: SIGNIFICANT CHANGE UP
GLUCOSE BLDC GLUCOMTR-MCNC: 306 MG/DL — HIGH (ref 70–99)
GLUCOSE BLDC GLUCOMTR-MCNC: 354 MG/DL — HIGH (ref 70–99)
GLUCOSE BLDC GLUCOMTR-MCNC: 354 MG/DL — HIGH (ref 70–99)
GLUCOSE BLDC GLUCOMTR-MCNC: 384 MG/DL — HIGH (ref 70–99)
GLUCOSE BLDC GLUCOMTR-MCNC: 396 MG/DL — HIGH (ref 70–99)
GLUCOSE BLDC GLUCOMTR-MCNC: 428 MG/DL — HIGH (ref 70–99)
GLUCOSE BLDC GLUCOMTR-MCNC: 429 MG/DL — HIGH (ref 70–99)
GLUCOSE BLDC GLUCOMTR-MCNC: 449 MG/DL — HIGH (ref 70–99)
GLUCOSE BLDC GLUCOMTR-MCNC: 457 MG/DL — CRITICAL HIGH (ref 70–99)
GLUCOSE SERPL-MCNC: 245 MG/DL — HIGH (ref 70–99)
MAGNESIUM SERPL-MCNC: 2.4 MG/DL — SIGNIFICANT CHANGE UP (ref 1.6–2.6)
METHOD TYPE: SIGNIFICANT CHANGE UP
ORGANISM # SPEC MICROSCOPIC CNT: SIGNIFICANT CHANGE UP
PHOSPHATE SERPL-MCNC: 3.1 MG/DL — SIGNIFICANT CHANGE UP (ref 2.5–4.5)
POTASSIUM SERPL-MCNC: 3.6 MMOL/L — SIGNIFICANT CHANGE UP (ref 3.5–5.3)
POTASSIUM SERPL-SCNC: 3.6 MMOL/L — SIGNIFICANT CHANGE UP (ref 3.5–5.3)
SODIUM SERPL-SCNC: 154 MMOL/L — HIGH (ref 135–145)
SPECIMEN SOURCE: SIGNIFICANT CHANGE UP

## 2021-08-01 PROCEDURE — 99232 SBSQ HOSP IP/OBS MODERATE 35: CPT

## 2021-08-01 RX ORDER — INSULIN LISPRO 100/ML
6 VIAL (ML) SUBCUTANEOUS ONCE
Refills: 0 | Status: COMPLETED | OUTPATIENT
Start: 2021-08-01 | End: 2021-08-01

## 2021-08-01 RX ORDER — HYDRALAZINE HCL 50 MG
10 TABLET ORAL ONCE
Refills: 0 | Status: COMPLETED | OUTPATIENT
Start: 2021-08-01 | End: 2021-08-01

## 2021-08-01 RX ORDER — SODIUM CHLORIDE 9 MG/ML
1000 INJECTION, SOLUTION INTRAVENOUS
Refills: 0 | Status: DISCONTINUED | OUTPATIENT
Start: 2021-08-01 | End: 2021-08-01

## 2021-08-01 RX ORDER — INSULIN GLARGINE 100 [IU]/ML
20 INJECTION, SOLUTION SUBCUTANEOUS AT BEDTIME
Refills: 0 | Status: DISCONTINUED | OUTPATIENT
Start: 2021-08-01 | End: 2021-08-01

## 2021-08-01 RX ORDER — INSULIN LISPRO 100/ML
5 VIAL (ML) SUBCUTANEOUS ONCE
Refills: 0 | Status: COMPLETED | OUTPATIENT
Start: 2021-08-01 | End: 2021-08-01

## 2021-08-01 RX ORDER — INSULIN GLARGINE 100 [IU]/ML
10 INJECTION, SOLUTION SUBCUTANEOUS AT BEDTIME
Refills: 0 | Status: DISCONTINUED | OUTPATIENT
Start: 2021-08-01 | End: 2021-08-01

## 2021-08-01 RX ORDER — INSULIN LISPRO 100/ML
4 VIAL (ML) SUBCUTANEOUS EVERY 6 HOURS
Refills: 0 | Status: DISCONTINUED | OUTPATIENT
Start: 2021-08-01 | End: 2021-08-01

## 2021-08-01 RX ADMIN — Medication 5: at 11:38

## 2021-08-01 RX ADMIN — Medication 6 UNIT(S): at 17:15

## 2021-08-01 RX ADMIN — PANTOPRAZOLE SODIUM 40 MILLIGRAM(S): 20 TABLET, DELAYED RELEASE ORAL at 12:03

## 2021-08-01 RX ADMIN — Medication 216 GRAM(S): at 05:02

## 2021-08-01 RX ADMIN — Medication 40 MILLIGRAM(S): at 05:01

## 2021-08-01 RX ADMIN — Medication 216 GRAM(S): at 17:16

## 2021-08-01 RX ADMIN — MIRTAZAPINE 15 MILLIGRAM(S): 45 TABLET, ORALLY DISINTEGRATING ORAL at 21:03

## 2021-08-01 RX ADMIN — Medication 37.5 MILLIGRAM(S): at 17:17

## 2021-08-01 RX ADMIN — PREGABALIN 1000 MICROGRAM(S): 225 CAPSULE ORAL at 12:00

## 2021-08-01 RX ADMIN — Medication 37.5 MILLIGRAM(S): at 05:02

## 2021-08-01 RX ADMIN — Medication 6: at 17:16

## 2021-08-01 RX ADMIN — INSULIN GLARGINE 10 UNIT(S): 100 INJECTION, SOLUTION SUBCUTANEOUS at 21:48

## 2021-08-01 RX ADMIN — LEVETIRACETAM 400 MILLIGRAM(S): 250 TABLET, FILM COATED ORAL at 21:03

## 2021-08-01 RX ADMIN — IRON SUCROSE 110 MILLIGRAM(S): 20 INJECTION, SOLUTION INTRAVENOUS at 15:59

## 2021-08-01 RX ADMIN — LATANOPROST 1 DROP(S): 0.05 SOLUTION/ DROPS OPHTHALMIC; TOPICAL at 21:03

## 2021-08-01 RX ADMIN — Medication 5 UNIT(S): at 20:24

## 2021-08-01 RX ADMIN — Medication 10 MILLIGRAM(S): at 17:30

## 2021-08-01 RX ADMIN — Medication 4: at 23:21

## 2021-08-01 RX ADMIN — BRIMONIDINE TARTRATE 1 DROP(S): 2 SOLUTION/ DROPS OPHTHALMIC at 05:01

## 2021-08-01 RX ADMIN — BRIMONIDINE TARTRATE 1 DROP(S): 2 SOLUTION/ DROPS OPHTHALMIC at 17:17

## 2021-08-01 RX ADMIN — Medication 5: at 04:21

## 2021-08-01 RX ADMIN — Medication 1 MILLIGRAM(S): at 12:04

## 2021-08-01 RX ADMIN — LEVETIRACETAM 400 MILLIGRAM(S): 250 TABLET, FILM COATED ORAL at 09:51

## 2021-08-01 RX ADMIN — SODIUM CHLORIDE 60 MILLILITER(S): 9 INJECTION, SOLUTION INTRAVENOUS at 12:40

## 2021-08-01 RX ADMIN — Medication 5 MILLIGRAM(S): at 18:24

## 2021-08-01 NOTE — DIETITIAN INITIAL EVALUATION ADULT. - ADD RECOMMEND
EN recommendations as noted above; RD to remain available to adjust EN formulary, volume/rate PRN. Consider addition of bowel regimen - Pt without documented BM since 7/26. Monitor BG and adjust insulin regimen as appropriate; consider discontinuing D5 - defer to team

## 2021-08-01 NOTE — DIETITIAN NUTRITION RISK NOTIFICATION - ADDITIONAL COMMENTS/DIETITIAN RECOMMENDATIONS
Continue current EN regimen of Glucerna 1.2 @ 50ml/hr x 24hrs. Provides 1200ml formula, 1440kcals, 72g protein, 966ml free H2O (meets 30kcals/kg & 1.5g protein/kg based on dosing wt 48.1kg)  RD to remain available to adjust EN formulary, volume/rate PRN. Consider addition of bowel regimen - Pt without documented BM since 7/26. Monitor BG and adjust insulin regimen as appropriate; consider discontinuing D5 - defer to team

## 2021-08-01 NOTE — PROGRESS NOTE ADULT - SUBJECTIVE AND OBJECTIVE BOX
Subjective: Patient seen and examined. No new events except as noted.     REVIEW OF SYSTEMS:  Unable to obtain     MEDICATIONS:  MEDICATIONS  (STANDING):  ampicillin  IVPB      ampicillin  IVPB 2 Gram(s) IV Intermittent every 12 hours  brimonidine 0.2% Ophthalmic Solution 1 Drop(s) Both EYES two times a day  cyanocobalamin Injectable 1000 MICROGram(s) IntraMuscular daily  dextrose 40% Gel 15 Gram(s) Oral once  dextrose 5% 1000 milliLiter(s) (50 mL/Hr) IV Continuous <Continuous>  dextrose 5%. 1000 milliLiter(s) (50 mL/Hr) IV Continuous <Continuous>  dextrose 5%. 1000 milliLiter(s) (100 mL/Hr) IV Continuous <Continuous>  dextrose 50% Injectable 25 Gram(s) IV Push once  dextrose 50% Injectable 12.5 Gram(s) IV Push once  dextrose 50% Injectable 25 Gram(s) IV Push once  folic acid 1 milliGRAM(s) Enteral Tube daily  furosemide   Injectable 40 milliGRAM(s) IV Push daily  glucagon  Injectable 1 milliGRAM(s) IntraMuscular once  insulin glargine Injectable (LANTUS) 10 Unit(s) SubCutaneous at bedtime  insulin lispro (ADMELOG) corrective regimen sliding scale   SubCutaneous every 6 hours  iron sucrose IVPB 200 milliGRAM(s) IV Intermittent every 24 hours  latanoprost 0.005% Ophthalmic Solution 1 Drop(s) Both EYES at bedtime  levETIRAcetam  IVPB 500 milliGRAM(s) IV Intermittent every 12 hours  midodrine. 10 milliGRAM(s) Oral three times a day  mirtazapine 15 milliGRAM(s) Oral at bedtime  pantoprazole  Injectable 40 milliGRAM(s) IV Push daily  venlafaxine 37.5 milliGRAM(s) Oral every 12 hours      PHYSICAL EXAM:  T(C): 36.7 (08-01-21 @ 04:41), Max: 36.8 (07-31-21 @ 20:56)  HR: 111 (08-01-21 @ 04:41) (107 - 116)  BP: 132/76 (08-01-21 @ 05:40) (132/76 - 172/81)  RR: 18 (08-01-21 @ 04:41) (18 - 19)  SpO2: 98% (08-01-21 @ 04:41) (96% - 98%)  Wt(kg): --  I&O's Summary    31 Jul 2021 07:01  -  01 Aug 2021 07:00  --------------------------------------------------------  IN: 70354 mL / OUT: 44407 mL / NET: 2350 mL          Appearance: NAD +NGT   HEENT:   Dry  oral mucosa	  Lymphatic: No lymphadenopathy , no edema  Cardiovascular: Normal S1 S2, No JVD, No murmurs , Peripheral pulses palpable 2+ bilaterally  Respiratory: Decreased bs   Gastrointestinal:  Soft, Non-tender, + BS	  Skin: No rashes, No ecchymoses, No cyanosis, warm to touch  Musculoskeletal: Decreased range of motion and  strength  Psychiatry:  sleepy lethargic   Ext: No edema      LABS:    CARDIAC MARKERS:                                3.6    6.15  )-----------( 184      ( 31 Jul 2021 14:47 )             12.0     07-31    152<H>  |  114<H>  |  46<H>  ----------------------------<  274<H>  3.4<L>   |  21<L>  |  2.08<H>    Ca    9.3      31 Jul 2021 14:47    TPro  7.3  /  Alb  3.2<L>  /  TBili  0.2  /  DBili  x   /  AST  240<H>  /  ALT  247<H>  /  AlkPhos  165<H>  07-31    proBNP:   Lipid Profile:   HgA1c:   TSH:             TELEMETRY: 	SR ST     ECG:  	  RADIOLOGY:   DIAGNOSTIC TESTING:  [ ] Echocardiogram:  [ ]  Catheterization:  [ ] Stress Test:    OTHER:

## 2021-08-01 NOTE — DIETITIAN INITIAL EVALUATION ADULT. - PERTINENT MEDS FT
MEDICATIONS  (STANDING):  ampicillin  IVPB  ampicillin  IVPB  cyanocobalamin Injectable  dextrose 40% Gel  dextrose 5%  dextrose 5%.  dextrose 5%.  dextrose 50% Injectable  dextrose 50% Injectable  dextrose 50% Injectable  folic acid  furosemide   Injectable  glucagon  Injectable  insulin glargine Injectable (LANTUS)  insulin lispro (ADMELOG) corrective regimen sliding scale  iron sucrose IVPB  midodrine.  pantoprazole  Injectable

## 2021-08-01 NOTE — PROGRESS NOTE ADULT - ASSESSMENT
69 yo F [employer], hx of metastatic breast cancer, anemia p/w hematuria, started on CBI, c/b RRT/ code stroke, presumed ischemic CVA    #Anemia, iron deficiency  - pt is JW, does not accept blood products- established by patient per chart review prior to AMS  - received EPO 40, 000 unit 7/28; will hold on giving higher dose therapy (3x/week) as blood sparing treatment due to acute CVA  - on IV iron 200mg daily- continue  - continue folic acid and IM B12  - LIMIT blood draws- no need for routine CBC; agree with Pediatric tubes  - Hg 3.6 on 7/31    #hematuria-  - US/ CT with no intrauterine source, with + bladder hematoma on CBI, was clamped- restarted due to clots  - prior R nephroureteral catheter with mild residual r hydronephrosis; with soft tissue mass surrounding distal right ureter  - Urology is following  - on CBI intermittently  - was redosed tranexamic acid at 10mg/kg dose- 500mg IV over 30 minutes on 7/31 with critically low Hg with bleeding, despite risk of thrombosis  - urine mostly clear now, monitor    #Breast cancer, metastatic- now with widely metastatic disease  - recently established care at [facility], records in [facility] reviewed- initial dx 1996 R breast cancer s/p surgery, CMF, no endocrine therapy; had local recurrence, treated with endocrine agents  - developed Left breast cancer, s/p surgery, Aromasin  - malignant pleural effusion treated with femara/kisquali, followed by faslodex/ibrance; was following with Dr. 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

## 2021-08-01 NOTE — DIETITIAN INITIAL EVALUATION ADULT. - ORAL INTAKE PTA/DIET HISTORY
Unable to obtain subjective information from pt at this time; pt with AMS, noted lethargic/disoriented/confused Per chart, NKFA, no micronutrient supplementation PTA. Unknown chewing/swallowing function PTA at this time.

## 2021-08-01 NOTE — DIETITIAN INITIAL EVALUATION ADULT. - OTHER INFO
Pt was on Regular consistency PO diet upon admission 7/27 until discontinued on 7/30 with EN ordered. Currently ordered for Glucerna 1.2 via NGT with goal rate of 50ml/hr x 24hrs (1200ml formula, 1440kcals, 72g protein, 966ml free H2O)+ additional 250ml free water 4x/day    GI: last documented BM noted 7/26 - no bowel regimen ordered at this time.     No documented weight Hx indicated in chart/[facility] growth chart  Stated dosing weight 7/26: 106Ibs; Bedscale weight 7/30: 116.4Ibs  - Noted pt on diuretic therapies at this time    No documented Hx of Pre-Dm/DM, however, H&P indicates Janumet & Humalog medication management PTA. A1c 6.5% (7/28) - indicative of DM status. pt currently hyperglycemic w/ FS ranging from 298-392mg/dL in the last 24hrs; receiving Insulin Sliding Scale & Lantus in-house. To note, pt also ordered for D5. Pt was on Regular consistency PO diet upon admission 7/27 until discontinued on 7/30 with EN ordered. Currently ordered for Glucerna 1.2 via NGT with goal rate of 50ml/hr x 24hrs (1200ml formula, 1440kcals, 72g protein, 966ml free H2O)+ additional 250ml free water 4x/day.  * Current regimen meets 30kcals/kg & 1.5g protein/kg based on dosing wt 48.1kg*  - EN infusing at goal rate of 50ml/hr - PCA notes pt has been tolerating EN without GI distress.    GI: last documented BM noted 7/26; PCA endorsed pt has not had BM while he has been following her - no bowel regimen ordered at this time.     No documented weight Hx indicated in chart/[facility] growth chart  Stated dosing weight 7/26: 106Ibs; Bedscale weight 7/30: 116.4Ibs  - Noted pt on diuretic therapies at this time    No documented Hx of Pre-Dm/DM, however, H&P indicates Janumet & Humalog medication management PTA. A1c 6.5% (7/28) - indicative of DM status. pt currently hyperglycemic w/ FS ranging from 298-392mg/dL in the last 24hrs; receiving Insulin Sliding Scale & Lantus in-house. To note, pt also ordered for D5.

## 2021-08-01 NOTE — CHART NOTE - NSCHARTNOTEFT_GEN_A_CORE
SIMA WILL    Notified by RN patient's blood sugars are ranging from 300-400 mg/dl. Pt is on continuous tube feeds.   Is presently on low scale insulin coverage.       Interventions taken:  Discussed findings with Medical Attending.   Will start Lantus 20 units qhs.   Admelog 4 units qid.   Will call [facility] to consult.   Continue to monitor blood sugars closely.                            Thu Maldonado [facility] #[id number]

## 2021-08-01 NOTE — DIETITIAN INITIAL EVALUATION ADULT. - ETIOLOGY
related to inadequate protein-energy intake with increased physiological demand in setting of breast Ca on active chemo

## 2021-08-01 NOTE — PROGRESS NOTE ADULT - ASSESSMENT
69 yo Female Jehovah witness with PMHx of breast CA s/p BL mastectomy with recurrence and metastasis here with c/o vaginal bleeding, found to have bladder hematoma on pelvic ultrasound. Pt also with R sided nephU with mild hydro, but pt unsure indication for nephU.   on admission: H/H 4.3/13.5, SCr 1.37.  Pt follows up at [facility] and is currently receiving chemotherapy.     7/28: Hct 11.2 from 13.5 yesterday. ptn is pale, lethargic, dyspneic, expresses wishes not to have any blood products based on her GD DOV. ptn placed her proxy on the phone Mr. Gray who wanted to confirm the hospital is respecting ptn's wishes. both ptn and the proxy aksed baout being transferred to [facility]. however ptn is not stable w HGB of 3 to be travelling. denies CP, palps, HA, has ongoing hematuria, CBI in place. Ct C/A/P reviewed. ptn w metastatic dz process on CT scan. Heme consult called. will cont CBI as per . pending CT urogram. cont trending HH. started on IV IRON. prognosis is guarded.   7/29: ptn found unresponsive with a droop, fever, ongoing hematuria, had filled out a MOLST form , she is DNR/DNI, code stroke called, will give Abx, keep temp down w cooling blanket, since ptn cannot receive anticoagulation/is dnr/dni will not get CTA of brain, will place on KEPPRA for sz, get eeg, prognosis is grave. this was d/w ,Mr Gray ptn's proxy. MICU consult reviewed. prognosis is grave  7/30:   ptn is morwe responsive today though minimally, has enterococcal bacteremia. seen by ID , placed on IV AMPICILLIN while awaiting sensitivities. rpt blood cx sent. will check HH in am. all blood draws should be done in pediatric [facility] . GLEN, ongoing 2/2 ischemic ATN, hematuria improving  7/31:  little more responsive today, has hypernatremia, will start free water via NGT. has clots in CBI. rpt hgb3.6, ptn is hyperglycemic. will call endo  8/1: ptn is lethargic, hematuria has improved, now off  CBI, ongoing GLEN and hypernatremia though improved, heme, renal  following. Hyperglycemia, will dc d5W drip, place on standing LANTUS , cont Admelog on a sliding scale, Dr. Guthrie from [facility] called. increase free water intake to 250 q4H per NGT. rpt cbc and BMP in am via pediatric tubes. tranexamic acid, EPO, IV iron, folic acid and B12 as per heme

## 2021-08-01 NOTE — PROGRESS NOTE ADULT - SUBJECTIVE AND OBJECTIVE BOX
INFECTIOUS DISEASES FOLLOW UP--Eugene Alfaro MD  Pager [phone]    This is a follow up note for this  70y Female with  e. faecalis bsi and uti.  lethargic.    Further ROS:  unable    Allergies  No Known Allergies    ANTIBIOTICS/RELEVANT:  antimicrobials  ampicillin  IVPB      ampicillin  IVPB 2 Gram(s) IV Intermittent every 12 hours    OTHER:  acetaminophen   Tablet .. 650 milliGRAM(s) Oral every 6 hours PRN  artificial  tears Solution 1 Drop(s) Both EYES four times a day PRN  brimonidine 0.2% Ophthalmic Solution 1 Drop(s) Both EYES two times a day  cyanocobalamin Injectable 1000 MICROGram(s) IntraMuscular daily  dextrose 40% Gel 15 Gram(s) Oral once  dextrose 5% 1000 milliLiter(s) IV Continuous <Continuous>  dextrose 5%. 1000 milliLiter(s) IV Continuous <Continuous>  dextrose 5%. 1000 milliLiter(s) IV Continuous <Continuous>  dextrose 50% Injectable 25 Gram(s) IV Push once  dextrose 50% Injectable 12.5 Gram(s) IV Push once  dextrose 50% Injectable 25 Gram(s) IV Push once  folic acid 1 milliGRAM(s) Enteral Tube daily  furosemide   Injectable 40 milliGRAM(s) IV Push daily  glucagon  Injectable 1 milliGRAM(s) IntraMuscular once  insulin glargine Injectable (LANTUS) 10 Unit(s) SubCutaneous at bedtime  insulin lispro (ADMELOG) corrective regimen sliding scale   SubCutaneous every 6 hours  iron sucrose IVPB 200 milliGRAM(s) IV Intermittent every 24 hours  latanoprost 0.005% Ophthalmic Solution 1 Drop(s) Both EYES at bedtime  levETIRAcetam  IVPB 500 milliGRAM(s) IV Intermittent every 12 hours  midodrine. 10 milliGRAM(s) Oral three times a day  mirtazapine 15 milliGRAM(s) Oral at bedtime  oxyCODONE    IR 5 milliGRAM(s) Oral every 4 hours PRN  pantoprazole  Injectable 40 milliGRAM(s) IV Push daily  venlafaxine 37.5 milliGRAM(s) Oral every 12 hours    Objective:  Vital Signs Last 24 Hrs  T(C): 36.7 (01 Aug 2021 04:41), Max: 36.8 (31 Jul 2021 20:56)  T(F): 98 (01 Aug 2021 04:41), Max: 98.3 (31 Jul 2021 20:56)  HR: 111 (01 Aug 2021 04:41) (107 - 116)  BP: 132/76 (01 Aug 2021 05:40) (132/76 - 172/81)  BP(mean): --  RR: 18 (01 Aug 2021 04:41) (18 - 19)  SpO2: 98% (01 Aug 2021 04:41) (96% - 98%)    PHYSICAL EXAM:  Constitutional:no acute distress  Ear/Nose/Throat: no oral lesions, 	  Respiratory: clear BL ant  Cardiovascular: S1S2  Gastrointestinal:soft, (+) BS, no tenderness  Extremities:no e/e/c  No Lymphadenopathy  IV sites not inflammed.    LABS:                        3.6    6.15  )-----------( 184      ( 31 Jul 2021 14:47 )             12.0     07-31    152<H>  |  114<H>  |  46<H>  ----------------------------<  274<H>  3.4<L>   |  21<L>  |  2.08<H>    Ca    9.3      31 Jul 2021 14:47    TPro  7.3  /  Alb  3.2<L>  /  TBili  0.2  /  DBili  x   /  AST  240<H>  /  ALT  247<H>  /  AlkPhos  165<H>  07-31    MICROBIOLOGY: repeat bc pending    RADIOLOGY & ADDITIONAL STUDIES:  no new data

## 2021-08-01 NOTE — DIETITIAN NUTRITION RISK NOTIFICATION - TREATMENT: THE FOLLOWING DIET HAS BEEN RECOMMENDED
Diet, NPO with Tube Feed:   Tube Feeding Modality: Nasogastric  Glucerna 1.2 Mike (GLUCERNARTH)  Total Volume for 24 Hours (mL): 1200  Continuous  Starting Tube Feed Rate {mL per Hour}: 10  Increase Tube Feed Rate by (mL): 10     Every 6 hours  Until Goal Tube Feed Rate (mL per Hour): 50  Tube Feed Duration (in Hours): 24  Tube Feed Start Time: 16:00 (07-30-21 @ 16:48) [Active]

## 2021-08-01 NOTE — PROGRESS NOTE ADULT - SUBJECTIVE AND OBJECTIVE BOX
Chief Complaint: fu    History of Present Illness: pt awake, lethargic, minimally conversive; no f/c, no cp, no n/v/abd pain, CBI has been on intermittently- currently mostly clear with some clots; no other bleeding      MEDICATIONS  (STANDING):  ampicillin  IVPB      ampicillin  IVPB 2 Gram(s) IV Intermittent every 12 hours  brimonidine 0.2% Ophthalmic Solution 1 Drop(s) Both EYES two times a day  cyanocobalamin Injectable 1000 MICROGram(s) IntraMuscular daily  dextrose 40% Gel 15 Gram(s) Oral once  dextrose 5%. 1000 milliLiter(s) (60 mL/Hr) IV Continuous <Continuous>  dextrose 5%. 1000 milliLiter(s) (50 mL/Hr) IV Continuous <Continuous>  dextrose 5%. 1000 milliLiter(s) (100 mL/Hr) IV Continuous <Continuous>  dextrose 50% Injectable 25 Gram(s) IV Push once  dextrose 50% Injectable 12.5 Gram(s) IV Push once  dextrose 50% Injectable 25 Gram(s) IV Push once  folic acid 1 milliGRAM(s) Enteral Tube daily  glucagon  Injectable 1 milliGRAM(s) IntraMuscular once  insulin glargine Injectable (LANTUS) 10 Unit(s) SubCutaneous at bedtime  insulin lispro (ADMELOG) corrective regimen sliding scale   SubCutaneous every 6 hours  iron sucrose IVPB 200 milliGRAM(s) IV Intermittent every 24 hours  latanoprost 0.005% Ophthalmic Solution 1 Drop(s) Both EYES at bedtime  levETIRAcetam  IVPB 500 milliGRAM(s) IV Intermittent every 12 hours  midodrine. 10 milliGRAM(s) Oral three times a day  mirtazapine 15 milliGRAM(s) Oral at bedtime  pantoprazole  Injectable 40 milliGRAM(s) IV Push daily  venlafaxine 37.5 milliGRAM(s) Oral every 12 hours    MEDICATIONS  (PRN):  acetaminophen   Tablet .. 650 milliGRAM(s) Oral every 6 hours PRN Temp greater or equal to 38C (100.4F), Mild Pain (1 - 3)  artificial  tears Solution 1 Drop(s) Both EYES four times a day PRN Dry Eyes  oxyCODONE    IR 5 milliGRAM(s) Oral every 4 hours PRN Moderate Pain (4 - 6)      Allergies    No Known Allergies    Intolerances        Vital Signs Last 24 Hrs  T(C): 36.7 (01 Aug 2021 04:41), Max: 36.8 (31 Jul 2021 20:56)  T(F): 98 (01 Aug 2021 04:41), Max: 98.3 (31 Jul 2021 20:56)  HR: 108 (01 Aug 2021 11:45) (107 - 116)  BP: 149/75 (01 Aug 2021 11:45) (132/76 - 172/81)  BP(mean): --  RR: 18 (01 Aug 2021 04:41) (18 - 19)  SpO2: 98% (01 Aug 2021 04:41) (96% - 98%)    PHYSICAL EXAM  General: adult in NAD  HEENT: NGT  Neck: supple  CV: normal S1/S2 with no murmur rubs or gallops  Lungs: decreased BS, poor effort  Abdomen: soft non-tender non-distended, positive bowel sounds  Ext: no clubbing cyanosis or edema  Skin: no rashes and no petechiae  Lymph Nodes: No LAD in neck  Neuro: lethargic    LABS:                          3.6    6.15  )-----------( 184      ( 31 Jul 2021 14:47 )             12.0         Mean Cell Volume : 113.2 fl  Mean Cell Hemoglobin : 34.0 pg  Mean Cell Hemoglobin Concentration : 30.0 gm/dL  Auto Neutrophil # : x  Auto Lymphocyte # : x  Auto Monocyte # : x  Auto Eosinophil # : x  Auto Basophil # : x  Auto Neutrophil % : x  Auto Lymphocyte % : x  Auto Monocyte % : x  Auto Eosinophil % : x  Auto Basophil % : x      Serial CBC's  07-31 @ 14:47  Hct-12.0 / Hgb-3.6 / Plat-184 / RBC-1.06 / WBC-6.15  Serial CBC's  07-29 @ 15:56  Hct-13.6 / Hgb-4.1 / Plat-203 / RBC-1.22 / WBC-6.88      08-01    154<H>  |  117<H>  |  48<H>  ----------------------------<  245<H>  3.6   |  22  |  1.85<H>    Ca    9.2      01 Aug 2021 08:59  Phos  3.1     08-01  Mg     2.4     08-01    TPro  7.3  /  Alb  3.2<L>  /  TBili  0.2  /  DBili  x   /  AST  240<H>  /  ALT  247<H>  /  AlkPhos  165<H>  07-31          Ferritin, Serum: 492 ng/mL (07-28 @ 09:02)              Radiology:

## 2021-08-01 NOTE — PROGRESS NOTE ADULT - ASSESSMENT
Imp/rx:  Enterococcus UTI and bacteremia.  No acute distress.  tolerating ampicillin.    continue ampicillin.  f/u repeat bc from yesterday.

## 2021-08-01 NOTE — PROGRESS NOTE ADULT - SUBJECTIVE AND OBJECTIVE BOX
Patient is a 70y old  Female who presents with a chief complaint of severe anemia, acute blood loss 2/2 hematuria (01 Aug 2021 12:18)      SUBJECTIVE / OVERNIGHT EVENTS: ptn is lethargic, hematuria has improved, now off  CBI, ongoing GLEN and hypernatremia though improved, heme, renal  following. Hyperglycemia, will dc d5W drip, place on standing LANTUS , cont Admelog on a sliding scale, Dr. Guthrie from [facility] called. increase free water intake to 250 q4H per NGT. rpt cbc and BMP in am via pediatric tubes. tranexamic acid, EPO, IV iron, folic acid and B12 as per heme    MEDICATIONS  (STANDING):  ampicillin  IVPB      ampicillin  IVPB 2 Gram(s) IV Intermittent every 12 hours  brimonidine 0.2% Ophthalmic Solution 1 Drop(s) Both EYES two times a day  cyanocobalamin Injectable 1000 MICROGram(s) IntraMuscular daily  dextrose 40% Gel 15 Gram(s) Oral once  dextrose 5%. 1000 milliLiter(s) (60 mL/Hr) IV Continuous <Continuous>  dextrose 5%. 1000 milliLiter(s) (50 mL/Hr) IV Continuous <Continuous>  dextrose 5%. 1000 milliLiter(s) (100 mL/Hr) IV Continuous <Continuous>  dextrose 50% Injectable 25 Gram(s) IV Push once  dextrose 50% Injectable 12.5 Gram(s) IV Push once  dextrose 50% Injectable 25 Gram(s) IV Push once  folic acid 1 milliGRAM(s) Enteral Tube daily  glucagon  Injectable 1 milliGRAM(s) IntraMuscular once  insulin glargine Injectable (LANTUS) 20 Unit(s) SubCutaneous at bedtime  insulin glargine Injectable (LANTUS) 10 Unit(s) SubCutaneous at bedtime  insulin lispro (ADMELOG) corrective regimen sliding scale   SubCutaneous every 6 hours  insulin lispro Injectable (ADMELOG) 4 Unit(s) SubCutaneous every 6 hours  insulin lispro Injectable (ADMELOG). 5 Unit(s) SubCutaneous once  latanoprost 0.005% Ophthalmic Solution 1 Drop(s) Both EYES at bedtime  levETIRAcetam  IVPB 500 milliGRAM(s) IV Intermittent every 12 hours  midodrine. 10 milliGRAM(s) Oral three times a day  mirtazapine 15 milliGRAM(s) Oral at bedtime  pantoprazole  Injectable 40 milliGRAM(s) IV Push daily  venlafaxine 37.5 milliGRAM(s) Oral every 12 hours    MEDICATIONS  (PRN):  acetaminophen   Tablet .. 650 milliGRAM(s) Oral every 6 hours PRN Temp greater or equal to 38C (100.4F), Mild Pain (1 - 3)  artificial  tears Solution 1 Drop(s) Both EYES four times a day PRN Dry Eyes  oxyCODONE    IR 5 milliGRAM(s) Oral every 4 hours PRN Moderate Pain (4 - 6)      Vital Signs Last 24 Hrs  T(F): 98 (08-01-21 @ 04:41), Max: 98.3 (07-31-21 @ 20:56)  HR: 115 (08-01-21 @ 17:55) (107 - 116)  BP: 155/73 (08-01-21 @ 17:55) (132/76 - 172/81)  RR: 18 (08-01-21 @ 04:41) (18 - 19)  SpO2: 98% (08-01-21 @ 04:41) (96% - 98%)  Telemetry:   CAPILLARY BLOOD GLUCOSE      POCT Blood Glucose.: 396 mg/dL (01 Aug 2021 20:01)  POCT Blood Glucose.: 429 mg/dL (01 Aug 2021 18:32)  POCT Blood Glucose.: 428 mg/dL (01 Aug 2021 18:31)  POCT Blood Glucose.: 457 mg/dL (01 Aug 2021 17:07)  POCT Blood Glucose.: 449 mg/dL (01 Aug 2021 17:06)  POCT Blood Glucose.: 354 mg/dL (01 Aug 2021 11:12)  POCT Blood Glucose.: 384 mg/dL (01 Aug 2021 04:11)  POCT Blood Glucose.: 392 mg/dL (31 Jul 2021 21:34)    I&O's Summary    31 Jul 2021 07:01  -  01 Aug 2021 07:00  --------------------------------------------------------  IN: 14805 mL / OUT: 61738 mL / NET: 2350 mL    01 Aug 2021 07:01  -  01 Aug 2021 20:20  --------------------------------------------------------  IN: 0 mL / OUT: 1100 mL / NET: -1100 mL        PHYSICAL EXAM:  GENERAL: NAD, well-developed  HEAD:  Atraumatic, Normocephalic  EYES: EOMI, PERRLA, conjunctiva and sclera clear  NECK: Supple, No JVD  CHEST/LUNG: Clear to auscultation bilaterally; No wheeze  HEART: Regular rate and rhythm; No murmurs, rubs, or gallops  ABDOMEN: Soft, Nontender, Nondistended; Bowel sounds present  EXTREMITIES:  2+ Peripheral Pulses, No clubbing, cyanosis, or edema  PSYCH: AAOx3  NEUROLOGY: non-focal  SKIN: No rashes or lesions    LABS:                        3.6    6.15  )-----------( 184      ( 31 Jul 2021 14:47 )             12.0     08-01    154<H>  |  117<H>  |  48<H>  ----------------------------<  245<H>  3.6   |  22  |  1.85<H>    Ca    9.2      01 Aug 2021 08:59  Phos  3.1     08-01  Mg     2.4     08-01    TPro  7.3  /  Alb  3.2<L>  /  TBili  0.2  /  DBili  x   /  AST  240<H>  /  ALT  247<H>  /  AlkPhos  165<H>  07-31              RADIOLOGY & ADDITIONAL TESTS:    Imaging Personally Reviewed:    Consultant(s) Notes Reviewed:      Care Discussed with Consultants/Other Providers:

## 2021-08-01 NOTE — PROGRESS NOTE ADULT - ASSESSMENT
69 yo F [place], hx of breast Ca on active chemo (unsure of name) and anemia p/w reported vaginal bleeding. Recent admission to [facility] for the same, underwent EPO treatment and discharged. Reported Hgb of 5 during admission. Feeling generally weak, denies chest pain sob. Reports bright red blood in the toilet when she urinates. Denies dysuria, abdominal pain. Patient is unable to receive blood products of any kind.  has been tachycardic.

## 2021-08-01 NOTE — DIETITIAN INITIAL EVALUATION ADULT. - PERTINENT LABORATORY DATA
08-01 Na154 mmol/L<H> Glu 245 mg/dL<H> K+ 3.6 mmol/L Cr  1.85 mg/dL<H> BUN 48 mg/dL<H> Phos 3.1 mg/dL Alb n/a   PAB n/a

## 2021-08-01 NOTE — PROGRESS NOTE ADULT - SUBJECTIVE AND OBJECTIVE BOX
Overnight events noted      VITAL:  T(C): , Max: 36.8 (07-31-21 @ 20:56)  T(F): , Max: 98.3 (07-31-21 @ 20:56)  HR: 108 (08-01-21 @ 11:45)  BP: 149/75 (08-01-21 @ 11:45)  BP(mean): --  RR: 18 (08-01-21 @ 04:41)  SpO2: 98% (08-01-21 @ 04:41)      PHYSICAL EXAM:  Constitutional: lethargic but following simple commands/answering simple questions appropriately  HEENT: DMM, (+)NGT  Neck: Supple, No JVD  Respiratory: CTA-b/l  Cardiovascular: tachy reg s1s2  Gastrointestinal: BS+, soft, NT/ND  : diaz-cbi   Extremities: No peripheral edema b/l  Neurological: no focal deficits; strength grossly intact  Back: no CVAT b/l  Skin: No rashes, no nevi    LABS:                        3.6    6.15  )-----------( 184      ( 31 Jul 2021 14:47 )             12.0     Na(154)/K(3.6)/Cl(117)/HCO3(22)/BUN(48)/Cr(1.85)Glu(245)/Ca(9.2)/Mg(2.4)/PO4(3.1)    08-01 @ 08:59  Na(152)/K(3.4)/Cl(114)/HCO3(21)/BUN(46)/Cr(2.08)Glu(274)/Ca(9.3)/Mg(--)/PO4(--)    07-31 @ 14:47  Na(133)/K(3.9)/Cl(104)/HCO3(16)/BUN(42)/Cr(2.25)Glu(205)/Ca(8.8)/Mg(--)/PO4(--)    07-30 @ 14:19  Na(135)/K(5.4)/Cl(100)/HCO3(14)/BUN(35)/Cr(2.11)Glu(233)/Ca(9.9)/Mg(--)/PO4(--)    07-29 @ 16:14  Na(131)/K(4.7)/Cl(97)/HCO3(12)/BUN(33)/Cr(1.94)Glu(272)/Ca(9.7)/Mg(--)/PO4(--)    07-29 @ 14:52      IMPRESSION: 70F, [employer], with breast CA, 7/27/21 p/w vaginal bleeding/severe anemia    (1)Renal - GLEN - ischemic ATN - starting to resolve - GFR now ~20-30ml/min    (2)Hypernatremia - worsening    (3)Metabolic acidosis - much improved, with IV NaHCO3    (4)ID - enterococcal bacteremia - now on IV Ampicillin      RECOMMEND:  (1)Change IVF from D5W+50meq/L NaHCO3@50cc/h ==> D5W@60cc/h  (2)Free water 250cc qid via NGT as ordered  (3)D/C Lasix  (4)Dose new meds for GFR 20-30ml/min (present dosing is acceptable)  (5)BMP daily            David Nixon MD  [facility]  Office: [phone]  Cell: [phone]       no pain, no sob    VITAL:  T(C): , Max: 36.8 (07-31-21 @ 20:56)  T(F): , Max: 98.3 (07-31-21 @ 20:56)  HR: 108 (08-01-21 @ 11:45)  BP: 149/75 (08-01-21 @ 11:45)  BP(mean): --  RR: 18 (08-01-21 @ 04:41)  SpO2: 98% (08-01-21 @ 04:41)      PHYSICAL EXAM:  Constitutional: lethargic/mildly confused  HEENT: DMM, (+)NGT  Neck: Supple, No JVD  Respiratory: CTA-b/l  Cardiovascular: tachy reg s1s2  Gastrointestinal: BS+, soft, NT/ND  : diaz-cbi   Extremities: No peripheral edema b/l  Neurological: no focal deficits; strength grossly intact  Back: no CVAT b/l  Skin: No rashes, no nevi    LABS:                        3.6    6.15  )-----------( 184      ( 31 Jul 2021 14:47 )             12.0     Na(154)/K(3.6)/Cl(117)/HCO3(22)/BUN(48)/Cr(1.85)Glu(245)/Ca(9.2)/Mg(2.4)/PO4(3.1)    08-01 @ 08:59  Na(152)/K(3.4)/Cl(114)/HCO3(21)/BUN(46)/Cr(2.08)Glu(274)/Ca(9.3)/Mg(--)/PO4(--)    07-31 @ 14:47  Na(133)/K(3.9)/Cl(104)/HCO3(16)/BUN(42)/Cr(2.25)Glu(205)/Ca(8.8)/Mg(--)/PO4(--)    07-30 @ 14:19  Na(135)/K(5.4)/Cl(100)/HCO3(14)/BUN(35)/Cr(2.11)Glu(233)/Ca(9.9)/Mg(--)/PO4(--)    07-29 @ 16:14  Na(131)/K(4.7)/Cl(97)/HCO3(12)/BUN(33)/Cr(1.94)Glu(272)/Ca(9.7)/Mg(--)/PO4(--)    07-29 @ 14:52      IMPRESSION: 70F, [employer], with breast CA, 7/27/21 p/w vaginal bleeding/severe anemia    (1)Renal - GLEN - ischemic ATN - starting to resolve - GFR now ~20-30ml/min    (2)Hypernatremia - worsening    (3)Metabolic acidosis - much improved, with IV NaHCO3    (4)ID - enterococcal bacteremia - now on IV Ampicillin      RECOMMEND:  (1)Change IVF from D5W+50meq/L NaHCO3@50cc/h ==> D5W@60cc/h  (2)Free water 250cc qid via NGT as ordered  (3)D/C Lasix  (4)Dose new meds for GFR 20-30ml/min (present dosing is acceptable)  (5)BMP daily            David Nixon MD  [facility]  Office: [phone]  Cell: [phone]

## 2021-08-01 NOTE — DIETITIAN INITIAL EVALUATION ADULT. - ENTERAL
Continue current EN regimen of Glucerna 1.2 @ 50ml/hr x 24hrs. Provides 1200ml formula, 1440kcals, 72g protein, 966ml free H2O (meets 30kcals/kg & 1.5g protein/kg based on dosing wt 48.1kg)

## 2021-08-01 NOTE — DIETITIAN INITIAL EVALUATION ADULT. - REASON INDICATOR FOR ASSESSMENT
Nutrition consult warranted for Tube Feeding  Source: EMR, Team; Pt noted lethargic & disoriented/confused

## 2021-08-01 NOTE — DIETITIAN INITIAL EVALUATION ADULT. - CHIEF COMPLAINT
70 F [place], PMH of breast Ca on active chemo s/p BL mastectomy with recurrence and metastasis (osseous, liver) and anemia p/w reported vaginal bleeding. Hospital course c/b code stroke (7/29), Enterococcus UTI and bacteremia.

## 2021-08-02 DIAGNOSIS — E11.65 TYPE 2 DIABETES MELLITUS WITH HYPERGLYCEMIA: ICD-10-CM

## 2021-08-02 DIAGNOSIS — E78.5 HYPERLIPIDEMIA, UNSPECIFIED: ICD-10-CM

## 2021-08-02 DIAGNOSIS — I10 ESSENTIAL (PRIMARY) HYPERTENSION: ICD-10-CM

## 2021-08-02 LAB
GLUCOSE BLDC GLUCOMTR-MCNC: 300 MG/DL — HIGH (ref 70–99)
GLUCOSE BLDC GLUCOMTR-MCNC: 364 MG/DL — HIGH (ref 70–99)
GLUCOSE BLDC GLUCOMTR-MCNC: 370 MG/DL — HIGH (ref 70–99)
GLUCOSE BLDC GLUCOMTR-MCNC: 415 MG/DL — HIGH (ref 70–99)

## 2021-08-02 PROCEDURE — 99232 SBSQ HOSP IP/OBS MODERATE 35: CPT

## 2021-08-02 PROCEDURE — 99223 1ST HOSP IP/OBS HIGH 75: CPT

## 2021-08-02 RX ORDER — HUMAN INSULIN 100 [IU]/ML
5 INJECTION, SUSPENSION SUBCUTANEOUS ONCE
Refills: 0 | Status: COMPLETED | OUTPATIENT
Start: 2021-08-02 | End: 2021-08-02

## 2021-08-02 RX ORDER — SODIUM CHLORIDE 9 MG/ML
1000 INJECTION, SOLUTION INTRAVENOUS
Refills: 0 | Status: DISCONTINUED | OUTPATIENT
Start: 2021-08-02 | End: 2021-08-02

## 2021-08-02 RX ORDER — HUMAN INSULIN 100 [IU]/ML
8 INJECTION, SUSPENSION SUBCUTANEOUS EVERY 6 HOURS
Refills: 0 | Status: DISCONTINUED | OUTPATIENT
Start: 2021-08-03 | End: 2021-08-03

## 2021-08-02 RX ORDER — IRON SUCROSE 20 MG/ML
200 INJECTION, SOLUTION INTRAVENOUS EVERY 24 HOURS
Refills: 0 | Status: COMPLETED | OUTPATIENT
Start: 2021-08-02 | End: 2021-08-04

## 2021-08-02 RX ADMIN — Medication 5: at 12:42

## 2021-08-02 RX ADMIN — IRON SUCROSE 110 MILLIGRAM(S): 20 INJECTION, SOLUTION INTRAVENOUS at 12:33

## 2021-08-02 RX ADMIN — LEVETIRACETAM 400 MILLIGRAM(S): 250 TABLET, FILM COATED ORAL at 22:19

## 2021-08-02 RX ADMIN — Medication 216 GRAM(S): at 17:57

## 2021-08-02 RX ADMIN — Medication 6: at 17:53

## 2021-08-02 RX ADMIN — Medication 37.5 MILLIGRAM(S): at 17:59

## 2021-08-02 RX ADMIN — MIRTAZAPINE 15 MILLIGRAM(S): 45 TABLET, ORALLY DISINTEGRATING ORAL at 22:19

## 2021-08-02 RX ADMIN — SODIUM CHLORIDE 60 MILLILITER(S): 9 INJECTION, SOLUTION INTRAVENOUS at 14:33

## 2021-08-02 RX ADMIN — BRIMONIDINE TARTRATE 1 DROP(S): 2 SOLUTION/ DROPS OPHTHALMIC at 17:57

## 2021-08-02 RX ADMIN — Medication 216 GRAM(S): at 05:47

## 2021-08-02 RX ADMIN — Medication 5: at 23:28

## 2021-08-02 RX ADMIN — PREGABALIN 1000 MICROGRAM(S): 225 CAPSULE ORAL at 12:21

## 2021-08-02 RX ADMIN — PANTOPRAZOLE SODIUM 40 MILLIGRAM(S): 20 TABLET, DELAYED RELEASE ORAL at 12:18

## 2021-08-02 RX ADMIN — BRIMONIDINE TARTRATE 1 DROP(S): 2 SOLUTION/ DROPS OPHTHALMIC at 05:47

## 2021-08-02 RX ADMIN — Medication 1 MILLIGRAM(S): at 12:21

## 2021-08-02 RX ADMIN — Medication 3: at 05:16

## 2021-08-02 RX ADMIN — HUMAN INSULIN 5 UNIT(S): 100 INJECTION, SUSPENSION SUBCUTANEOUS at 17:56

## 2021-08-02 RX ADMIN — Medication 37.5 MILLIGRAM(S): at 06:19

## 2021-08-02 RX ADMIN — HUMAN INSULIN 8 UNIT(S): 100 INJECTION, SUSPENSION SUBCUTANEOUS at 23:29

## 2021-08-02 RX ADMIN — LEVETIRACETAM 400 MILLIGRAM(S): 250 TABLET, FILM COATED ORAL at 12:16

## 2021-08-02 RX ADMIN — LATANOPROST 1 DROP(S): 0.05 SOLUTION/ DROPS OPHTHALMIC; TOPICAL at 22:19

## 2021-08-02 NOTE — PROGRESS NOTE ADULT - SUBJECTIVE AND OBJECTIVE BOX
Subjective: Patient seen and examined. No new events except as noted.     REVIEW OF SYSTEMS:  Unable to obtain       MEDICATIONS:  MEDICATIONS  (STANDING):  ampicillin  IVPB      ampicillin  IVPB 2 Gram(s) IV Intermittent every 12 hours  brimonidine 0.2% Ophthalmic Solution 1 Drop(s) Both EYES two times a day  cyanocobalamin Injectable 1000 MICROGram(s) IntraMuscular daily  dextrose 40% Gel 15 Gram(s) Oral once  dextrose 5%. 1000 milliLiter(s) (50 mL/Hr) IV Continuous <Continuous>  dextrose 5%. 1000 milliLiter(s) (100 mL/Hr) IV Continuous <Continuous>  dextrose 50% Injectable 25 Gram(s) IV Push once  dextrose 50% Injectable 12.5 Gram(s) IV Push once  dextrose 50% Injectable 25 Gram(s) IV Push once  folic acid 1 milliGRAM(s) Enteral Tube daily  glucagon  Injectable 1 milliGRAM(s) IntraMuscular once  insulin glargine Injectable (LANTUS) 10 Unit(s) SubCutaneous at bedtime  insulin lispro (ADMELOG) corrective regimen sliding scale   SubCutaneous every 6 hours  latanoprost 0.005% Ophthalmic Solution 1 Drop(s) Both EYES at bedtime  levETIRAcetam  IVPB 500 milliGRAM(s) IV Intermittent every 12 hours  midodrine. 10 milliGRAM(s) Oral three times a day  mirtazapine 15 milliGRAM(s) Oral at bedtime  pantoprazole  Injectable 40 milliGRAM(s) IV Push daily  venlafaxine 37.5 milliGRAM(s) Oral every 12 hours      PHYSICAL EXAM:  T(C): 36.7 (08-02-21 @ 04:57), Max: 36.7 (08-02-21 @ 04:57)  HR: 115 (08-02-21 @ 04:57) (108 - 116)  BP: 150/72 (08-02-21 @ 04:57) (149/75 - 172/78)  RR: 18 (08-02-21 @ 04:57) (18 - 18)  SpO2: 100% (08-02-21 @ 04:57) (100% - 100%)  Wt(kg): --  I&O's Summary    01 Aug 2021 07:01  -  02 Aug 2021 07:00  --------------------------------------------------------  IN: 1600 mL / OUT: 3150 mL / NET: -1550 mL                Appearance: NAD +NGT   HEENT:   Dry  oral mucosa	  Lymphatic: No lymphadenopathy , no edema  Cardiovascular: Normal S1 S2, No JVD, No murmurs , Peripheral pulses palpable 2+ bilaterally  Respiratory: Decreased bs   Gastrointestinal:  Soft, Non-tender, + BS	  Skin: No rashes, No ecchymoses, No cyanosis, warm to touch  Musculoskeletal: Decreased range of motion and  strength  Psychiatry:  sleepy lethargic   Ext: No edema          LABS:    CARDIAC MARKERS:                                3.6    6.15  )-----------( 184      ( 31 Jul 2021 14:47 )             12.0     08-01    154<H>  |  117<H>  |  48<H>  ----------------------------<  245<H>  3.6   |  22  |  1.85<H>    Ca    9.2      01 Aug 2021 08:59  Phos  3.1     08-01  Mg     2.4     08-01    TPro  7.3  /  Alb  3.2<L>  /  TBili  0.2  /  DBili  x   /  AST  240<H>  /  ALT  247<H>  /  AlkPhos  165<H>  07-31    proBNP:   Lipid Profile:   HgA1c:   TSH:             TELEMETRY: SR ST 	    ECG:  	  RADIOLOGY:   DIAGNOSTIC TESTING:  [ ] Echocardiogram:  [ ]  Catheterization:  [ ] Stress Test:    OTHER:

## 2021-08-02 NOTE — PROGRESS NOTE ADULT - ASSESSMENT
Patient with metastatic breast cancer  Now presenting with severe anemia  Found to have CT A/P with nonenhancing debris in the bladder (clot) and soft tissue lesion around the distal right ureter and evidence of likely metastatic neoplasm.     UCx with Enterococcus faecalis  BCx with Enterococcus faecalis    Continue  Ampicillin for treatment of her bacteremia   repeat BCx NGTD  Would check TTE to exclude vegetations

## 2021-08-02 NOTE — PROGRESS NOTE ADULT - SUBJECTIVE AND OBJECTIVE BOX
Overnight events noted      VITAL:  T(C): , Max: 36.7 (08-02-21 @ 04:57)  T(F): , Max: 98.1 (08-02-21 @ 04:57)  HR: 113 (08-02-21 @ 11:53)  BP: 149/76 (08-02-21 @ 11:53)  BP(mean): --  RR: 18 (08-02-21 @ 11:53)  SpO2: 98% (08-02-21 @ 11:53)      PHYSICAL EXAM:  Constitutional: lethargic/mildly confused  HEENT: DMM, (+)NGT  Neck: Supple, No JVD  Respiratory: CTA-b/l  Cardiovascular: tachy reg s1s2  Gastrointestinal: BS+, soft, NT/ND  : diaz-cbi   Extremities: No peripheral edema b/l  Neurological: no focal deficits; strength grossly intact  Back: no CVAT b/l  Skin: No rashes, no nevi    LABS:                        3.6    6.15  )-----------( 184      ( 31 Jul 2021 14:47 )             12.0     Na(154)/K(3.6)/Cl(117)/HCO3(22)/BUN(48)/Cr(1.85)Glu(245)/Ca(9.2)/Mg(2.4)/PO4(3.1)    08-01 @ 08:59  Na(152)/K(3.4)/Cl(114)/HCO3(21)/BUN(46)/Cr(2.08)Glu(274)/Ca(9.3)/Mg(--)/PO4(--)    07-31 @ 14:47  Na(133)/K(3.9)/Cl(104)/HCO3(16)/BUN(42)/Cr(2.25)Glu(205)/Ca(8.8)/Mg(--)/PO4(--)    07-30 @ 14:19      IMPRESSION: 70F, [employer], with breast CA, 7/27/21 p/w vaginal bleeding/severe anemia    (1)Renal - GLEN - ischemic ATN - slowly resolving     (2)Hypernatremia - worsening as of yesterday; no labs at present for today. Off D5W IV in setting of her hyperglycemia    (3)ID - enterococcal bacteremia - on IV Ampicillin      RECOMMEND:  (1)Free water 250cc qid via NGT as ordered  (2)Add 1/2NS@60cc/h for now  (3)Dose new meds for GFR 20-30ml/min (present dosing is acceptable)  (4)BMP daily            David Nixon MD  [facility]  Office: [phone]  Cell: [phone]       No complaints      VITAL:  T(C): , Max: 36.7 (08-02-21 @ 04:57)  T(F): , Max: 98.1 (08-02-21 @ 04:57)  HR: 113 (08-02-21 @ 11:53)  BP: 149/76 (08-02-21 @ 11:53)  RR: 18 (08-02-21 @ 11:53)  SpO2: 98% (08-02-21 @ 11:53)      PHYSICAL EXAM:  Constitutional: lethargic/mildly confused  HEENT: DMM, (+)NGT  Neck: Supple, No JVD  Respiratory: CTA-b/l  Cardiovascular: tachy reg s1s2  Gastrointestinal: BS+, soft, NT/ND  : diaz-cbi   Extremities: No peripheral edema b/l  Neurological: no focal deficits; strength grossly intact  Back: no CVAT b/l  Skin: No rashes, no nevi    LABS:                        3.6    6.15  )-----------( 184      ( 31 Jul 2021 14:47 )             12.0     Na(154)/K(3.6)/Cl(117)/HCO3(22)/BUN(48)/Cr(1.85)Glu(245)/Ca(9.2)/Mg(2.4)/PO4(3.1)    08-01 @ 08:59  Na(152)/K(3.4)/Cl(114)/HCO3(21)/BUN(46)/Cr(2.08)Glu(274)/Ca(9.3)/Mg(--)/PO4(--)    07-31 @ 14:47  Na(133)/K(3.9)/Cl(104)/HCO3(16)/BUN(42)/Cr(2.25)Glu(205)/Ca(8.8)/Mg(--)/PO4(--)    07-30 @ 14:19      IMPRESSION: 70F, [employer], with breast CA, 7/27/21 p/w vaginal bleeding/severe anemia    (1)Renal - GLEN - ischemic ATN - slowly resolving     (2)Hypernatremia - worsening as of yesterday; no labs at present for today. Off D5W IV in setting of her hyperglycemia    (3)ID - enterococcal bacteremia - on IV Ampicillin      RECOMMEND:  (1)Free water 250cc qid via NGT as ordered  (2)Add 1/2NS@60cc/h for now  (3)Dose new meds for GFR 20-30ml/min (present dosing is acceptable)  (4)BMP daily            David Nixon MD  [facility]  Office: [phone]  Cell: [phone]

## 2021-08-02 NOTE — CHART NOTE - NSCHARTNOTEFT_GEN_A_CORE
Pt alert oriented Pt alert oriented x 2-3 refusing blood draws   Pt well aware of  consequences including death , of not allowing us to draw her blood , she is Hypernatremic , on Free water boluses   palliative care consult requested for [facility] referral  Salvatore Marroquin NP-C [id number]

## 2021-08-02 NOTE — PROGRESS NOTE ADULT - SUBJECTIVE AND OBJECTIVE BOX
Chief Complaint: fu    History of Present Illness: pt awake, more responsive, oriented to person only; no f/c, no cp/dyspnea/cough, +NGT, no n/v/abd pain, CBI clamped- urine is dark pink, no clots, no other bleeding; pt refusing bloodwork      MEDICATIONS  (STANDING):  ampicillin  IVPB      ampicillin  IVPB 2 Gram(s) IV Intermittent every 12 hours  brimonidine 0.2% Ophthalmic Solution 1 Drop(s) Both EYES two times a day  cyanocobalamin Injectable 1000 MICROGram(s) IntraMuscular daily  dextrose 40% Gel 15 Gram(s) Oral once  dextrose 5%. 1000 milliLiter(s) (50 mL/Hr) IV Continuous <Continuous>  dextrose 5%. 1000 milliLiter(s) (100 mL/Hr) IV Continuous <Continuous>  dextrose 50% Injectable 25 Gram(s) IV Push once  dextrose 50% Injectable 12.5 Gram(s) IV Push once  dextrose 50% Injectable 25 Gram(s) IV Push once  folic acid 1 milliGRAM(s) Enteral Tube daily  glucagon  Injectable 1 milliGRAM(s) IntraMuscular once  insulin glargine Injectable (LANTUS) 10 Unit(s) SubCutaneous at bedtime  insulin lispro (ADMELOG) corrective regimen sliding scale   SubCutaneous every 6 hours  latanoprost 0.005% Ophthalmic Solution 1 Drop(s) Both EYES at bedtime  levETIRAcetam  IVPB 500 milliGRAM(s) IV Intermittent every 12 hours  midodrine. 10 milliGRAM(s) Oral three times a day  mirtazapine 15 milliGRAM(s) Oral at bedtime  pantoprazole  Injectable 40 milliGRAM(s) IV Push daily  venlafaxine 37.5 milliGRAM(s) Oral every 12 hours    MEDICATIONS  (PRN):  acetaminophen   Tablet .. 650 milliGRAM(s) Oral every 6 hours PRN Temp greater or equal to 38C (100.4F), Mild Pain (1 - 3)  artificial  tears Solution 1 Drop(s) Both EYES four times a day PRN Dry Eyes  oxyCODONE    IR 5 milliGRAM(s) Oral every 4 hours PRN Moderate Pain (4 - 6)      Allergies    No Known Allergies    Intolerances        Vital Signs Last 24 Hrs  T(C): 36.7 (02 Aug 2021 04:57), Max: 36.7 (02 Aug 2021 04:57)  T(F): 98.1 (02 Aug 2021 04:57), Max: 98.1 (02 Aug 2021 04:57)  HR: 115 (02 Aug 2021 04:57) (108 - 116)  BP: 150/72 (02 Aug 2021 04:57) (149/75 - 172/78)  BP(mean): --  RR: 18 (02 Aug 2021 04:57) (18 - 18)  SpO2: 100% (02 Aug 2021 04:57) (100% - 100%)    PHYSICAL EXAM  General: adult in NAD  HEENT: NGT  Neck: supple  CV: normal S1/S2 with no murmur rubs or gallops  Lungs: clear to auscultation, no wheezes, no rales  Abdomen: soft non-tender non-distended,  positive bowel sounds  Ext: no clubbing cyanosis or edema  Skin: no rashes and no petechiae  Lymph Nodes: No LAD in  neck  Neuro: oriented to person    LABS:                          3.6    6.15  )-----------( 184      ( 31 Jul 2021 14:47 )             12.0         Mean Cell Volume : 113.2 fl  Mean Cell Hemoglobin : 34.0 pg  Mean Cell Hemoglobin Concentration : 30.0 gm/dL  Auto Neutrophil # : x  Auto Lymphocyte # : x  Auto Monocyte # : x  Auto Eosinophil # : x  Auto Basophil # : x  Auto Neutrophil % : x  Auto Lymphocyte % : x  Auto Monocyte % : x  Auto Eosinophil % : x  Auto Basophil % : x      Serial CBC's  07-31 @ 14:47  Hct-12.0 / Hgb-3.6 / Plat-184 / RBC-1.06 / WBC-6.15  Serial CBC's  07-29 @ 15:56  Hct-13.6 / Hgb-4.1 / Plat-203 / RBC-1.22 / WBC-6.88      08-01    154<H>  |  117<H>  |  48<H>  ----------------------------<  245<H>  3.6   |  22  |  1.85<H>    Ca    9.2      01 Aug 2021 08:59  Phos  3.1     08-01  Mg     2.4     08-01    TPro  7.3  /  Alb  3.2<L>  /  TBili  0.2  /  DBili  x   /  AST  240<H>  /  ALT  247<H>  /  AlkPhos  165<H>  07-31          Ferritin, Serum: 492 ng/mL (07-28 @ 09:02)              Radiology:

## 2021-08-02 NOTE — CONSULT NOTE ADULT - ASSESSMENT
70 year old woman, [employer], hx of breast Ca on active chemo (unsure of name) and anemia, DM2, HTN, HLD, p/w reported vaginal bleeding. Consult called for management of hyperglycemia to 400's to 500's in patient on continuous 24 hour tube feeds. HbA1c 6.5%. BG goal 100-180 mg/dL.

## 2021-08-02 NOTE — PROGRESS NOTE ADULT - ASSESSMENT
71 yo Female Jehovah witness with PMHx of breast CA s/p BL mastectomy with recurrence and metastasis here with c/o vaginal bleeding, found to have bladder hematoma on pelvic ultrasound. Pt also with R sided nephU with mild hydro, but pt unsure indication for nephU.   on admission: H/H 4.3/13.5, SCr 1.37.  Pt follows up at [facility] and is currently receiving chemotherapy.     7/28: Hct 11.2 from 13.5 yesterday. ptn is pale, lethargic, dyspneic, expresses wishes not to have any blood products based on her GD DOV. ptn placed her proxy on the phone Mr. Gray who wanted to confirm the hospital is respecting ptn's wishes. both ptn and the proxy aksed baout being transferred to [facility]. however ptn is not stable w HGB of 3 to be travelling. denies CP, palps, HA, has ongoing hematuria, CBI in place. Ct C/A/P reviewed. ptn w metastatic dz process on CT scan. Heme consult called. will cont CBI as per . pending CT urogram. cont trending HH. started on IV IRON. prognosis is guarded.   7/29: ptn found unresponsive with a droop, fever, ongoing hematuria, had filled out a MOLST form , she is DNR/DNI, code stroke called, will give Abx, keep temp down w cooling blanket, since ptn cannot receive anticoagulation/is dnr/dni will not get CTA of brain, will place on KEPPRA for sz, get eeg, prognosis is grave. this was d/w ,Mr Gray ptn's proxy. MICU consult reviewed. prognosis is grave  7/30:   ptn is morwe responsive today though minimally, has enterococcal bacteremia. seen by ID , placed on IV AMPICILLIN while awaiting sensitivities. rpt blood cx sent. will check HH in am. all blood draws should be done in pediatric [facility] . GLEN, ongoing 2/2 ischemic ATN, hematuria improving  7/31:  little more responsive today, has hypernatremia, will start free water via NGT. has clots in CBI. rpt hgb3.6, ptn is hyperglycemic. will call endo  8/1: ptn is lethargic, hematuria has improved, now off  CBI, ongoing GLEN and hypernatremia though improved, heme, renal  following. Hyperglycemia, will dc d5W drip, place on standing LANTUS , cont Admelog on a sliding scale, Dr. Guthrie from [facility] called. increase free water intake to 250 q4H per NGT. rpt cbc and BMP in am via pediatric tubes. tranexamic acid, EPO, IV iron, folic acid and B12 as per heme  8/2: awake, alert, responds appropriately. on and off confusion, diaz in place w hematuria, no clots. off CBI.  ptn refused Blood work today. on NGT feeds w free water per NGT, hyperglycemia is ongoing, not controlled, endo on board. will DC IVF

## 2021-08-02 NOTE — PROGRESS NOTE ADULT - ASSESSMENT
69 yo F Sikh, hx of metastatic breast cancer, anemia p/w hematuria, started on CBI, c/b RRT/ code stroke, presumed ischemic CVA    #Anemia, iron deficiency  - pt is LUCI, does not accept blood products- established by patient per chart review prior to AMS  - received EPO 40, 000 unit 7/28; will hold on giving higher dose therapy (3x/week) as blood sparing treatment due to acute CVA  - s/p IV iron 200mg daily x 5, will continue for 3 further doses  - continue folic acid and IM B12  - LIMIT blood draws- no need for routine CBC; agree with Pediatric tubes  - Hg 3.6 on 7/31; pt refusing bloodwork today    #hematuria-  - US/ CT with no intrauterine source, with + bladder hematoma on CBI, was clamped- restarted due to clots  - prior R nephroureteral catheter with mild residual r hydronephrosis; with soft tissue mass surrounding distal right ureter  - Urology is following  - on CBI intermittently  - was redosed tranexamic acid at 10mg/kg dose- 500mg IV over 30 minutes on 7/31 with critically low Hg with bleeding, despite risk of thrombosis  - urine darker, no clots, continue to monitor    #Breast cancer, metastatic- now with widely metastatic disease  - recently established care at [facility], records in [facility] reviewed- initial dx 1996 R breast cancer s/p surgery, CMF, no endocrine therapy; had local recurrence, treated with endocrine agents  - developed Left breast cancer, s/p surgery, Aromasin  - malignant pleural effusion treated with femara/kisquali, followed by faslodex/ibrance; was following with Dr. 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 Consider Palliative consult for further goals of care.  d/w NP

## 2021-08-02 NOTE — PROGRESS NOTE ADULT - SUBJECTIVE AND OBJECTIVE BOX
Follow Up:  bacteremia    Interval History/ROS:  weak, denies pain    Allergies  No Known Allergies    ANTIMICROBIALS:  ampicillin  IVPB    ampicillin  IVPB 2 every 12 hours      OTHER MEDS:  MEDICATIONS  (STANDING):  acetaminophen   Tablet .. 650 every 6 hours PRN  dextrose 40% Gel 15 once  dextrose 50% Injectable 25 once  dextrose 50% Injectable 12.5 once  dextrose 50% Injectable 25 once  glucagon  Injectable 1 once  insulin lispro (ADMELOG) corrective regimen sliding scale  every 6 hours  levETIRAcetam  IVPB 500 every 12 hours  mirtazapine 15 at bedtime  oxyCODONE    IR 5 every 4 hours PRN  pantoprazole  Injectable 40 daily  venlafaxine 37.5 every 12 hours      Vital Signs Last 24 Hrs  T(C): 36.9 (02 Aug 2021 20:30), Max: 36.9 (02 Aug 2021 20:30)  T(F): 98.4 (02 Aug 2021 20:30), Max: 98.4 (02 Aug 2021 20:30)  HR: 108 (02 Aug 2021 20:30) (108 - 116)  BP: 147/71 (02 Aug 2021 20:30) (147/71 - 158/73)  BP(mean): --  RR: 18 (02 Aug 2021 20:30) (18 - 18)  SpO2: 98% (02 Aug 2021 20:30) (98% - 100%)    PHYSICAL EXAM:  General: ill appearing, NAD, Non-toxic  Neurology: A&Ox3, nonfocal  Respiratory: Clear to auscultation bilaterally  CV: RRR, S1S2, no murmurs, rubs or gallops  Abdominal: Soft, Non-tender, non-distended, normal bowel sounds  Extremities: No edema,  Line Sites: Clear  Skin: No rash    WBC Count: 6.15 (07-31 @ 14:47)  WBC Count: 6.88 (07-29 @ 15:56)      08-01    154<H>  |  117<H>  |  48<H>  ----------------------------<  245<H>  3.6   |  22  |  1.85<H>    Ca    9.2      01 Aug 2021 08:59  Phos  3.1     08-01  Mg     2.4     08-01        MICROBIOLOGY:  .Blood Blood-Peripheral  07-31-21   No growth to date.  --  --      .Blood Blood-Peripheral  07-29-21   Growth in aerobic and anaerobic bottles: Enterococcus faecalis    Catheterized Catheterized  07-28-21   >100,000 CFU/ml Enterococcus faecalis  --  Enterococcus faecalis          RADIOLOGY:    William Mckenzie MD; Division of Infectious Disease; Pager: [phone]; nights and weekends: [phone]

## 2021-08-02 NOTE — CONSULT NOTE ADULT - SUBJECTIVE AND OBJECTIVE BOX
HPI:  Patient is a 70 year old woman, Adventism, hx of breast Ca on active chemo (unsure of name) and anemia, DM2, HTN, HLD, p/w reported vaginal bleeding. Consult called for management of hyperglycemia to 400's to 500's in patient on continuous 24 hour tube feeds. HbA1c 6.5%. Per chart, patient is on Janumet  BID and Humalog as needed before meals. Patient seen at bedside, history from her limited as she is only AAO x 1-2. She does report history of DM2 and thinks she was taking Janumet at home, as well as insulin, but unclear which insulin and what dose. States that she wasn't eating at home and her BG went down, but unable to tell me what her BG numbers were. She does have sx of neuropathy in the left foot. Has CKD stage 3b-4. No known retinopathy, reports some blurry vision in her right eye for which she has been given eye drops for. On continuous 24 hour tube feeds at this time, which are at goal rate of 50 cc/hr.       PAST MEDICAL & SURGICAL HISTORY:  Anemia  DM2  HTN  HLD    No significant past surgical history      FAMILY HISTORY:  unable to obtain    Social History:  no known toxic habits    Outpatient Medications:  · 	MiraLax oral powder for reconstitution: Last Dose Taken:  , 17 gram(s) orally once a day, As Needed  · 	brimonidine 0.2% ophthalmic solution: Last Dose Taken:  , 1 drop(s) in each eye 2 times a day  · 	Lumigan 0.01% ophthalmic solution: Last Dose Taken:  , 1 drop(s) in each eye once a day (at bedtime)  · 	Refresh ophthalmic solution: Last Dose Taken:  , 1 drop(s) in each eye 4 times a day, As Needed  · 	midodrine 10 mg oral tablet: Last Dose Taken:  , 1 tab(s) orally 3 times a day  · 	mirtazapine 15 mg oral tablet: Last Dose Taken:  , 1 tab(s) orally once a day (at bedtime)  · 	venlafaxine 75 mg oral capsule, extended release: Last Dose Taken:  , 3 cap(s) orally once a day  · 	Janumet 50 mg-1000 mg oral tablet: Last Dose Taken:  , 0.5 tab(s) orally 2 times a day  · 	sucralfate 1 g oral tablet: Last Dose Taken:  , 1 tab(s) orally 3 times a day  · 	ondansetron 4 mg oral tablet: Last Dose Taken:  , 1 tab(s) orally every 8 hours, As Needed  · 	pantoprazole 40 mg oral delayed release tablet: Last Dose Taken:  , 1 tab(s) orally 2 times a day  · 	HumaLOG 100 units/mL subcutaneous solution: Last Dose Taken:  , 4 unit(s) subcutaneous 3 times a day (before meals) as per blood sugar  · 	Tylenol 325 mg oral tablet: Last Dose Taken:  , 2 tab(s) orally every 6 hours, As Needed    MEDICATIONS  (STANDING):  ampicillin  IVPB      ampicillin  IVPB 2 Gram(s) IV Intermittent every 12 hours  brimonidine 0.2% Ophthalmic Solution 1 Drop(s) Both EYES two times a day  cyanocobalamin Injectable 1000 MICROGram(s) IntraMuscular daily  dextrose 40% Gel 15 Gram(s) Oral once  dextrose 5%. 1000 milliLiter(s) (50 mL/Hr) IV Continuous <Continuous>  dextrose 5%. 1000 milliLiter(s) (100 mL/Hr) IV Continuous <Continuous>  dextrose 50% Injectable 25 Gram(s) IV Push once  dextrose 50% Injectable 12.5 Gram(s) IV Push once  dextrose 50% Injectable 25 Gram(s) IV Push once  folic acid 1 milliGRAM(s) Enteral Tube daily  glucagon  Injectable 1 milliGRAM(s) IntraMuscular once  insulin lispro (ADMELOG) corrective regimen sliding scale   SubCutaneous every 6 hours  insulin NPH human recombinant 5 Unit(s) SubCutaneous once  iron sucrose IVPB 200 milliGRAM(s) IV Intermittent every 24 hours  latanoprost 0.005% Ophthalmic Solution 1 Drop(s) Both EYES at bedtime  levETIRAcetam  IVPB 500 milliGRAM(s) IV Intermittent every 12 hours  mirtazapine 15 milliGRAM(s) Oral at bedtime  pantoprazole  Injectable 40 milliGRAM(s) IV Push daily  sodium chloride 0.45%. 1000 milliLiter(s) (60 mL/Hr) IV Continuous <Continuous>  venlafaxine 37.5 milliGRAM(s) Oral every 12 hours    MEDICATIONS  (PRN):  acetaminophen   Tablet .. 650 milliGRAM(s) Oral every 6 hours PRN Temp greater or equal to 38C (100.4F), Mild Pain (1 - 3)  artificial  tears Solution 1 Drop(s) Both EYES four times a day PRN Dry Eyes  oxyCODONE    IR 5 milliGRAM(s) Oral every 4 hours PRN Moderate Pain (4 - 6)      Allergies  No Known Allergies    Review of Systems:  Constitutional: No fever  Cardiovascular: No chest pain, palpitations  Respiratory: No SOB, no cough  GI: No nausea, vomiting, abdominal pain    UNABLE TO OBTAIN full ROS due to mental status    PHYSICAL EXAM:  VITALS: T(C): 36.7 (08-02-21 @ 11:53)  T(F): 98 (08-02-21 @ 11:53), Max: 98.1 (08-02-21 @ 04:57)  HR: 113 (08-02-21 @ 11:53) (110 - 116)  BP: 149/76 (08-02-21 @ 11:53) (149/76 - 172/78)  RR:  (18 - 18)  SpO2:  (98% - 100%)  Wt(kg): --  GENERAL: NAD, well-developed  EYES: No proptosis, anicteric  HEENT:  Atraumatic, Normocephalic; KO feed tube in place  THYROID: Normal size, no palpable nodules  RESPIRATORY: Clear to auscultation bilaterally; No rales, rhonchi, wheezing  CARDIOVASCULAR: Regular rate and rhythm; No murmurs; no peripheral edema  GI: Soft, nontender, non distended, normal bowel sounds  SKIN: Dry, intact, No ulcers on feet  PSYCH: Alert and oriented x 2, reactive affect       POCT Blood Glucose.: 370 mg/dL (08-02-21 @ 11:52)  POCT Blood Glucose.: 300 mg/dL (08-02-21 @ 05:07)  POCT Blood Glucose.: 306 mg/dL (08-01-21 @ 23:05)  POCT Blood Glucose.: 354 mg/dL (08-01-21 @ 21:40)  POCT Blood Glucose.: 396 mg/dL (08-01-21 @ 20:01)  POCT Blood Glucose.: 429 mg/dL (08-01-21 @ 18:32)  POCT Blood Glucose.: 428 mg/dL (08-01-21 @ 18:31)  POCT Blood Glucose.: 457 mg/dL (08-01-21 @ 17:07)  POCT Blood Glucose.: 449 mg/dL (08-01-21 @ 17:06)  POCT Blood Glucose.: 354 mg/dL (08-01-21 @ 11:12)  POCT Blood Glucose.: 384 mg/dL (08-01-21 @ 04:11)  POCT Blood Glucose.: 392 mg/dL (07-31-21 @ 21:34)  POCT Blood Glucose.: 360 mg/dL (07-31-21 @ 17:40)  POCT Blood Glucose.: 298 mg/dL (07-31-21 @ 11:19)  POCT Blood Glucose.: 241 mg/dL (07-31-21 @ 04:06)  POCT Blood Glucose.: 216 mg/dL (07-30-21 @ 21:29)  POCT Blood Glucose.: 209 mg/dL (07-30-21 @ 17:30)                          3.6    6.15  )-----------( 184      ( 31 Jul 2021 14:47 )             12.0       08-01    154<H>  |  117<H>  |  48<H>  ----------------------------<  245<H>  3.6   |  22  |  1.85<H>    EGFR if : 31<L>  EGFR if non : 27<L>    Ca    9.2      08-01  Mg     2.4     08-01  Phos  3.1     08-01    TPro  7.3  /  Alb  3.2<L>  /  TBili  0.2  /  DBili  x   /  AST  240<H>  /  ALT  247<H>  /  AlkPhos  165<H>  07-31      Thyroid Function Tests:  07-29 @ 19:56 TSH 2.45 FreeT4 -- T3 -- Anti TPO -- Anti Thyroglobulin Ab -- TSI --      HbA1c 6.5%

## 2021-08-02 NOTE — PROGRESS NOTE ADULT - ASSESSMENT
71 yo F [employer], hx of breast Ca on active chemo (unsure of name) and anemia p/w reported vaginal bleeding. Recent admission to [facility] for the same, underwent EPO treatment and discharged. Reported Hgb of 5 during admission. Feeling generally weak, denies chest pain sob. Reports bright red blood in the toilet when she urinates. Denies dysuria, abdominal pain. Patient is unable to receive blood products of any kind.  has been tachycardic.

## 2021-08-02 NOTE — PROGRESS NOTE ADULT - SUBJECTIVE AND OBJECTIVE BOX
Patient is a 70y old  Female who presents with a chief complaint of severe anemia, acute blood loss 2/2 hematuria (02 Aug 2021 20:36)      SUBJECTIVE / OVERNIGHT EVENTS: awake, alert, on and off confusion, diaz in place w hematuria, no clots. ptn refused Blood work today. on NGT feeds w free water per NGT, hyperglycemia is ongoing, not controlled, endo on board    MEDICATIONS  (STANDING):  ampicillin  IVPB      ampicillin  IVPB 2 Gram(s) IV Intermittent every 12 hours  brimonidine 0.2% Ophthalmic Solution 1 Drop(s) Both EYES two times a day  cyanocobalamin Injectable 1000 MICROGram(s) IntraMuscular daily  dextrose 40% Gel 15 Gram(s) Oral once  dextrose 5%. 1000 milliLiter(s) (50 mL/Hr) IV Continuous <Continuous>  dextrose 5%. 1000 milliLiter(s) (100 mL/Hr) IV Continuous <Continuous>  dextrose 50% Injectable 25 Gram(s) IV Push once  dextrose 50% Injectable 12.5 Gram(s) IV Push once  dextrose 50% Injectable 25 Gram(s) IV Push once  folic acid 1 milliGRAM(s) Enteral Tube daily  glucagon  Injectable 1 milliGRAM(s) IntraMuscular once  insulin lispro (ADMELOG) corrective regimen sliding scale   SubCutaneous every 6 hours  iron sucrose IVPB 200 milliGRAM(s) IV Intermittent every 24 hours  latanoprost 0.005% Ophthalmic Solution 1 Drop(s) Both EYES at bedtime  levETIRAcetam  IVPB 500 milliGRAM(s) IV Intermittent every 12 hours  mirtazapine 15 milliGRAM(s) Oral at bedtime  pantoprazole  Injectable 40 milliGRAM(s) IV Push daily  sodium chloride 0.45%. 1000 milliLiter(s) (60 mL/Hr) IV Continuous <Continuous>  venlafaxine 37.5 milliGRAM(s) Oral every 12 hours    MEDICATIONS  (PRN):  acetaminophen   Tablet .. 650 milliGRAM(s) Oral every 6 hours PRN Temp greater or equal to 38C (100.4F), Mild Pain (1 - 3)  artificial  tears Solution 1 Drop(s) Both EYES four times a day PRN Dry Eyes  oxyCODONE    IR 5 milliGRAM(s) Oral every 4 hours PRN Moderate Pain (4 - 6)      Vital Signs Last 24 Hrs  T(F): 98.4 (08-02-21 @ 20:30), Max: 98.4 (08-02-21 @ 20:30)  HR: 108 (08-02-21 @ 20:30) (108 - 115)  BP: 147/71 (08-02-21 @ 20:30) (147/71 - 150/72)  RR: 18 (08-02-21 @ 20:30) (18 - 18)  SpO2: 98% (08-02-21 @ 20:30) (98% - 100%)  Telemetry:   CAPILLARY BLOOD GLUCOSE      POCT Blood Glucose.: 415 mg/dL (02 Aug 2021 17:47)  POCT Blood Glucose.: 370 mg/dL (02 Aug 2021 11:52)  POCT Blood Glucose.: 300 mg/dL (02 Aug 2021 05:07)  POCT Blood Glucose.: 306 mg/dL (01 Aug 2021 23:05)    I&O's Summary    01 Aug 2021 07:01  -  02 Aug 2021 07:00  --------------------------------------------------------  IN: 1600 mL / OUT: 3150 mL / NET: -1550 mL    02 Aug 2021 07:01  -  02 Aug 2021 22:01  --------------------------------------------------------  IN: 2070 mL / OUT: 2100 mL / NET: -30 mL        PHYSICAL EXAM:  GENERAL: NAD, well-developed  HEAD:  Atraumatic, Normocephalic  EYES: EOMI, PERRLA, conjunctiva and sclera clear  NECK: Supple, No JVD  CHEST/LUNG: Clear to auscultation bilaterally; No wheeze  HEART: Regular rate and rhythm; No murmurs, rubs, or gallops  ABDOMEN: Soft, Nontender, Nondistended; Bowel sounds present  EXTREMITIES:  2+ Peripheral Pulses, No clubbing, cyanosis, or edema  PSYCH: AAOx3  NEUROLOGY: non-focal  SKIN: No rashes or lesions    LABS:    08-01    154<H>  |  117<H>  |  48<H>  ----------------------------<  245<H>  3.6   |  22  |  1.85<H>    Ca    9.2      01 Aug 2021 08:59  Phos  3.1     08-01  Mg     2.4     08-01                RADIOLOGY & ADDITIONAL TESTS:    Imaging Personally Reviewed:    Consultant(s) Notes Reviewed:      Care Discussed with Consultants/Other Providers:

## 2021-08-03 LAB
ANION GAP SERPL CALC-SCNC: 13 MMOL/L — SIGNIFICANT CHANGE UP (ref 5–17)
BUN SERPL-MCNC: 51 MG/DL — HIGH (ref 7–23)
CALCIUM SERPL-MCNC: 9.7 MG/DL — SIGNIFICANT CHANGE UP (ref 8.4–10.5)
CHLORIDE SERPL-SCNC: 121 MMOL/L — HIGH (ref 96–108)
CO2 SERPL-SCNC: 25 MMOL/L — SIGNIFICANT CHANGE UP (ref 22–31)
CREAT SERPL-MCNC: 1.5 MG/DL — HIGH (ref 0.5–1.3)
GLUCOSE BLDC GLUCOMTR-MCNC: 264 MG/DL — HIGH (ref 70–99)
GLUCOSE BLDC GLUCOMTR-MCNC: 358 MG/DL — HIGH (ref 70–99)
GLUCOSE BLDC GLUCOMTR-MCNC: 361 MG/DL — HIGH (ref 70–99)
GLUCOSE SERPL-MCNC: 319 MG/DL — HIGH (ref 70–99)
HCT VFR BLD CALC: 16.4 % — CRITICAL LOW (ref 34.5–45)
HGB BLD-MCNC: 4.7 G/DL — CRITICAL LOW (ref 11.5–15.5)
MCHC RBC-ENTMCNC: 28.7 GM/DL — LOW (ref 32–36)
MCHC RBC-ENTMCNC: 35.9 PG — HIGH (ref 27–34)
MCV RBC AUTO: 125.2 FL — HIGH (ref 80–100)
NRBC # BLD: 12 /100 WBCS — HIGH (ref 0–0)
PLATELET # BLD AUTO: 153 K/UL — SIGNIFICANT CHANGE UP (ref 150–400)
POTASSIUM SERPL-MCNC: 3.7 MMOL/L — SIGNIFICANT CHANGE UP (ref 3.5–5.3)
POTASSIUM SERPL-SCNC: 3.7 MMOL/L — SIGNIFICANT CHANGE UP (ref 3.5–5.3)
RBC # BLD: 1.31 M/UL — LOW (ref 3.8–5.2)
RBC # FLD: 28.2 % — HIGH (ref 10.3–14.5)
SODIUM SERPL-SCNC: 159 MMOL/L — HIGH (ref 135–145)
WBC # BLD: 4.63 K/UL — SIGNIFICANT CHANGE UP (ref 3.8–10.5)
WBC # FLD AUTO: 4.63 K/UL — SIGNIFICANT CHANGE UP (ref 3.8–10.5)

## 2021-08-03 PROCEDURE — 93306 TTE W/DOPPLER COMPLETE: CPT | Mod: 26

## 2021-08-03 PROCEDURE — 99232 SBSQ HOSP IP/OBS MODERATE 35: CPT

## 2021-08-03 RX ORDER — HUMAN INSULIN 100 [IU]/ML
12 INJECTION, SUSPENSION SUBCUTANEOUS EVERY 6 HOURS
Refills: 0 | Status: DISCONTINUED | OUTPATIENT
Start: 2021-08-03 | End: 2021-08-03

## 2021-08-03 RX ORDER — SODIUM CHLORIDE 9 MG/ML
1000 INJECTION, SOLUTION INTRAVENOUS
Refills: 0 | Status: DISCONTINUED | OUTPATIENT
Start: 2021-08-03 | End: 2021-08-04

## 2021-08-03 RX ORDER — AMPICILLIN TRIHYDRATE 250 MG
2 CAPSULE ORAL EVERY 6 HOURS
Refills: 0 | Status: DISCONTINUED | OUTPATIENT
Start: 2021-08-03 | End: 2021-08-06

## 2021-08-03 RX ORDER — HUMAN INSULIN 100 [IU]/ML
15 INJECTION, SUSPENSION SUBCUTANEOUS EVERY 6 HOURS
Refills: 0 | Status: DISCONTINUED | OUTPATIENT
Start: 2021-08-03 | End: 2021-08-04

## 2021-08-03 RX ORDER — INSULIN LISPRO 100/ML
VIAL (ML) SUBCUTANEOUS EVERY 6 HOURS
Refills: 0 | Status: DISCONTINUED | OUTPATIENT
Start: 2021-08-03 | End: 2021-08-04

## 2021-08-03 RX ADMIN — LEVETIRACETAM 400 MILLIGRAM(S): 250 TABLET, FILM COATED ORAL at 12:12

## 2021-08-03 RX ADMIN — HUMAN INSULIN 8 UNIT(S): 100 INJECTION, SUSPENSION SUBCUTANEOUS at 05:57

## 2021-08-03 RX ADMIN — Medication 1 MILLIGRAM(S): at 12:20

## 2021-08-03 RX ADMIN — SODIUM CHLORIDE 60 MILLILITER(S): 9 INJECTION, SOLUTION INTRAVENOUS at 15:29

## 2021-08-03 RX ADMIN — MIRTAZAPINE 15 MILLIGRAM(S): 45 TABLET, ORALLY DISINTEGRATING ORAL at 21:59

## 2021-08-03 RX ADMIN — BRIMONIDINE TARTRATE 1 DROP(S): 2 SOLUTION/ DROPS OPHTHALMIC at 18:19

## 2021-08-03 RX ADMIN — PREGABALIN 1000 MICROGRAM(S): 225 CAPSULE ORAL at 13:53

## 2021-08-03 RX ADMIN — PANTOPRAZOLE SODIUM 40 MILLIGRAM(S): 20 TABLET, DELAYED RELEASE ORAL at 12:19

## 2021-08-03 RX ADMIN — Medication 37.5 MILLIGRAM(S): at 05:55

## 2021-08-03 RX ADMIN — Medication 37.5 MILLIGRAM(S): at 18:17

## 2021-08-03 RX ADMIN — Medication 6: at 18:18

## 2021-08-03 RX ADMIN — IRON SUCROSE 110 MILLIGRAM(S): 20 INJECTION, SOLUTION INTRAVENOUS at 12:23

## 2021-08-03 RX ADMIN — Medication 10: at 12:31

## 2021-08-03 RX ADMIN — Medication 216 GRAM(S): at 18:18

## 2021-08-03 RX ADMIN — Medication 5: at 05:56

## 2021-08-03 RX ADMIN — HUMAN INSULIN 15 UNIT(S): 100 INJECTION, SUSPENSION SUBCUTANEOUS at 18:17

## 2021-08-03 RX ADMIN — SODIUM CHLORIDE 60 MILLILITER(S): 9 INJECTION, SOLUTION INTRAVENOUS at 21:58

## 2021-08-03 RX ADMIN — Medication 216 GRAM(S): at 05:54

## 2021-08-03 RX ADMIN — LEVETIRACETAM 400 MILLIGRAM(S): 250 TABLET, FILM COATED ORAL at 21:58

## 2021-08-03 RX ADMIN — HUMAN INSULIN 12 UNIT(S): 100 INJECTION, SUSPENSION SUBCUTANEOUS at 12:30

## 2021-08-03 RX ADMIN — LATANOPROST 1 DROP(S): 0.05 SOLUTION/ DROPS OPHTHALMIC; TOPICAL at 21:58

## 2021-08-03 RX ADMIN — BRIMONIDINE TARTRATE 1 DROP(S): 2 SOLUTION/ DROPS OPHTHALMIC at 05:54

## 2021-08-03 NOTE — PROGRESS NOTE ADULT - SUBJECTIVE AND OBJECTIVE BOX
Subjective: Patient seen and examined. No new events except as noted.   more awake but confused     REVIEW OF SYSTEMS:  Unable to obtain       MEDICATIONS:  MEDICATIONS  (STANDING):  ampicillin  IVPB      ampicillin  IVPB 2 Gram(s) IV Intermittent every 12 hours  brimonidine 0.2% Ophthalmic Solution 1 Drop(s) Both EYES two times a day  cyanocobalamin Injectable 1000 MICROGram(s) IntraMuscular daily  dextrose 40% Gel 15 Gram(s) Oral once  dextrose 5%. 1000 milliLiter(s) (50 mL/Hr) IV Continuous <Continuous>  dextrose 5%. 1000 milliLiter(s) (100 mL/Hr) IV Continuous <Continuous>  dextrose 50% Injectable 25 Gram(s) IV Push once  dextrose 50% Injectable 12.5 Gram(s) IV Push once  dextrose 50% Injectable 25 Gram(s) IV Push once  folic acid 1 milliGRAM(s) Enteral Tube daily  glucagon  Injectable 1 milliGRAM(s) IntraMuscular once  insulin lispro (ADMELOG) corrective regimen sliding scale   SubCutaneous every 6 hours  insulin NPH human recombinant 12 Unit(s) SubCutaneous every 6 hours  iron sucrose IVPB 200 milliGRAM(s) IV Intermittent every 24 hours  latanoprost 0.005% Ophthalmic Solution 1 Drop(s) Both EYES at bedtime  levETIRAcetam  IVPB 500 milliGRAM(s) IV Intermittent every 12 hours  mirtazapine 15 milliGRAM(s) Oral at bedtime  pantoprazole  Injectable 40 milliGRAM(s) IV Push daily  venlafaxine 37.5 milliGRAM(s) Oral every 12 hours      PHYSICAL EXAM:  T(C): 37 (08-03-21 @ 04:58), Max: 37 (08-03-21 @ 04:58)  HR: 111 (08-03-21 @ 04:58) (108 - 113)  BP: 168/80 (08-03-21 @ 04:58) (147/71 - 168/80)  RR: 18 (08-03-21 @ 04:58) (18 - 18)  SpO2: 99% (08-03-21 @ 04:58) (98% - 99%)  Wt(kg): --  I&O's Summary    02 Aug 2021 07:01  -  03 Aug 2021 07:00  --------------------------------------------------------  IN: 2070 mL / OUT: 3300 mL / NET: -1230 mL            Appearance: NAD +NGT   HEENT:   Dry  oral mucosa	  Lymphatic: No lymphadenopathy , no edema  Cardiovascular: Normal S1 S2, No JVD, No murmurs , Peripheral pulses palpable 2+ bilaterally  Respiratory: Decreased bs   Gastrointestinal:  Soft, Non-tender, + BS	  Skin: No rashes, No ecchymoses, No cyanosis, warm to touch  Musculoskeletal: Decreased range of motion and  strength  Psychiatry:  sleepy lethargic   Ext: No edema  +diaz Hematuria       LABS:    CARDIAC MARKERS:                                4.7    4.63  )-----------( 153      ( 03 Aug 2021 08:17 )             16.4     08-03    159<H>  |  121<H>  |  51<H>  ----------------------------<  319<H>  3.7   |  25  |  1.50<H>    Ca    9.7      03 Aug 2021 08:17      proBNP:   Lipid Profile:   HgA1c:   TSH:             TELEMETRY: 	  SR ST   ECG:  	  RADIOLOGY:   DIAGNOSTIC TESTING:  [ ] Echocardiogram:  [ ]  Catheterization:  [ ] Stress Test:    OTHER:

## 2021-08-03 NOTE — PROGRESS NOTE ADULT - ASSESSMENT
69 yo F [employer], hx of breast Ca on active chemo (unsure of name) and anemia p/w reported vaginal bleeding. Recent admission to [facility] for the same, underwent EPO treatment and discharged. Reported Hgb of 5 during admission. Feeling generally weak, denies chest pain sob. Reports bright red blood in the toilet when she urinates. Denies dysuria, abdominal pain. Patient is unable to receive blood products of any kind.  has been tachycardic.

## 2021-08-03 NOTE — PHARMACOTHERAPY INTERVENTION NOTE - COMMENTS
SIMA WILL, 70y Female with Enterococcus bacteremia, changed to ampicillin 2 grams IV Q12H, now with improving renal function (downtrending serum creatinine).    Recommendation(s):  1) Consider increasing dose to ampicillin 2 grams IV Q6H.  2) Monitor renal function to guide further dose adjustments, if needed.    With kind regards,  Bill Nath, PharmD  Infectious Diseases Clinical Pharmacist  .

## 2021-08-03 NOTE — PROGRESS NOTE ADULT - SUBJECTIVE AND OBJECTIVE BOX
Chief Complaint: fu    History of Present Illness: pt resting, more awake/alert/responsive; +weak, no f/c, no cp/dyspnea/cough, no n/v/abd pain; diaz with pale pink, no clots      MEDICATIONS  (STANDING):  ampicillin  IVPB      ampicillin  IVPB 2 Gram(s) IV Intermittent every 12 hours  brimonidine 0.2% Ophthalmic Solution 1 Drop(s) Both EYES two times a day  cyanocobalamin Injectable 1000 MICROGram(s) IntraMuscular daily  dextrose 40% Gel 15 Gram(s) Oral once  dextrose 5%. 1000 milliLiter(s) (50 mL/Hr) IV Continuous <Continuous>  dextrose 5%. 1000 milliLiter(s) (100 mL/Hr) IV Continuous <Continuous>  dextrose 50% Injectable 25 Gram(s) IV Push once  dextrose 50% Injectable 12.5 Gram(s) IV Push once  dextrose 50% Injectable 25 Gram(s) IV Push once  folic acid 1 milliGRAM(s) Enteral Tube daily  glucagon  Injectable 1 milliGRAM(s) IntraMuscular once  insulin lispro (ADMELOG) corrective regimen sliding scale   SubCutaneous every 6 hours  insulin NPH human recombinant 12 Unit(s) SubCutaneous every 6 hours  iron sucrose IVPB 200 milliGRAM(s) IV Intermittent every 24 hours  latanoprost 0.005% Ophthalmic Solution 1 Drop(s) Both EYES at bedtime  levETIRAcetam  IVPB 500 milliGRAM(s) IV Intermittent every 12 hours  mirtazapine 15 milliGRAM(s) Oral at bedtime  pantoprazole  Injectable 40 milliGRAM(s) IV Push daily  venlafaxine 37.5 milliGRAM(s) Oral every 12 hours    MEDICATIONS  (PRN):  acetaminophen   Tablet .. 650 milliGRAM(s) Oral every 6 hours PRN Temp greater or equal to 38C (100.4F), Mild Pain (1 - 3)  artificial  tears Solution 1 Drop(s) Both EYES four times a day PRN Dry Eyes  oxyCODONE    IR 5 milliGRAM(s) Oral every 4 hours PRN Moderate Pain (4 - 6)      Allergies    No Known Allergies    Intolerances        Vital Signs Last 24 Hrs  T(C): 36.7 (03 Aug 2021 11:43), Max: 37 (03 Aug 2021 04:58)  T(F): 98.1 (03 Aug 2021 11:43), Max: 98.6 (03 Aug 2021 04:58)  HR: 110 (03 Aug 2021 11:43) (108 - 111)  BP: 151/71 (03 Aug 2021 11:43) (147/71 - 168/80)  BP(mean): --  RR: 18 (03 Aug 2021 11:43) (18 - 18)  SpO2: 98% (03 Aug 2021 11:43) (98% - 99%)    PHYSICAL EXAM  General: adult in NAD  HEENT: NGT  Neck: supple  CV: normal S1/S2 with no murmur rubs or gallops  Lungs: decreased BS  Abdomen: soft non-tender non-distended, positive bowel sounds  Ext: no clubbing cyanosis or edema  Skin: no rashes and no petechiae  Lymph Nodes: No LAD in neck  Neuro: more alert/responsive    LABS:                          4.7    4.63  )-----------( 153      ( 03 Aug 2021 08:17 )             16.4         Mean Cell Volume : 125.2 fl  Mean Cell Hemoglobin : 35.9 pg  Mean Cell Hemoglobin Concentration : 28.7 gm/dL  Auto Neutrophil # : x  Auto Lymphocyte # : x  Auto Monocyte # : x  Auto Eosinophil # : x  Auto Basophil # : x  Auto Neutrophil % : x  Auto Lymphocyte % : x  Auto Monocyte % : x  Auto Eosinophil % : x  Auto Basophil % : x      Serial CBC's  08-03 @ 08:17  Hct-16.4 / Hgb-4.7 / Plat-153 / RBC-1.31 / WBC-4.63  Serial CBC's  07-31 @ 14:47  Hct-12.0 / Hgb-3.6 / Plat-184 / RBC-1.06 / WBC-6.15      08-03    159<H>  |  121<H>  |  51<H>  ----------------------------<  319<H>  3.7   |  25  |  1.50<H>    Ca    9.7      03 Aug 2021 08:17            Ferritin, Serum: 492 ng/mL (07-28 @ 09:02)              Radiology:

## 2021-08-03 NOTE — PROGRESS NOTE ADULT - SUBJECTIVE AND OBJECTIVE BOX
Patient is a 70y old  Female who presents with a chief complaint of severe anemia, acute blood loss 2/2 hematuria (03 Aug 2021 19:02)      SUBJECTIVE / OVERNIGHT EVENTS: ptn more awake and alert. on and off confusion. HH improving. heme following, diaz w pink urine. hypernatremia worsening despite FREE water via NGT    MEDICATIONS  (STANDING):  ampicillin  IVPB 2 Gram(s) IV Intermittent every 6 hours  brimonidine 0.2% Ophthalmic Solution 1 Drop(s) Both EYES two times a day  dextrose 40% Gel 15 Gram(s) Oral once  dextrose 5%. 1000 milliLiter(s) (50 mL/Hr) IV Continuous <Continuous>  dextrose 5%. 1000 milliLiter(s) (100 mL/Hr) IV Continuous <Continuous>  dextrose 50% Injectable 25 Gram(s) IV Push once  dextrose 50% Injectable 12.5 Gram(s) IV Push once  dextrose 50% Injectable 25 Gram(s) IV Push once  folic acid 1 milliGRAM(s) Enteral Tube daily  glucagon  Injectable 1 milliGRAM(s) IntraMuscular once  insulin lispro (ADMELOG) corrective regimen sliding scale   SubCutaneous every 6 hours  insulin NPH human recombinant 15 Unit(s) SubCutaneous every 6 hours  iron sucrose IVPB 200 milliGRAM(s) IV Intermittent every 24 hours  latanoprost 0.005% Ophthalmic Solution 1 Drop(s) Both EYES at bedtime  levETIRAcetam  IVPB 500 milliGRAM(s) IV Intermittent every 12 hours  mirtazapine 15 milliGRAM(s) Oral at bedtime  pantoprazole  Injectable 40 milliGRAM(s) IV Push daily  sodium chloride 0.45%. 1000 milliLiter(s) (60 mL/Hr) IV Continuous <Continuous>  venlafaxine 37.5 milliGRAM(s) Oral every 12 hours    MEDICATIONS  (PRN):  acetaminophen   Tablet .. 650 milliGRAM(s) Oral every 6 hours PRN Temp greater or equal to 38C (100.4F), Mild Pain (1 - 3)  artificial  tears Solution 1 Drop(s) Both EYES four times a day PRN Dry Eyes  oxyCODONE    IR 5 milliGRAM(s) Oral every 4 hours PRN Moderate Pain (4 - 6)      Vital Signs Last 24 Hrs  T(F): 98.1 (08-03-21 @ 11:43), Max: 98.6 (08-03-21 @ 04:58)  HR: 110 (08-03-21 @ 11:43) (108 - 111)  BP: 151/71 (08-03-21 @ 11:43) (147/71 - 168/80)  RR: 18 (08-03-21 @ 11:43) (18 - 18)  SpO2: 98% (08-03-21 @ 11:43) (98% - 99%)  Telemetry:   CAPILLARY BLOOD GLUCOSE      POCT Blood Glucose.: 264 mg/dL (03 Aug 2021 17:38)  POCT Blood Glucose.: 358 mg/dL (03 Aug 2021 12:22)  POCT Blood Glucose.: 361 mg/dL (03 Aug 2021 05:54)  POCT Blood Glucose.: 364 mg/dL (02 Aug 2021 23:23)    I&O's Summary    02 Aug 2021 07:01  -  03 Aug 2021 07:00  --------------------------------------------------------  IN: 2070 mL / OUT: 3300 mL / NET: -1230 mL        PHYSICAL EXAM:  GENERAL: NAD, well-developed  HEAD:  Atraumatic, Normocephalic  EYES: EOMI, PERRLA, conjunctiva and sclera clear  NECK: Supple, No JVD  CHEST/LUNG: Clear to auscultation bilaterally; No wheeze  HEART: Regular rate and rhythm; No murmurs, rubs, or gallops  ABDOMEN: Soft, Nontender, Nondistended; Bowel sounds present  EXTREMITIES:  2+ Peripheral Pulses, No clubbing, cyanosis, or edema  PSYCH: AAOx3  NEUROLOGY: non-focal  SKIN: No rashes or lesions    LABS:                        4.7    4.63  )-----------( 153      ( 03 Aug 2021 08:17 )             16.4     08-03    159<H>  |  121<H>  |  51<H>  ----------------------------<  319<H>  3.7   |  25  |  1.50<H>    Ca    9.7      03 Aug 2021 08:17                RADIOLOGY & ADDITIONAL TESTS:    Imaging Personally Reviewed:    Consultant(s) Notes Reviewed:      Care Discussed with Consultants/Other Providers:   Full range of motion of upper and lower extremities, no joint tenderness/swelling.

## 2021-08-03 NOTE — PROGRESS NOTE ADULT - ASSESSMENT
Seen and examined at bedside  confused . 1 on 1  NAD    acetaminophen   Tablet .. 650 milliGRAM(s) Oral every 6 hours PRN  ampicillin  IVPB      ampicillin  IVPB 2 Gram(s) IV Intermittent every 12 hours  artificial  tears Solution 1 Drop(s) Both EYES four times a day PRN  brimonidine 0.2% Ophthalmic Solution 1 Drop(s) Both EYES two times a day  cyanocobalamin Injectable 1000 MICROGram(s) IntraMuscular daily  dextrose 40% Gel 15 Gram(s) Oral once  dextrose 5%. 1000 milliLiter(s) IV Continuous <Continuous>  dextrose 5%. 1000 milliLiter(s) IV Continuous <Continuous>  dextrose 50% Injectable 25 Gram(s) IV Push once  dextrose 50% Injectable 12.5 Gram(s) IV Push once  dextrose 50% Injectable 25 Gram(s) IV Push once  folic acid 1 milliGRAM(s) Enteral Tube daily  glucagon  Injectable 1 milliGRAM(s) IntraMuscular once  insulin lispro (ADMELOG) corrective regimen sliding scale   SubCutaneous every 6 hours  insulin NPH human recombinant 12 Unit(s) SubCutaneous every 6 hours  iron sucrose IVPB 200 milliGRAM(s) IV Intermittent every 24 hours  latanoprost 0.005% Ophthalmic Solution 1 Drop(s) Both EYES at bedtime  levETIRAcetam  IVPB 500 milliGRAM(s) IV Intermittent every 12 hours  mirtazapine 15 milliGRAM(s) Oral at bedtime  oxyCODONE    IR 5 milliGRAM(s) Oral every 4 hours PRN  pantoprazole  Injectable 40 milliGRAM(s) IV Push daily  venlafaxine 37.5 milliGRAM(s) Oral every 12 hours      VITAL:  T(C): , Max: 37 (08-03-21 @ 04:58)  T(F): , Max: 98.6 (08-03-21 @ 04:58)  HR: 111 (08-03-21 @ 04:58)  BP: 168/80 (08-03-21 @ 04:58)  BP(mean): --  RR: 18 (08-03-21 @ 04:58)  SpO2: 99% (08-03-21 @ 04:58)  Wt(kg): --    08-02-21 @ 07:01  -  08-03-21 @ 07:00  --------------------------------------------------------  IN: 2070 mL / OUT: 3300 mL / NET: -1230 mL        PHYSICAL EXAM:  Constitutional: lethargic/mildly confused  HEENT: DMM, (+)NGT  Neck: Supple, No JVD  Respiratory: CTA-b/l  Cardiovascular: tachy reg s1s2  Gastrointestinal: BS+, soft, NT/ND  : diaz-cbi   Extremities: No peripheral edema b/l  Neurological: no focal deficits; strength grossly intact  Back: no CVAT b/l  Skin: No rashes, no nevi    LABS:                          4.7    4.63  )-----------( 153      ( 03 Aug 2021 08:17 )             16.4     Na(159)/K(3.7)/Cl(121)/HCO3(25)/BUN(51)/Cr(1.50)Glu(319)/Ca(9.7)/Mg(--)/PO4(--)    08-03 @ 08:17  Na(154)/K(3.6)/Cl(117)/HCO3(22)/BUN(48)/Cr(1.85)Glu(245)/Ca(9.2)/Mg(2.4)/PO4(3.1)    08-01 @ 08:59  Na(152)/K(3.4)/Cl(114)/HCO3(21)/BUN(46)/Cr(2.08)Glu(274)/Ca(9.3)/Mg(--)/PO4(--)    07-31 @ 14:47            IMPRESSION: 70F, [employer], with breast CA, 7/27/21 p/w vaginal bleeding/severe anemia    (1)Renal - GLEN - ischemic ATN - slowly resolving     (2)Hypernatremia - worsening as of today;  Off D5W IV in setting of her hyperglycemia    (3)ID - enterococcal bacteremia - on IV Ampicillin      RECOMMEND:  (1)c/w Free water 250cc qid via NGT as ordered  (2)Add 1/2NS@60cc/h for now  (3)Dose new meds for GFR 20-30ml/min (present dosing is acceptable)  (4)BMP daily      Luis Castillo NP-BC  [employer]  [phone]

## 2021-08-03 NOTE — PROGRESS NOTE ADULT - ASSESSMENT
69 yo F [employer], hx of breast Ca on active chemo (unsure of name) and anemia p/w reported vaginal bleeding/hematuria.     Patient with metastatic breast cancer  Now presenting with severe anemia  Found to have CT A/P with nonenhancing debris in the bladder (clot) and soft tissue lesion around the distal right ureter and evidence of likely metastatic neoplasm.     UCx with Enterococcus faecalis  BCx with Enterococcus faecalis    TTE (8/3) was limited and without vegetations but with severe aortic regurgitation  Need for KIM to be determined if repeat cultures are positive and based on course    Ideally would check repeat BCx (ordered)    Overall, Enterococcus bacteremia, UTI, Fever, GLEN    --Recommend increasing Ampicillin to 2g IV Q6H (ordered)  --Recommend 2x repeat blood cultures in AM (ordered)  --Continue to follow CBC with diff  --Continue to follow renal function (Cr/BUN)  --Continue to follow transaminases  --Continue to follow temperature curve    I will continue to follow. Please feel free to contact me with any further questions.    Eric Stewart M.D.  [facility] Division of Infectious Disease  8AM-5PM: Pager Number [phone]  After Hours (or if no response): Please contact the Infectious Diseases Office at [phone]

## 2021-08-03 NOTE — PROGRESS NOTE ADULT - ASSESSMENT
69 yo Female [employer] witness with PMHx of breast CA s/p BL mastectomy with recurrence and metastasis here with c/o vaginal bleeding, found to have bladder hematoma on pelvic ultrasound. Pt also with R sided nephU with mild hydro, but pt unsure indication for nephU.   on admission: H/H 4.3/13.5, SCr 1.37.  Pt follows up at [facility] and is currently receiving chemotherapy.     7/28: Hct 11.2 from 13.5 yesterday. ptn is pale, lethargic, dyspneic, expresses wishes not to have any blood products based on her GD DOV. ptn placed her proxy on the phone Mr. Gray who wanted to confirm the hospital is respecting ptn's wishes. both ptn and the proxy aksed baout being transferred to [facility]. however ptn is not stable w HGB of 3 to be travelling. denies CP, palps, HA, has ongoing hematuria, CBI in place. Ct C/A/P reviewed. ptn w metastatic dz process on CT scan. Heme consult called. will cont CBI as per . pending CT urogram. cont trending HH. started on IV IRON. prognosis is guarded.   7/29: ptn found unresponsive with a droop, fever, ongoing hematuria, had filled out a MOLST form , she is DNR/DNI, code stroke called, will give Abx, keep temp down w cooling blanket, since ptn cannot receive anticoagulation/is dnr/dni will not get CTA of brain, will place on KEPPRA for sz, get eeg, prognosis is grave. this was d/w ,Mr Gray ptn's proxy. MICU consult reviewed. prognosis is grave  7/30:   ptn is morwe responsive today though minimally, has enterococcal bacteremia. seen by ID , placed on IV AMPICILLIN while awaiting sensitivities. rpt blood cx sent. will check HH in am. all blood draws should be done in pediatric [facility] . GLEN, ongoing 2/2 ischemic ATN, hematuria improving  7/31:  little more responsive today, has hypernatremia, will start free water via NGT. has clots in CBI. rpt hgb3.6, ptn is hyperglycemic. will call endo  8/1: ptn is lethargic, hematuria has improved, now off  CBI, ongoing GLEN and hypernatremia though improved, heme, renal  following. Hyperglycemia, will dc d5W drip, place on standing LANTUS , cont Admelog on a sliding scale, Dr. Guthrie from [facility] called. increase free water intake to 250 q4H per NGT. rpt cbc and BMP in am via pediatric tubes. tranexamic acid, EPO, IV iron, folic acid and B12 as per heme  8/2: awake, alert, responds appropriately. on and off confusion, diaz in place w hematuria, no clots. off CBI.  ptn refused Blood work today. on NGT feeds w free water per NGT, hyperglycemia is ongoing, not controlled, endo on board. will DC IVF  8/3: ptn more awake and alert. on and off confusion. HH improving. heme following, diaz w pink urine. hypernatremia worsening despite FREE water via NGT

## 2021-08-03 NOTE — PROGRESS NOTE ADULT - PROBLEM SELECTOR PLAN 1
-Test BG ac and hs  -Increased NPH dose to 12 units q 6h  -Increased Admelog correction scale to moderate dose q6h  -If BG >180s at 6pm today please increase NPH dose to 15 units q6h. HOLD IF TFS HELD.  Disposition  -Will depend on PO/TF status, insulin requirements and BG levels.  -Pt unable to care for her DM independently at this time. Would need family to take care at home if going back home.  -Plan discussed with pt/team.  Contact info: [phone] (24/7). pager [phone]

## 2021-08-03 NOTE — PROGRESS NOTE ADULT - SUBJECTIVE AND OBJECTIVE BOX
Follow Up:      Interval History:    REVIEW OF SYSTEMS  [  ] ROS unobtainable because:    [  ] All other systems negative except as noted below    Constitutional:  [ ] fever [ ] chills  [ ] weight loss  [ ] weakness  Skin:  [ ] rash [ ] phlebitis	  Eyes: [ ] icterus [ ] pain  [ ] discharge	  ENMT: [ ] sore throat  [ ] thrush [ ] ulcers [ ] exudates  Respiratory: [ ] dyspnea [ ] hemoptysis [ ] cough [ ] sputum	  Cardiovascular:  [ ] chest pain [ ] palpitations [ ] edema	  Gastrointestinal:  [ ] nausea [ ] vomiting [ ] diarrhea [ ] constipation [ ] pain	  Genitourinary:  [ ] dysuria [ ] frequency [ ] hematuria [ ] discharge [ ] flank pain  [ ] incontinence  Musculoskeletal:  [ ] myalgias [ ] arthralgias [ ] arthritis  [ ] back pain  Neurological:  [ ] headache [ ] seizures  [ ] confusion/altered mental status    Allergies  No Known Allergies        ANTIMICROBIALS:  ampicillin  IVPB 2 every 6 hours      OTHER MEDS:  MEDICATIONS  (STANDING):  acetaminophen   Tablet .. 650 every 6 hours PRN  dextrose 40% Gel 15 once  dextrose 50% Injectable 25 once  dextrose 50% Injectable 12.5 once  dextrose 50% Injectable 25 once  glucagon  Injectable 1 once  insulin lispro (ADMELOG) corrective regimen sliding scale  every 6 hours  insulin NPH human recombinant 15 every 6 hours  levETIRAcetam  IVPB 500 every 12 hours  mirtazapine 15 at bedtime  oxyCODONE    IR 5 every 4 hours PRN  pantoprazole  Injectable 40 daily  venlafaxine 37.5 every 12 hours      Vital Signs Last 24 Hrs  T(C): 36.7 (03 Aug 2021 11:43), Max: 37 (03 Aug 2021 04:58)  T(F): 98.1 (03 Aug 2021 11:43), Max: 98.6 (03 Aug 2021 04:58)  HR: 110 (03 Aug 2021 11:43) (108 - 111)  BP: 151/71 (03 Aug 2021 11:43) (147/71 - 168/80)  BP(mean): --  RR: 18 (03 Aug 2021 11:43) (18 - 18)  SpO2: 98% (03 Aug 2021 11:43) (98% - 99%)    PHYSICAL EXAMINATION:  General: Alert and Awake, NAD  HEENT: PERRL, EOMI  Neck: Supple  Cardiac: RRR, No M/R/G  Resp: CTAB, No Wh/Rh/Ra  Abdomen: NBS, NT/ND, No HSM, No rigidity or guarding  MSK: No LE edema. No Calf tenderness  : No diaz  Skin: No rashes or lesions. Skin is warm and dry to the touch.   Neuro: Alert and Awake. CN 2-12 Grossly intact. Moves all four extremities spontaneously.  Psych: Calm, Pleasant, Cooperative                          4.7    4.63  )-----------( 153      ( 03 Aug 2021 08:17 )             16.4       08-03    159<H>  |  121<H>  |  51<H>  ----------------------------<  319<H>  3.7   |  25  |  1.50<H>    Ca    9.7      03 Aug 2021 08:17            MICROBIOLOGY:  v  .Blood Blood-Peripheral  07-31-21   No growth to date.  --  --      .Blood Blood-Peripheral  07-29-21   Growth in aerobic and anaerobic bottles: Enterococcus faecalis  ***Blood Panel PCR results on this specimen are available  approximately 3 hours after the Gram stain result.***  Gram stain, PCR, and/or culture results may not always  correspond dueto difference in methodologies.  ************************************************************  This PCR assay was performed by multiplex PCR. This  Assay tests for 66 bacterial and resistance gene targets.  Please refer to the [facility] test directory  at [email or web address] for details.  --  Blood Culture PCR  Enterococcus faecalis      Catheterized Catheterized  07-28-21   >100,000 CFU/ml Enterococcus faecalis  --  Enterococcus faecalis                RADIOLOGY:    <The imaging below has been reviewed and visualized by me independently. Findings as detailed in report below> Follow Up:  Bacteremia    Interval History: afebrile. more alert today.     REVIEW OF SYSTEMS  [  ] ROS unobtainable because:    [x  ] All other systems negative except as noted below    Constitutional:  [ ] fever [ ] chills  [ ] weight loss  [ ] weakness  Skin:  [ ] rash [ ] phlebitis	  Eyes: [ ] icterus [ ] pain  [ ] discharge	  ENMT: [ ] sore throat  [ ] thrush [ ] ulcers [ ] exudates  Respiratory: [ ] dyspnea [ ] hemoptysis [ ] cough [ ] sputum	  Cardiovascular:  [ ] chest pain [ ] palpitations [ ] edema	  Gastrointestinal:  [ ] nausea [ ] vomiting [ ] diarrhea [ ] constipation [ ] pain	  Genitourinary:  [ ] dysuria [ ] frequency [ ] hematuria [ ] discharge [ ] flank pain  [ ] incontinence  Musculoskeletal:  [ ] myalgias [ ] arthralgias [ ] arthritis  [ ] back pain  Neurological:  [ ] headache [ ] seizures  [ ] confusion/altered mental status    Allergies  No Known Allergies        ANTIMICROBIALS:  ampicillin  IVPB 2 every 6 hours      OTHER MEDS:  MEDICATIONS  (STANDING):  acetaminophen   Tablet .. 650 every 6 hours PRN  dextrose 40% Gel 15 once  dextrose 50% Injectable 25 once  dextrose 50% Injectable 12.5 once  dextrose 50% Injectable 25 once  glucagon  Injectable 1 once  insulin lispro (ADMELOG) corrective regimen sliding scale  every 6 hours  insulin NPH human recombinant 15 every 6 hours  levETIRAcetam  IVPB 500 every 12 hours  mirtazapine 15 at bedtime  oxyCODONE    IR 5 every 4 hours PRN  pantoprazole  Injectable 40 daily  venlafaxine 37.5 every 12 hours      Vital Signs Last 24 Hrs  T(C): 36.7 (03 Aug 2021 11:43), Max: 37 (03 Aug 2021 04:58)  T(F): 98.1 (03 Aug 2021 11:43), Max: 98.6 (03 Aug 2021 04:58)  HR: 110 (03 Aug 2021 11:43) (108 - 111)  BP: 151/71 (03 Aug 2021 11:43) (147/71 - 168/80)  BP(mean): --  RR: 18 (03 Aug 2021 11:43) (18 - 18)  SpO2: 98% (03 Aug 2021 11:43) (98% - 99%)    PHYSICAL EXAMINATION:  General: Alert and Awake, NAD  Cardiac: RRR, No M/R/G  Resp: CTAB, No Wh/Rh/Ra  Abdomen: NBS, NT/ND, No HSM, No rigidity or guarding  MSK: No LE edema. No Calf tenderness  Skin: No rashes or lesions. Skin is warm and dry to the touch.   Neuro: Alert and Awake. CN 2-12 Grossly intact. Moves all four extremities spontaneously.  Psych: Calm, Pleasant, Cooperative                          4.7    4.63  )-----------( 153      ( 03 Aug 2021 08:17 )             16.4       08-03    159<H>  |  121<H>  |  51<H>  ----------------------------<  319<H>  3.7   |  25  |  1.50<H>    Ca    9.7      03 Aug 2021 08:17            MICROBIOLOGY:  v  .Blood Blood-Peripheral  07-31-21   No growth to date.  --  --      .Blood Blood-Peripheral  07-29-21   Growth in aerobic and anaerobic bottles: Enterococcus faecalis  ***Blood Panel PCR results on this specimen are available  approximately 3 hours after the Gram stain result.***  Gram stain, PCR, and/or culture results may not always  correspond dueto difference in methodologies.  ************************************************************  This PCR assay was performed by multiplex PCR. This  Assay tests for 66 bacterial and resistance gene targets.  Please refer to the [facility] test directory  at [email or web address] for details.  --  Blood Culture PCR  Enterococcus faecalis      Catheterized Catheterized  07-28-21   >100,000 CFU/ml Enterococcus faecalis  --  Enterococcus faecalis    RADIOLOGY:    <The imaging below has been reviewed and visualized by me independently. Findings as detailed in report below>    < from: US Transvaginal (07.27.21 @ 16:20) >  IMPRESSION:  Hematoma and layering debris within the bladder in the context of right-sided nephroureterostomy and mild hydronephrosis.  No intrauterine pathology.    < from: CT Abdomen and Pelvis w/wo IV Cont (07.27.21 @ 21:54) >  IMPRESSION:  1.  Nonenhancing debris within the urinary bladder, likely reflecting blood clot, as seen on the prior ultrasound exam.  2.  Amorphous enhancing soft tissue surrounding the distal right ureter, which is of uncertain etiology but potentially reflecting neoplasm. Correlation with prior imaging would be helpful.  3.  Scattered sclerotic lesions within the visualized spine, indeterminate but suspicious for osseous metastatic disease. Age-indeterminate mild compression fracture deformities of T8 and T10 with associated sclerotic changes potentially due to degenerative changes, though metastatic disease is not excluded.  4.  Right nephroureteral catheter with mild residual right hydronephrosis.  5.  Atrophy of the left kidney with asymmetricly decreased contrast excretion, likely reflecting decreased renal function. No left hydronephrosis.  6.  Numerous subcentimeter hypoattenuating liver lesions which are indeterminate. Metastatic disease is not excluded. Consider further assessment with nonemergent abdominal MRI if not already performed.  7.  Small right pleural effusion with mildly thickened and enhancing pleura, which may be seen in the setting of infection or neoplastic involvement.  8.  Fecalization of the small bowel, which may be seen in the setting of stasis/small bowel bacterial overgrowth. No evidence of obstruction. Moderate colonic stool burden, likely reflecting constipation.    < from: Xray Chest 1 View- PORTABLE-Urgent (Xray Chest 1 View- PORTABLE-Urgent .) (07.30.21 @ 08:44) >  IMPRESSION:  Feeding tube tip terminates in the stomach.  Small/moderate right pleural effusion and right middle and lower lobe linear opacities, likely atelectasis unchanged.

## 2021-08-03 NOTE — PROGRESS NOTE ADULT - ASSESSMENT
69 y/o F, [employer]ism, w/h/o T2DM> HbA1c 6.5% (Skewed due to severe anemia) . DM c/b neuropath, CKD. Also h/o breast Ca on chemo (unsure of name) anemia, HTN, HLD/ Here with severe anemia, acute blood loss 2/2 hematuria> unable ot receive blood. Consult called for management of hyperglycemia while on continuous TFs. Increased NPH dose today. Will need to wait and see BG at 6pm to adjust insulin doses as needed. No hypoglycemia. BG goal 100-180 mg/dL.

## 2021-08-03 NOTE — PROGRESS NOTE ADULT - SUBJECTIVE AND OBJECTIVE BOX
DIABETES FOLLOW UP NOTE: Saw pt earlier today  INTERVAL HX: 69 y/o F, Anglican, w/h/o T2DM> HbA1c 6.5% (Skewed due to severe anemia) . DM c/b neuropathy/CKD. Also h/o breast Ca on chemo (unsure of name) anemia, HTN, HLD/ Here with severe anemia, acute blood loss 2/2 hematuria> unable ot receive blood. Consult called for management of hyperglycemia to 400's to 500's in patient on continuous 24 hour TFs of  Glucerna at 50cc/hr. BGs improve but remain >300s today> increased NPH dose today. No hypoglycemia. Pt able to answer simple questions but unable to elaborated> gets easily confused. Asking for her boots because her truck is picking her up soon. Denies any pain or hunger. Can't recall DM meds taken at home. States taking insulin " half of it"    Review of Systems:  General: As above   Cardiovascular: No chest pain, palpitations  Respiratory: No SOB, no cough  GI: No nausea, vomiting, abdominal pain  Endocrine: No S&Sx of hypoglycemia    Allergies    No Known Allergies    Intolerances      MEDICATIONS:  ampicillin  IVPB 2 Gram(s) IV Intermittent every 12 hours  insulin lispro (ADMELOG) corrective regimen sliding scale   SubCutaneous every 6 hours  insulin NPH human recombinant 12 Unit(s) SubCutaneous every 6 hours      PHYSICAL EXAM:  VITALS: T(C): 36.7 (08-03-21 @ 11:43)  T(F): 98.1 (08-03-21 @ 11:43), Max: 98.6 (08-03-21 @ 04:58)  HR: 110 (08-03-21 @ 11:43) (108 - 111)  BP: 151/71 (08-03-21 @ 11:43) (147/71 - 168/80)  RR:  (18 - 18)  SpO2:  (98% - 99%)  Wt(kg): --  GENERAL: Female laying in bed in NAD  HEENT: NGT with TFs going at 50cc/hr  Abdomen: Soft, nontender, non distended  Extremities: Warm, no edema in all 4 exts  NEURO: Alert but easily confused. Able to answer simple yes/no to questions    LABS:  POCT Blood Glucose.: 358 mg/dL (08-03-21 @ 12:22)  POCT Blood Glucose.: 361 mg/dL (08-03-21 @ 05:54)  POCT Blood Glucose.: 364 mg/dL (08-02-21 @ 23:23)  POCT Blood Glucose.: 415 mg/dL (08-02-21 @ 17:47)  POCT Blood Glucose.: 370 mg/dL (08-02-21 @ 11:52)  POCT Blood Glucose.: 300 mg/dL (08-02-21 @ 05:07)  POCT Blood Glucose.: 306 mg/dL (08-01-21 @ 23:05)  POCT Blood Glucose.: 354 mg/dL (08-01-21 @ 21:40)  POCT Blood Glucose.: 396 mg/dL (08-01-21 @ 20:01)  POCT Blood Glucose.: 429 mg/dL (08-01-21 @ 18:32)  POCT Blood Glucose.: 428 mg/dL (08-01-21 @ 18:31)  POCT Blood Glucose.: 457 mg/dL (08-01-21 @ 17:07)  POCT Blood Glucose.: 449 mg/dL (08-01-21 @ 17:06)  POCT Blood Glucose.: 354 mg/dL (08-01-21 @ 11:12)  POCT Blood Glucose.: 384 mg/dL (08-01-21 @ 04:11)  POCT Blood Glucose.: 392 mg/dL (07-31-21 @ 21:34)  POCT Blood Glucose.: 360 mg/dL (07-31-21 @ 17:40)                            4.7    4.63  )-----------( 153      ( 03 Aug 2021 08:17 )             16.4       08-03    159<H>  |  121<H>  |  51<H>  ----------------------------<  319<H>  3.7   |  25  |  1.50<H>      EGFR if non : 35<L>    Ca    9.7      08-03  Mg     2.4     08-01  Phos  3.1     08-01    TPro  7.3  /  Alb  3.2<L>  /  TBili  0.2  /  DBili  x   /  AST  240<H>  /  ALT  247<H>  /  AlkPhos  165<H>  07-31      Thyroid Function Tests:  07-29 @ 19:56 TSH 2.45 FreeT4 -- T3 -- Anti TPO -- Anti Thyroglobulin Ab -- TSI --      A1C with Estimated Average Glucose Result: 6.5 % (07-28-21 @ 09:21)      Estimated Average Glucose: 140 mg/dL (07-28-21 @ 09:21)

## 2021-08-03 NOTE — PROGRESS NOTE ADULT - ASSESSMENT
69 yo F [employer]ism, hx of metastatic breast cancer, anemia p/w hematuria, started on CBI, c/b RRT/ code stroke, presumed ischemic CVA    #Anemia, iron deficiency  - pt is [facility], does not accept blood products- established by patient per chart review prior to AMS  - received EPO 40, 000 unit 7/28; will hold on giving higher dose therapy (3x/week) as blood sparing treatment due to acute CVA  - s/p IV iron 200mg daily x 5, will continue for 3 further doses  - continue folic acid and IM B12  - LIMIT blood draws- no need for routine CBC; agree with Pediatric tubes  - Hg 3.6 on 7/31; improved 4.7 8/3- monitor periodically    #hematuria-  - US/ CT with no intrauterine source, with + bladder hematoma on CBI, was clamped- restarted due to clots  - prior R nephroureteral catheter with mild residual r hydronephrosis; with soft tissue mass surrounding distal right ureter  - Urology is following  - was redosed tranexamic acid at 10mg/kg dose- 500mg IV over 30 minutes on 7/31 with critically low Hg with bleeding, despite risk of thrombosis  - now off CBI, urine darker, no clots, continue to monitor    #Breast cancer, metastatic- now with widely metastatic disease  - recently established care at [facility], records in [facility] reviewed- initial dx 1996 R breast cancer s/p surgery, CMF, no endocrine therapy; had local recurrence, treated with endocrine agents  - developed Left breast cancer, s/p surgery, Aromasin  - malignant pleural effusion treated with femara/kisquali, followed by faslodex/ibrance; was following with Dr. Temple, scans in 4/2020 were PERLA, CT 2/2021 with R moderate pleural effusion and adenopathy, bone scan 2/21 with bone lesions  - last faslodex was 6/2019 prior to establishing care at [facility]  - has now been on faslodex, last 7/14; was also started on verzenio but held with low counts  - with cytopenias related to RT and prior treatment limiting options, now with AMS    #neuro, acute AMS- concern for basilar CVA per Neurology  - no plan for further imaging  - on keppra  - mental status improved, for speech/swallow eval    Pt is DNR/DNI, prognosis is guarded  Palliative to eval    d/w NP 69 yo F Catholic, hx of metastatic breast cancer, anemia p/w hematuria, started on CBI, c/b RRT/ code stroke, presumed ischemic CVA    #Anemia, iron deficiency  - pt is JW, does not accept blood products- established by patient per chart review prior to AMS  - received EPO 40, 000 unit 7/28; will hold on giving higher dose therapy (3x/week) as blood sparing treatment due to acute CVA  - s/p IV iron 200mg daily x 5, will continue for 3 further doses  - continue folic acid and IM B12  - LIMIT blood draws- no need for routine CBC; agree with Pediatric tubes  - Hg 3.6 on 7/31; improved 4.7 8/3- monitor periodically    #hematuria-  - US/ CT with no intrauterine source, with + bladder hematoma on CBI, was clamped- restarted due to clots  - prior R nephroureteral catheter with mild residual r hydronephrosis; with soft tissue mass surrounding distal right ureter  - Urology is following  - was redosed tranexamic acid at 10mg/kg dose- 500mg IV over 30 minutes on 7/31 with critically low Hg with bleeding, despite risk of thrombosis  - now off CBI, urine darker, no clots, continue to monitor    #Breast cancer, metastatic- now with widely metastatic disease  - recently established care at [facility], records in [facility] reviewed- initial dx 1996 R breast cancer s/p surgery, CMF, no endocrine therapy; had local recurrence, treated with endocrine agents  - developed Left breast cancer, s/p surgery, Aromasin  - malignant pleural effusion treated with femara/kisquali, followed by faslodex/ibrance; was following with Dr. Temple, scans in 4/2020 were PERLA, CT 2/2021 with R moderate pleural effusion and adenopathy, bone scan 2/21 with bone lesions  - last faslodex was 6/2019 prior to establishing care at [facility]  - has now been on faslodex, last 7/14; was also started on verzenio but held with low counts  - with cytopenias related to RT and prior treatment limiting options, now with AMS    #neuro, acute AMS- concern for basilar CVA per Neurology  - no plan for further imaging  - on keppra  - mental status improved, for speech/swallow eval    #ID- enterococcus UTI/bacteremia  - on IV abx per ID  - for TTE to eval for vegetatiosn    Pt is DNR/DNI, prognosis is guarded  Palliative to eval    d/w NP

## 2021-08-04 DIAGNOSIS — R53.2 FUNCTIONAL QUADRIPLEGIA: ICD-10-CM

## 2021-08-04 DIAGNOSIS — C50.919 MALIGNANT NEOPLASM OF UNSPECIFIED SITE OF UNSPECIFIED FEMALE BREAST: ICD-10-CM

## 2021-08-04 DIAGNOSIS — Z51.5 ENCOUNTER FOR PALLIATIVE CARE: ICD-10-CM

## 2021-08-04 DIAGNOSIS — G93.41 METABOLIC ENCEPHALOPATHY: ICD-10-CM

## 2021-08-04 LAB
APPEARANCE UR: ABNORMAL
BACTERIA # UR AUTO: NEGATIVE — SIGNIFICANT CHANGE UP
BILIRUB UR-MCNC: NEGATIVE — SIGNIFICANT CHANGE UP
COLOR SPEC: SIGNIFICANT CHANGE UP
DIFF PNL FLD: ABNORMAL
EPI CELLS # UR: 4 /HPF — SIGNIFICANT CHANGE UP
GLUCOSE BLDC GLUCOMTR-MCNC: 173 MG/DL — HIGH (ref 70–99)
GLUCOSE BLDC GLUCOMTR-MCNC: 173 MG/DL — HIGH (ref 70–99)
GLUCOSE BLDC GLUCOMTR-MCNC: 269 MG/DL — HIGH (ref 70–99)
GLUCOSE BLDC GLUCOMTR-MCNC: 290 MG/DL — HIGH (ref 70–99)
GLUCOSE BLDC GLUCOMTR-MCNC: 362 MG/DL — HIGH (ref 70–99)
GLUCOSE BLDC GLUCOMTR-MCNC: 362 MG/DL — HIGH (ref 70–99)
GLUCOSE BLDC GLUCOMTR-MCNC: 385 MG/DL — HIGH (ref 70–99)
GLUCOSE UR QL: NEGATIVE — SIGNIFICANT CHANGE UP
HYALINE CASTS # UR AUTO: 4 /LPF — HIGH (ref 0–2)
KETONES UR-MCNC: NEGATIVE — SIGNIFICANT CHANGE UP
LEUKOCYTE ESTERASE UR-ACNC: ABNORMAL
NITRITE UR-MCNC: NEGATIVE — SIGNIFICANT CHANGE UP
OSMOLALITY UR: 278 MOS/KG — LOW (ref 300–900)
PH UR: 7 — SIGNIFICANT CHANGE UP (ref 5–8)
PROT UR-MCNC: 100 — SIGNIFICANT CHANGE UP
RBC CASTS # UR COMP ASSIST: 1517 /HPF — HIGH (ref 0–4)
SP GR SPEC: 1.01 — LOW (ref 1.01–1.02)
UROBILINOGEN FLD QL: NEGATIVE — SIGNIFICANT CHANGE UP
WBC UR QL: 33 /HPF — HIGH (ref 0–5)

## 2021-08-04 PROCEDURE — 51700 IRRIGATION OF BLADDER: CPT

## 2021-08-04 PROCEDURE — 99223 1ST HOSP IP/OBS HIGH 75: CPT | Mod: 25

## 2021-08-04 PROCEDURE — 99232 SBSQ HOSP IP/OBS MODERATE 35: CPT

## 2021-08-04 RX ORDER — INSULIN LISPRO 100/ML
VIAL (ML) SUBCUTANEOUS AT BEDTIME
Refills: 0 | Status: DISCONTINUED | OUTPATIENT
Start: 2021-08-04 | End: 2021-08-06

## 2021-08-04 RX ORDER — SODIUM CHLORIDE 9 MG/ML
1000 INJECTION, SOLUTION INTRAVENOUS
Refills: 0 | Status: DISCONTINUED | OUTPATIENT
Start: 2021-08-04 | End: 2021-08-04

## 2021-08-04 RX ORDER — INSULIN GLARGINE 100 [IU]/ML
5 INJECTION, SOLUTION SUBCUTANEOUS AT BEDTIME
Refills: 0 | Status: DISCONTINUED | OUTPATIENT
Start: 2021-08-04 | End: 2021-08-05

## 2021-08-04 RX ORDER — SODIUM CHLORIDE 9 MG/ML
1000 INJECTION, SOLUTION INTRAVENOUS
Refills: 0 | Status: DISCONTINUED | OUTPATIENT
Start: 2021-08-04 | End: 2021-08-05

## 2021-08-04 RX ORDER — ERYTHROPOIETIN 10000 [IU]/ML
10000 INJECTION, SOLUTION INTRAVENOUS; SUBCUTANEOUS ONCE
Refills: 0 | Status: COMPLETED | OUTPATIENT
Start: 2021-08-04 | End: 2021-08-04

## 2021-08-04 RX ORDER — INSULIN LISPRO 100/ML
2 VIAL (ML) SUBCUTANEOUS
Refills: 0 | Status: DISCONTINUED | OUTPATIENT
Start: 2021-08-04 | End: 2021-08-06

## 2021-08-04 RX ORDER — INSULIN LISPRO 100/ML
VIAL (ML) SUBCUTANEOUS
Refills: 0 | Status: DISCONTINUED | OUTPATIENT
Start: 2021-08-04 | End: 2021-08-05

## 2021-08-04 RX ORDER — SENNA PLUS 8.6 MG/1
2 TABLET ORAL AT BEDTIME
Refills: 0 | Status: DISCONTINUED | OUTPATIENT
Start: 2021-08-04 | End: 2021-08-13

## 2021-08-04 RX ADMIN — Medication 1 MILLIGRAM(S): at 13:20

## 2021-08-04 RX ADMIN — Medication 2 UNIT(S): at 18:01

## 2021-08-04 RX ADMIN — Medication 216 GRAM(S): at 23:08

## 2021-08-04 RX ADMIN — LEVETIRACETAM 400 MILLIGRAM(S): 250 TABLET, FILM COATED ORAL at 22:41

## 2021-08-04 RX ADMIN — LEVETIRACETAM 400 MILLIGRAM(S): 250 TABLET, FILM COATED ORAL at 09:43

## 2021-08-04 RX ADMIN — Medication 216 GRAM(S): at 00:39

## 2021-08-04 RX ADMIN — Medication 216 GRAM(S): at 18:02

## 2021-08-04 RX ADMIN — ERYTHROPOIETIN 10000 UNIT(S): 10000 INJECTION, SOLUTION INTRAVENOUS; SUBCUTANEOUS at 18:02

## 2021-08-04 RX ADMIN — PANTOPRAZOLE SODIUM 40 MILLIGRAM(S): 20 TABLET, DELAYED RELEASE ORAL at 13:20

## 2021-08-04 RX ADMIN — LATANOPROST 1 DROP(S): 0.05 SOLUTION/ DROPS OPHTHALMIC; TOPICAL at 22:41

## 2021-08-04 RX ADMIN — Medication 37.5 MILLIGRAM(S): at 18:02

## 2021-08-04 RX ADMIN — Medication 5: at 13:20

## 2021-08-04 RX ADMIN — IRON SUCROSE 110 MILLIGRAM(S): 20 INJECTION, SOLUTION INTRAVENOUS at 13:21

## 2021-08-04 RX ADMIN — SENNA PLUS 2 TABLET(S): 8.6 TABLET ORAL at 22:41

## 2021-08-04 RX ADMIN — SODIUM CHLORIDE 100 MILLILITER(S): 9 INJECTION, SOLUTION INTRAVENOUS at 08:37

## 2021-08-04 RX ADMIN — SODIUM CHLORIDE 60 MILLILITER(S): 9 INJECTION, SOLUTION INTRAVENOUS at 04:33

## 2021-08-04 RX ADMIN — Medication 3: at 22:42

## 2021-08-04 RX ADMIN — Medication 6: at 00:51

## 2021-08-04 RX ADMIN — Medication 216 GRAM(S): at 05:30

## 2021-08-04 RX ADMIN — Medication 216 GRAM(S): at 13:20

## 2021-08-04 RX ADMIN — BRIMONIDINE TARTRATE 1 DROP(S): 2 SOLUTION/ DROPS OPHTHALMIC at 05:30

## 2021-08-04 RX ADMIN — Medication 3: at 18:02

## 2021-08-04 RX ADMIN — BRIMONIDINE TARTRATE 1 DROP(S): 2 SOLUTION/ DROPS OPHTHALMIC at 18:02

## 2021-08-04 RX ADMIN — HUMAN INSULIN 15 UNIT(S): 100 INJECTION, SUSPENSION SUBCUTANEOUS at 00:54

## 2021-08-04 RX ADMIN — MIRTAZAPINE 15 MILLIGRAM(S): 45 TABLET, ORALLY DISINTEGRATING ORAL at 22:42

## 2021-08-04 RX ADMIN — INSULIN GLARGINE 5 UNIT(S): 100 INJECTION, SOLUTION SUBCUTANEOUS at 22:40

## 2021-08-04 NOTE — PROGRESS NOTE ADULT - ASSESSMENT
71 yo F Tenriism, hx of breast Ca on active chemo (unsure of name) and anemia p/w reported vaginal bleeding/hematuria.     Patient with metastatic breast cancer  Now presenting with severe anemia  Found to have CT A/P with nonenhancing debris in the bladder (clot) and soft tissue lesion around the distal right ureter and evidence of likely metastatic neoplasm.     UCx with Enterococcus faecalis  BCx with Enterococcus faecalis    TTE (8/3) was limited and without vegetations but with severe aortic regurgitation  Need for KIM to be determined if repeat cultures are positive and based on course    Overall, Enterococcus bacteremia, UTI, Fever, GLEN    --Continue Ampicillin to 2g IV Q6H   --Continue to follow CBC with diff  --Continue to follow renal function (Cr/BUN)  --Continue to follow transaminases  --Continue to follow temperature curve  --Follow up on prelim BCx (repeats sent 8/4)    I will continue to follow. Please feel free to contact me with any further questions.    Eric Stewart M.D.  [facility] Division of Infectious Disease  8AM-5PM: Pager Number [phone]  After Hours (or if no response): Please contact the Infectious Diseases Office at [phone]

## 2021-08-04 NOTE — SWALLOW BEDSIDE ASSESSMENT ADULT - SLP PERTINENT HISTORY OF CURRENT PROBLEM
71 yo F Pentec[employer]al, hx of breast Ca on active chemo (unsure of name) and anemia p/w reported vaginal bleeding. Recent admission to [facility] for the same, underwent EPO treatment and discharged. Reported Hgb of 5 during admission. Feeling generally weak, denies chest pain sob. Reports bright red blood in the toilet when she urinates. Denies dysuria, abdominal pain. Patient is unable to receive blood products of any kind.

## 2021-08-04 NOTE — PROGRESS NOTE ADULT - ASSESSMENT
71 yo F Ep[employer], hx of breast Ca on active chemo (unsure of name) and anemia p/w reported vaginal bleeding. Recent admission to [facility] for the same, underwent EPO treatment and discharged. Reported Hgb of 5 during admission. Feeling generally weak, denies chest pain sob. Reports bright red blood in the toilet when she urinates. Denies dysuria, abdominal pain. Patient is unable to receive blood products of any kind.  has been tachycardic.

## 2021-08-04 NOTE — PROGRESS NOTE ADULT - SUBJECTIVE AND OBJECTIVE BOX
Chief Complaint:  FU  History of Present Illness:  pt lethargic, slightly responsive; +weak, no f/c, no cp/dyspnea/cough, no n/v; diaz with pale pink, no clots      MEDICATIONS  (STANDING):  ampicillin  IVPB 2 Gram(s) IV Intermittent every 6 hours  brimonidine 0.2% Ophthalmic Solution 1 Drop(s) Both EYES two times a day  dextrose 40% Gel 15 Gram(s) Oral once  dextrose 5%. 1000 milliLiter(s) (100 mL/Hr) IV Continuous <Continuous>  dextrose 5%. 1000 milliLiter(s) (50 mL/Hr) IV Continuous <Continuous>  dextrose 5%. 1000 milliLiter(s) (100 mL/Hr) IV Continuous <Continuous>  dextrose 50% Injectable 25 Gram(s) IV Push once  dextrose 50% Injectable 12.5 Gram(s) IV Push once  dextrose 50% Injectable 25 Gram(s) IV Push once  folic acid 1 milliGRAM(s) Enteral Tube daily  glucagon  Injectable 1 milliGRAM(s) IntraMuscular once  insulin glargine Injectable (LANTUS) 5 Unit(s) SubCutaneous at bedtime  insulin lispro (ADMELOG) corrective regimen sliding scale   SubCutaneous three times a day before meals  insulin lispro (ADMELOG) corrective regimen sliding scale   SubCutaneous at bedtime  insulin lispro Injectable (ADMELOG) 2 Unit(s) SubCutaneous three times a day before meals  latanoprost 0.005% Ophthalmic Solution 1 Drop(s) Both EYES at bedtime  levETIRAcetam  IVPB 500 milliGRAM(s) IV Intermittent every 12 hours  mirtazapine 15 milliGRAM(s) Oral at bedtime  pantoprazole  Injectable 40 milliGRAM(s) IV Push daily  venlafaxine 37.5 milliGRAM(s) Oral every 12 hours    MEDICATIONS  (PRN):  acetaminophen   Tablet .. 650 milliGRAM(s) Oral every 6 hours PRN Temp greater or equal to 38C (100.4F), Mild Pain (1 - 3)  artificial  tears Solution 1 Drop(s) Both EYES four times a day PRN Dry Eyes  oxyCODONE    IR 5 milliGRAM(s) Oral every 4 hours PRN Moderate Pain (4 - 6)      Allergies    No Known Allergies    Intolerances        Vital Signs Last 24 Hrs  T(C): 36.7 (04 Aug 2021 13:18), Max: 37.2 (03 Aug 2021 21:06)  T(F): 98 (04 Aug 2021 13:18), Max: 99 (03 Aug 2021 21:06)  HR: 105 (04 Aug 2021 13:18) (105 - 110)  BP: 138/74 (04 Aug 2021 13:18) (138/62 - 145/74)  BP(mean): --  RR: 17 (04 Aug 2021 13:18) (17 - 18)  SpO2: 97% (04 Aug 2021 13:18) (97% - 99%)    PHYSICAL EXAM  General: adult in NAD, weak appearing  HEENT: no NGT, dry mouth  CV: normal S1/S2 with no murmur rubs or gallops  Lungs: decreased BS  Abdomen: soft non-tender non-distended, positive bowel sounds  Ext: no clubbing cyanosis or edema  Skin: no rashes and no petechiae  Lymph Nodes: No LAD in neck  Neuro: lethargic    LABS:                          4.7    4.63  )-----------( 153      ( 03 Aug 2021 08:17 )             16.4         Mean Cell Volume : 125.2 fl  Mean Cell Hemoglobin : 35.9 pg  Mean Cell Hemoglobin Concentration : 28.7 gm/dL  Auto Neutrophil # : x  Auto Lymphocyte # : x  Auto Monocyte # : x  Auto Eosinophil # : x  Auto Basophil # : x  Auto Neutrophil % : x  Auto Lymphocyte % : x  Auto Monocyte % : x  Auto Eosinophil % : x  Auto Basophil % : x      Serial CBC's  08-03 @ 08:17  Hct-16.4 / Hgb-4.7 / Plat-153 / RBC-1.31 / WBC-4.63      08-03    159<H>  |  121<H>  |  51<H>  ----------------------------<  319<H>  3.7   |  25  |  1.50<H>    Ca    9.7      03 Aug 2021 08:17

## 2021-08-04 NOTE — CHART NOTE - NSCHARTNOTEFT_GEN_A_CORE
Nutrition Follow Up Note  Patient seen for: malnutrition follow up on [facility]    Chart reviewed, events noted.  "IMM; bladder mass/hematuria s/p CBI,  bacteremia on IV abx, hyperNa on IVF, Episcopal w/ anemia of 3-4, GOC ongoing"    Source: [] Patient       [x] EMR        [x] RN        [] Family at bedside       [] Other:    -If unable to interview patient: [] Trach/Vent/BiPAP  [x] Disoriented/confused/inappropriate to interview  pt in enhanced room    Diet Order:   Diet, NPO with Tube Feed:   Tube Feeding Modality: Nasogastric  Glucerna 1.2 Mike (GLUCERNARTH)  Total Volume for 24 Hours (mL): 1200  Continuous  Starting Tube Feed Rate {mL per Hour}: 10  Increase Tube Feed Rate by (mL): 10     Every 6 hours  Until Goal Tube Feed Rate (mL per Hour): 50  Tube Feed Duration (in Hours): 24  Tube Feed Start Time: 16:00 (21)    - Is current order appropriate/adequate? [] Yes  [x]  No:   pt currently NPO-pulled out NGT overnight-plan for SLP today        - Nutrition-related concerns: pt was on tube feeds. tube feeds on hold at this time    pt seen by SLP []Yes []No-plan for today as per nurse    GI:  Last BM ___.   Bowel Regimen? [] Yes   [x] No      Weights:   Daily Weight in k.2 (), Weight in k (), Weight in k.8 ()    Nutritionally Pertinent MEDICATIONS  (STANDING):  ampicillin  IVPB  dextrose 40% Gel  dextrose 5%.  dextrose 5%.  dextrose 5%.  dextrose 50% Injectable  dextrose 50% Injectable  dextrose 50% Injectable  folic acid  glucagon  Injectable  insulin lispro (ADMELOG) corrective regimen sliding scale  insulin NPH human recombinant  iron sucrose IVPB  pantoprazole  Injectable    Pertinent Labs:   A1C with Estimated Average Glucose Result: 6.5 % (21 @ 09:21)    Finger Sticks:  POCT Blood Glucose.: 173 mg/dL ( @ 06:11)  POCT Blood Glucose.: 173 mg/dL ( @ 04:01)  POCT Blood Glucose.: 269 mg/dL ( @ 00:30)  POCT Blood Glucose.: 264 mg/dL ( @ 17:38)  POCT Blood Glucose.: 358 mg/dL ( @ 12:22)      Pressure Injuries as per nursing documentation: none  Edema: +4 right arm    Estimated Needs:   [x] no change since previous assessment  [] recalculated:     Previous Nutrition Diagnosis: moderate malnutrition  Nutrition Diagnosis is: [x] ongoing  [] resolved [] not applicable     New Nutrition Diagnosis: [x] Not applicable    Nutrition Care Plan:  [] In Progress  [x] Achieved  [] Not applicable    Nutrition Interventions:     Education Provided:       [] Yes:  [x] No:   pt was educated on the importance of supplements to increase calorie and protein intake in light of RD's nutritional findings []Yes [x]N/A         Recommendations:         [x] Reinitiate nutrition via appropriate route     [] Change diet to:      [] continue oral nutrition supplement:        [] Add micronutrient supplementation:      [x] Continue current micronutrient supplementation: folic acid       []Discussed recommendations with provider     [] Needed to escalate to provider     []Placed pending verification with NP/PA      []Placed pending verification with Team      []Placed sticker (malnutrition/BMI/underweight)     []Recommend swallow evaluation     [] monitor need for diet ed reinforcement     [x] Other: monitor result of swallow eval    Monitoring and Evaluation:   Continue to monitor nutritional intake, tolerance to diet prescription, weights, labs, skin integrity      RD remains available upon request and will follow up per protocol  Ximena Rojas MA, RD, CDN #[phone]

## 2021-08-04 NOTE — PROGRESS NOTE ADULT - ASSESSMENT
71 yo Female Jehovah witness with PMHx of breast CA s/p BL mastectomy with recurrence and metastasis here with c/o vaginal bleeding, found to have bladder hematoma on pelvic ultrasound. Pt also with R sided nephU with mild hydro, but pt unsure indication for nephU.   on admission: H/H 4.3/13.5, SCr 1.37.  Pt follows up at [facility] and is currently receiving chemotherapy.     7/28: Hct 11.2 from 13.5 yesterday. ptn is pale, lethargic, dyspneic, expresses wishes not to have any blood products based on her GD DOV. ptn placed her proxy on the phone Mr. Gray who wanted to confirm the hospital is respecting ptn's wishes. both ptn and the proxy aksed baout being transferred to [facility]. however ptn is not stable w HGB of 3 to be travelling. denies CP, palps, HA, has ongoing hematuria, CBI in place. Ct C/A/P reviewed. ptn w metastatic dz process on CT scan. Heme consult called. will cont CBI as per . pending CT urogram. cont trending HH. started on IV IRON. prognosis is guarded.   7/29: ptn found unresponsive with a droop, fever, ongoing hematuria, had filled out a MOLST form , she is DNR/DNI, code stroke called, will give Abx, keep temp down w cooling blanket, since ptn cannot receive anticoagulation/is dnr/dni will not get CTA of brain, will place on KEPPRA for sz, get eeg, prognosis is grave. this was d/w ,Mr Gray ptn's proxy. MICU consult reviewed. prognosis is grave  7/30:   ptn is morwe responsive today though minimally, has enterococcal bacteremia. seen by ID , placed on IV AMPICILLIN while awaiting sensitivities. rpt blood cx sent. will check HH in am. all blood draws should be done in pediatric [facility] . GLEN, ongoing 2/2 ischemic ATN, hematuria improving  7/31:  little more responsive today, has hypernatremia, will start free water via NGT. has clots in CBI. rpt hgb3.6, ptn is hyperglycemic. will call endo  8/1: ptn is lethargic, hematuria has improved, now off  CBI, ongoing GLEN and hypernatremia though improved, heme, renal  following. Hyperglycemia, will dc d5W drip, place on standing LANTUS , cont Admelog on a sliding scale, Dr. Guthrie from [facility] called. increase free water intake to 250 q4H per NGT. rpt cbc and BMP in am via pediatric tubes. tranexamic acid, EPO, IV iron, folic acid and B12 as per heme  8/2: awake, alert, responds appropriately. on and off confusion, diaz in place w hematuria, no clots. off CBI.  ptn refused Blood work today. on NGT feeds w free water per NGT, hyperglycemia is ongoing, not controlled, endo on board. will DC IVF  8/3: ptn more awake and alert. on and off confusion. HH improving. heme following, diaz w pink urine. hypernatremia worsening despite FREE water via NGT  8/4: pulled NGT. will need to replace. ptn had filled out a MOLST form prior to arrival. no artifical feeds. remain NPO 2/2 failed swallow study. check HH and Na tomorrow. on IVF. getting procrit today. has pink urine in diaz

## 2021-08-04 NOTE — SWALLOW BEDSIDE ASSESSMENT ADULT - SLP GENERAL OBSERVATIONS
Pt encountered in bed, awake, on room air. +B/l mittens. Oriented to self and current month; not oriented to current year or place, [place] Able to follow simple directives. Vocal quality WFL.

## 2021-08-04 NOTE — PROGRESS NOTE ADULT - SUBJECTIVE AND OBJECTIVE BOX
DIABETES FOLLOW UP NOTE: Saw pt earlier today  INTERVAL HX: Pt stable, per staff pt pulled out NGT this am and  passed swallowing test so starting soft consistent carb diet. BG 100s to 200s while on TFs. No hypoglycemia. Pt answered simple questions but is very forgetful and gets easily confused with questions. Able to answer yes/no to questions. Started antibiotic for enterococcus bacteremia, Discontinued NPH insulin regimen this am since pt is now off TFs      Review of Systems:  General: As above  Cardiovascular: No chest pain, palpitations  Respiratory: No SOB, no cough  GI: No nausea, vomiting, abdominal pain  Endocrine: no polyuria, polydipsia or S&Sx of hypoglycemia    Allergies    No Known Allergies    Intolerances      MEDICATIONS:   ampicillin  IVPB 2 Gram(s) IV Intermittent every 6 hours  insulin glargine Injectable (LANTUS) 5 Unit(s) SubCutaneous at bedtime  insulin lispro (ADMELOG) corrective regimen sliding scale   SubCutaneous three times a day before meals  insulin lispro (ADMELOG) corrective regimen sliding scale   SubCutaneous at bedtime      PHYSICAL EXAM:  VITALS: T(C): 36.7 (08-04-21 @ 13:18)  T(F): 98 (08-04-21 @ 13:18), Max: 99 (08-03-21 @ 21:06)  HR: 105 (08-04-21 @ 13:18) (105 - 110)  BP: 138/74 (08-04-21 @ 13:18) (138/62 - 145/74)  RR:  (17 - 18)  SpO2:  (97% - 99%)  Wt(kg): --  GENERAL: Thin female laying in bed in NAD  Abdomen: Soft, nontender, non distended  Extremities: Warm, no edema in all 4 exts  NEURO: Alert, forgetful and gets easily confused    LABS:  POCT Blood Glucose.: 362 mg/dL (08-04-21 @ 12:31)  POCT Blood Glucose.: 173 mg/dL (08-04-21 @ 06:11)  POCT Blood Glucose.: 173 mg/dL (08-04-21 @ 04:01)  POCT Blood Glucose.: 269 mg/dL (08-04-21 @ 00:30)  POCT Blood Glucose.: 264 mg/dL (08-03-21 @ 17:38)  POCT Blood Glucose.: 358 mg/dL (08-03-21 @ 12:22)  POCT Blood Glucose.: 361 mg/dL (08-03-21 @ 05:54)  POCT Blood Glucose.: 364 mg/dL (08-02-21 @ 23:23)  POCT Blood Glucose.: 415 mg/dL (08-02-21 @ 17:47)  POCT Blood Glucose.: 370 mg/dL (08-02-21 @ 11:52)  POCT Blood Glucose.: 300 mg/dL (08-02-21 @ 05:07)  POCT Blood Glucose.: 306 mg/dL (08-01-21 @ 23:05)  POCT Blood Glucose.: 354 mg/dL (08-01-21 @ 21:40)  POCT Blood Glucose.: 396 mg/dL (08-01-21 @ 20:01)  POCT Blood Glucose.: 429 mg/dL (08-01-21 @ 18:32)  POCT Blood Glucose.: 428 mg/dL (08-01-21 @ 18:31)  POCT Blood Glucose.: 457 mg/dL (08-01-21 @ 17:07)  POCT Blood Glucose.: 449 mg/dL (08-01-21 @ 17:06)                            4.7    4.63  )-----------( 153      ( 03 Aug 2021 08:17 )             16.4       08-03    159<H>  |  121<H>  |  51<H>  ----------------------------<  319<H>  3.7   |  25  |  1.50<H>      EGFR if non : 35<L>    Ca    9.7      08-03        Thyroid Function Tests:  07-29 @ 19:56 TSH 2.45 FreeT4 -- T3 -- Anti TPO -- Anti Thyroglobulin Ab -- TSI --      A1C with Estimated Average Glucose Result: 6.5 % (07-28-21 @ 09:21) + anemia      Estimated Average Glucose: 140 mg/dL (07-28-21 @ 09:21)

## 2021-08-04 NOTE — PROGRESS NOTE ADULT - SUBJECTIVE AND OBJECTIVE BOX
Patient is a 70y old  Female who presents with a chief complaint of severe anemia, acute blood loss 2/2 hematuria (04 Aug 2021 16:46)      SUBJECTIVE / OVERNIGHT EVENTS: spoke to Mr Gray about ptns progress. ptn remains on and off confused. pulled her NGT. will replace. MOLST form on chart. doesnt want artificial feeds    MEDICATIONS  (STANDING):  ampicillin  IVPB 2 Gram(s) IV Intermittent every 6 hours  brimonidine 0.2% Ophthalmic Solution 1 Drop(s) Both EYES two times a day  dextrose 40% Gel 15 Gram(s) Oral once  dextrose 5%. 1000 milliLiter(s) (100 mL/Hr) IV Continuous <Continuous>  dextrose 5%. 1000 milliLiter(s) (50 mL/Hr) IV Continuous <Continuous>  dextrose 5%. 1000 milliLiter(s) (100 mL/Hr) IV Continuous <Continuous>  dextrose 50% Injectable 25 Gram(s) IV Push once  dextrose 50% Injectable 12.5 Gram(s) IV Push once  dextrose 50% Injectable 25 Gram(s) IV Push once  folic acid 1 milliGRAM(s) Enteral Tube daily  glucagon  Injectable 1 milliGRAM(s) IntraMuscular once  insulin glargine Injectable (LANTUS) 5 Unit(s) SubCutaneous at bedtime  insulin lispro (ADMELOG) corrective regimen sliding scale   SubCutaneous three times a day before meals  insulin lispro (ADMELOG) corrective regimen sliding scale   SubCutaneous at bedtime  insulin lispro Injectable (ADMELOG) 2 Unit(s) SubCutaneous three times a day before meals  latanoprost 0.005% Ophthalmic Solution 1 Drop(s) Both EYES at bedtime  levETIRAcetam  IVPB 500 milliGRAM(s) IV Intermittent every 12 hours  mirtazapine 15 milliGRAM(s) Oral at bedtime  pantoprazole  Injectable 40 milliGRAM(s) IV Push daily  senna 2 Tablet(s) Oral at bedtime  venlafaxine 37.5 milliGRAM(s) Oral every 12 hours    MEDICATIONS  (PRN):  acetaminophen   Tablet .. 650 milliGRAM(s) Oral every 6 hours PRN Temp greater or equal to 38C (100.4F), Mild Pain (1 - 3)  artificial  tears Solution 1 Drop(s) Both EYES four times a day PRN Dry Eyes  oxyCODONE    IR 5 milliGRAM(s) Oral every 4 hours PRN Moderate Pain (4 - 6)      Vital Signs Last 24 Hrs  T(F): 97.8 (21 @ 20:44), Max: 98.2 (21 @ 05:43)  HR: 102 (21 @ 20:44) (102 - 110)  BP: 133/74 (21 @ 20:44) (133/74 - 138/74)  RR: 17 (21 @ 20:44) (17 - 18)  SpO2: 96% (21 @ 20:44) (96% - 98%)  Telemetry:   CAPILLARY BLOOD GLUCOSE      POCT Blood Glucose.: 385 mg/dL (04 Aug 2021 21:05)  POCT Blood Glucose.: 290 mg/dL (04 Aug 2021 17:11)  POCT Blood Glucose.: 362 mg/dL (04 Aug 2021 12:31)  POCT Blood Glucose.: 173 mg/dL (04 Aug 2021 06:11)  POCT Blood Glucose.: 173 mg/dL (04 Aug 2021 04:01)  POCT Blood Glucose.: 269 mg/dL (04 Aug 2021 00:30)    I&O's Summary    03 Aug 2021 07:01  -  04 Aug 2021 07:00  --------------------------------------------------------  IN: 720 mL / OUT: 1150 mL / NET: -430 mL    04 Aug 2021 07:01  -  04 Aug 2021 21:15  --------------------------------------------------------  IN: 140 mL / OUT: 1200 mL / NET: -1060 mL        PHYSICAL EXAM:  GENERAL: NAD, well-developed  HEAD:  Atraumatic, Normocephalic  EYES: EOMI, PERRLA, conjunctiva and sclera clear  NECK: Supple, No JVD  CHEST/LUNG: Clear to auscultation bilaterally; No wheeze  HEART: Regular rate and rhythm; No murmurs, rubs, or gallops  ABDOMEN: Soft, Nontender, Nondistended; Bowel sounds present  EXTREMITIES:  2+ Peripheral Pulses, No clubbing, cyanosis, or edema  PSYCH: AAOx3  NEUROLOGY: non-focal  SKIN: No rashes or lesions    LABS:                        4.7    4.63  )-----------( 153      ( 03 Aug 2021 08:17 )             16.4     08-03    159<H>  |  121<H>  |  51<H>  ----------------------------<  319<H>  3.7   |  25  |  1.50<H>    Ca    9.7      03 Aug 2021 08:17            Urinalysis Basic - ( 04 Aug 2021 10:02 )    Color: BROWN / Appearance: Slightly Turbid / S.008 / pH: x  Gluc: x / Ketone: Negative  / Bili: Negative / Urobili: Negative   Blood: x / Protein: 100 / Nitrite: Negative   Leuk Esterase: Large / RBC: 1517 /hpf / WBC 33 /HPF   Sq Epi: x / Non Sq Epi: 4 /hpf / Bacteria: Negative        RADIOLOGY & ADDITIONAL TESTS:    Imaging Personally Reviewed:    Consultant(s) Notes Reviewed:      Care Discussed with Consultants/Other Providers:

## 2021-08-04 NOTE — CONSULT NOTE ADULT - PROBLEM SELECTOR RECOMMENDATION 2
Iron transfusion   heme consulted
- BP above goal, goal BP < 130/80  - management as per primary team
pt requiring full supportive care

## 2021-08-04 NOTE — PROGRESS NOTE ADULT - SUBJECTIVE AND OBJECTIVE BOX
UROLOGY  PROGRESS NOTE:     Subjective: Urology called to reassess urine color. Urine was describes as clear yesterday and darker red today. Diaz draining well otherwise and CBI has been held. Irrigated diaz with NS, no clot return and color remained clear pink.     Objective:  Vital signs  T(F): , Max: 98.2 (08-04-21 @ 05:43)  HR: 102 (08-04-21 @ 20:44)  BP: 133/74 (08-04-21 @ 20:44)  SpO2: 96% (08-04-21 @ 20:44)  Wt(kg): --    I&O's Summary    03 Aug 2021 07:01  -  04 Aug 2021 07:00  --------------------------------------------------------  IN: 720 mL / OUT: 1150 mL / NET: -430 mL    04 Aug 2021 07:01  -  04 Aug 2021 22:57  --------------------------------------------------------  IN: 0 mL / OUT: 2700 mL / NET: -2700 mL    Gen: NAD  Abd: Soft, NT, ND  : CBI clamped, urine is clear pink     Labs:  08-03  4.63  / 16.4  /1.50                           4.7    4.63  )-----------( 153      ( 03 Aug 2021 08:17 )             16.4     08-03    159<H>  |  121<H>  |  51<H>  ----------------------------<  319<H>  3.7   |  25  |  1.50<H>    Ca    9.7      03 Aug 2021 08:17

## 2021-08-04 NOTE — PROGRESS NOTE ADULT - ASSESSMENT
69 y/o F, Yarsani, w/h/o T2DM> HbA1c 6.5% (Skewed due to severe anemia) . DM c/b neuropath, CKD. Also h/o breast Ca on chemo (unsure of name) anemia, HTN, HLD/ Here with severe anemia, acute blood loss 2/2 hematuria> unable ot receive blood. Consult called for management of hyperglycemia while on continuous TFs. However, pt pulled out NGT and now passed swallowing test. BG >300s ac lunch after pt given food for swallowing test. Discontinued NPH insulin today. Will start low dose basal/bolus insulin and adjust to BG goal 100-180 mg/dL.  71 y/o F, [employer], w/h/o T2DM> HbA1c 6.5% (Skewed due to severe anemia) . DM c/b neuropath, CKD. Also h/o breast Ca on chemo (unsure of name) anemia, HTN, HLD/ Here with severe anemia, acute blood loss 2/2 hematuria> unable ot receive blood. Consult called for management of hyperglycemia while on continuous TFs. However, pt pulled out NGT and now passed swallowing test. BG >300s ac lunch after pt given food for swallowing test. Discontinued NPH insulin today. Will start low dose basal/bolus insulin and adjust to BG goal 100-180 mg/dL.   Pt also starting D5W 100cc/h for hypernatremia expecting BG to go up

## 2021-08-04 NOTE — CONSULT NOTE ADULT - SUBJECTIVE AND OBJECTIVE BOX
HPI:  69 yo F Hoahaoism, hx of breast Ca on active chemo (unsure of name) and anemia p/w reported vaginal bleeding. Recent admission to [facility] for the same, underwent EPO treatment and discharged. Reported Hgb of 5 during admission. Feeling generally weak, denies chest pain sob. Reports bright red blood in the toilet when she urinates. Denies dysuria, abdominal pain. Patient is unable to receive blood products of any kind. (2021 21:01)    PERTINENT PM/SXH:   Anemia      No significant past surgical history      FAMILY HISTORY:    ITEMS NOT CHECKED ARE NOT PRESENT    SOCIAL HISTORY:   Significant other/partner[x ]  Children x1 dtr with special needs [ ]  Sabianism/Spirituality:  Substance hx:  [ ]   Tobacco hx:  [ ]   Alcohol hx: [ ]   Home Opioid hx:  [ ] I-Stop Reference No:  Living Situation: [x ]Home with friend Mary Olson [ ]Long term care  [ ]Rehab [ ]Other    ADVANCE DIRECTIVES:    DNR    MOLST  x[ ]    Living Will  [ ]     DECISION MAKER(s):  [x ] Health Care Proxy(s)  [ ] Surrogate(s)  [ ] Guardian           Name(s): Phone Number(s): Hipolito Gray [phone]    BASELINE (I)ADL(s) (prior to admission):  [place]: [ ]Total  [ ] Moderate [x ]Dependent    Allergies    No Known Allergies    Intolerances    MEDICATIONS  (STANDING):  ampicillin  IVPB 2 Gram(s) IV Intermittent every 6 hours  brimonidine 0.2% Ophthalmic Solution 1 Drop(s) Both EYES two times a day  dextrose 40% Gel 15 Gram(s) Oral once  dextrose 5%. 1000 milliLiter(s) (100 mL/Hr) IV Continuous <Continuous>  dextrose 5%. 1000 milliLiter(s) (50 mL/Hr) IV Continuous <Continuous>  dextrose 5%. 1000 milliLiter(s) (100 mL/Hr) IV Continuous <Continuous>  dextrose 50% Injectable 25 Gram(s) IV Push once  dextrose 50% Injectable 12.5 Gram(s) IV Push once  dextrose 50% Injectable 25 Gram(s) IV Push once  folic acid 1 milliGRAM(s) Enteral Tube daily  glucagon  Injectable 1 milliGRAM(s) IntraMuscular once  insulin glargine Injectable (LANTUS) 5 Unit(s) SubCutaneous at bedtime  insulin lispro (ADMELOG) corrective regimen sliding scale   SubCutaneous three times a day before meals  insulin lispro (ADMELOG) corrective regimen sliding scale   SubCutaneous at bedtime  iron sucrose IVPB 200 milliGRAM(s) IV Intermittent every 24 hours  latanoprost 0.005% Ophthalmic Solution 1 Drop(s) Both EYES at bedtime  levETIRAcetam  IVPB 500 milliGRAM(s) IV Intermittent every 12 hours  mirtazapine 15 milliGRAM(s) Oral at bedtime  pantoprazole  Injectable 40 milliGRAM(s) IV Push daily  venlafaxine 37.5 milliGRAM(s) Oral every 12 hours    MEDICATIONS  (PRN):  acetaminophen   Tablet .. 650 milliGRAM(s) Oral every 6 hours PRN Temp greater or equal to 38C (100.4F), Mild Pain (1 - 3)  artificial  tears Solution 1 Drop(s) Both EYES four times a day PRN Dry Eyes  oxyCODONE    IR 5 milliGRAM(s) Oral every 4 hours PRN Moderate Pain (4 - 6)    PRESENT SYMPTOMS: [x ]Unable to obtain due to poor mentation   Source if other than patient:  [ ]Family   [ ]Team     Pain: [ ]yes [ ]no  QOL impact -   Location -                    Aggravating factors -  Quality -  Radiation -  Timing-  Severity (0-10 scale):  Minimal acceptable level (0-10 scale):     PAIN AD Score:     http://geriatrictoolkit.[facility]/cog/painad.pdf (press ctrl +  left click to view)    Dyspnea:                           [ ]Mild [ ]Moderate [ ]Severe  Anxiety:                             [ ]Mild [ ]Moderate [ ]Severe  Fatigue:                             [ ]Mild [x ]Moderate [ ]Severe  Nausea:                            [ ]Mild [ ]Moderate [ ]Severe  Loss of appetite:               [ ]Mild [x ]Moderate [ ]Severe  Constipation:                    [ ]Mild [ ]Moderate [ ]Severe    Other Symptoms:  [ ]All other review of systems negative     Palliative Performance Status Version 2:      30   %    http://npcrc.org/files/news/palliative_performance_scale_ppsv2.pdf  PHYSICAL EXAM:  Vital Signs Last 24 Hrs  T(C): 36.8 (04 Aug 2021 05:43), Max: 37.2 (03 Aug 2021 21:06)  T(F): 98.2 (04 Aug 2021 05:43), Max: 99 (03 Aug 2021 21:06)  HR: 110 (04 Aug 2021 05:43) (108 - 110)  BP: 138/62 (04 Aug 2021 05:43) (138/62 - 145/74)  BP(mean): --  RR: 18 (04 Aug 2021 05:43) (18 - 18)  SpO2: 98% (04 Aug 2021 05:43) (98% - 99%) I&O's Summary    03 Aug 2021 07:01  -  04 Aug 2021 07:00  --------------------------------------------------------  IN: 720 mL / OUT: 1150 mL / NET: -430 mL    04 Aug 2021 07:01  -  04 Aug 2021 12:14  --------------------------------------------------------  IN: 0 mL / OUT: 800 mL / NET: -800 mL      GENERAL:  [ ]Alert  [ ]Oriented x   [x]Lethargic  [ ]Cachexia  [ ]Unarousable  [ ]Verbal  [ x]Non-Verbal    Behavioral:   [ ] Anxiety  [x ] Delirium [ x] Agitation [ ] Other    HEENT:  [ ]Normal   [x ]Dry mouth   [ ]ET Tube/Trach  [ ]Oral lesions    PULMONARY:   [ ]xClear [ ]Tachypnea  [ ]Audible excessive secretions   [ ]Rhonchi        [ ]Right [ ]Left [ ]Bilateral  [ ]Crackles        [ ]Right [ ]Left [ ]Bilateral  [ ]Wheezing     [ ]Right [ ]Left [ ]Bilatera  [ ]Diminished breath sounds [ ]right [ ]left [ ]bilateral    CARDIOVASCULAR:    [ ]Regular [ ]Irregular [ x]Tachy  [ ]Arturo [ ]Murmur [ ]Other    GASTROINTESTINAL:  [x ]Soft  [ x]Distended   [ x]+BS  [ ]Non tender [ ]Tender  [ ]PEG [ ]OGT/ NGT  Last BM:     GENITOURINARY:  [ ]Normal [x ] Incontinent   [ ]Oliguria/Anuria   [ ]Contreras    [ ]External cath    MUSCULOSKELETAL:   [ ]Normal   [x ]Weakness  [x ]Bed/Wheelchair bound [ ]Edema    NEUROLOGIC:   [ ]No focal deficits  [ ]Cognitive impairment  [ ]Dysphagia [ ]Dysarthria [ ]Paresis [ ]Other     SKIN:   [x ]Normal    [ ]Rash  [ ]Pressure ulcer(s)       Present on admission [ ]y [ ]n    CRITICAL CARE:  [ ]Shock Present  [ ]Septic [ ]Cardiogenic [ ]Neurologic [ ]Hypovolemic  [ ]  Vasopressors [ ]  Inotropes   [ ]Respiratory failure present [ ]Mechanical ventilation [ ]Non-invasive ventilatory support [ ]High flow  [ ]Acute  [ ]Chronic [ ]Hypoxic  [ ]Hypercarbic [ ]Other  [ ]Other organ failure     LABS:                        4.7    4.63  )-----------( 153      ( 03 Aug 2021 08:17 )             16.4   08-03    159<H>  |  121<H>  |  51<H>  ----------------------------<  319<H>  3.7   |  25  |  1.50<H>    Ca    9.7      03 Aug 2021 08:17        Urinalysis Basic - ( 04 Aug 2021 10:02 )    Color: BROWN / Appearance: Slightly Turbid / S.008 / pH: x  Gluc: x / Ketone: Negative  / Bili: Negative / Urobili: Negative   Blood: x / Protein: 100 / Nitrite: Negative   Leuk Esterase: Large / RBC: x / WBC x   Sq Epi: x / Non Sq Epi: x / Bacteria: x      RADIOLOGY & ADDITIONAL STUDIES:    < from: CT Head No Cont (21 @ 16:16) >  EXAM:  CT BRAIN                            PROCEDURE DATE:  2021            INTERPRETATION:  INDICATIONS:  r/o stroke code stroke.    TECHNIQUE:  Serial axial images were obtained from the skull base to the vertex without intravenous contrast. Sagittal and Coronal reformats were performed    COMPARISON EXAMINATION: None.    FINDINGS:  VENTRICLES AND SULCI: Mild involutional changes given the patient's age  INTRA-AXIAL:  Microvascular ischemic changes involving the periventricular and subcortical white matter age indeterminate.  EXTRA-AXIAL:  No mass or collection is seen.  VISUALIZED SINUSES:  Clear.  VISUALIZED MASTOIDS:  Clear.  CALVARIUM:  Normal.  MISCELLANEOUS:  None.    IMPRESSION:  No intracranial hemorrhage    Dr. Garcia discussed these findings with Dr. Rodgers on 2021 4:17 PM with read back.    --- End of Report ---                SRINIVASA GARCIA MD; Attending Radiologist  This document has been electronically signed. 2021  4:20PM    < end of copied text >      PROTEIN CALORIE MALNUTRITION PRESENT: [ ]mild [ ]moderate [ ]severe [ ]underweight [ ]morbid obesity  https://www.andeal.org/vault/2440/web/files/ONC/Table_Clinical%20Characteristics%20to%20Document%20Malnutrition-White%20JV%20et%20al%2020.pdf    Height (cm): 149.9 (21 @ 14:35)  Weight (kg): 48.1 (21 @ 14:35)  BMI (kg/m2): 21.4 (21 @ 14:35)    [ ]PPSV2 < or = to 30% [ ]significant weight loss  [ ]poor nutritional intake  [ ]anasarca      [ ]Artificial Nutrition      REFERRALS:   [ ]Chaplaincy  [ ]Hospice  [ ]Child Life  [ ]Social Work  [ ]Case management [ ]Holistic Therapy     Goals of Care Document:

## 2021-08-04 NOTE — PROGRESS NOTE ADULT - ASSESSMENT
69 yo F Yarsanism, hx of metastatic breast cancer, anemia p/w hematuria, started on CBI, c/b RRT/ code stroke, presumed ischemic CVA    #Anemia, iron deficiency  - pt is JW, does not accept blood products- established by patient per chart review prior to AMS  - received EPO 40, 000 unit 7/28; will hold on giving higher dose therapy (3x/week) as blood sparing treatment due to acute CVA  - s/p IV iron 200mg daily x 6, will continue for 2 further doses  - continue folic acid and IM B12  - LIMIT blood draws- no need for routine CBC; agree with Pediatric tubes  - will give one dose of procrit 10,000 SQ today  - Hg 3.6 on 7/31; improved 4.7 8/3- monitor periodically    #hematuria-  - US/ CT with no intrauterine source, with + bladder hematoma on CBI, was clamped- restarted due to clots  - prior R nephroureteral catheter with mild residual r hydronephrosis; with soft tissue mass surrounding distal right ureter  - Urology is following  - was redosed tranexamic acid at 10mg/kg dose- 500mg IV over 30 minutes on 7/31 with critically low Hg with bleeding, despite risk of thrombosis  - now off CBI, urine darker, no clots, continue to monitor    #Breast cancer, metastatic- now with widely metastatic disease  - recently established care at [facility], records in [facility] reviewed- initial dx 1996 R breast cancer s/p surgery, CMF, no endocrine therapy; had local recurrence, treated with endocrine agents  - developed Left breast cancer, s/p surgery, Aromasin  - malignant pleural effusion treated with femara/kisquali, followed by faslodex/ibrance; was following with Dr. 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

## 2021-08-04 NOTE — CHART NOTE - NSCHARTNOTEFT_GEN_A_CORE
MEDICINE NP    SIMA WILL  70y Female    Patient is a 70y old  Female who presents with a chief complaint of severe anemia, acute blood loss 2/2 hematuria (03 Aug 2021 20:20)       > Event Summary:  Notified by RN, Patient with pulled -out NGT.  Also attempting to pull @ diaz and IVL  Patient seen at bedside, awake, alert, and cooperative. NAD   Multiple attempts made to replace NGT without success.   -Will hold off further NGT placement attempts for now and f/u in AM pending Speech/Swallow eval.   -Hold NPH @ 6AM while NPO   -IVF changed to D51/2NS while NPO  -C/w BG Q6hrs  -Mittens b/l unsecured placed and re-evaluate per protocol   -Will endorse to day provider in AM  & Attending to follow       -Vital Signs Last 24 Hrs  T(C): 37.2 (03 Aug 2021 21:06), Max: 37.2 (03 Aug 2021 21:06)  T(F): 99 (03 Aug 2021 21:06), Max: 99 (03 Aug 2021 21:06)  HR: 108 (03 Aug 2021 21:06) (108 - 111)  BP: 145/74 (03 Aug 2021 21:06) (145/74 - 168/80)  RR: 18 (03 Aug 2021 21:06) (18 - 18)  SpO2: 99% (03 Aug 2021 21:06) (98% - 99%)        LISA Cross-BC  Medicine Department  #[id number]

## 2021-08-04 NOTE — CONSULT NOTE ADULT - PROBLEM SELECTOR RECOMMENDATION 3
urology following
- can check lipid panel in AM and consider statin if LDL > 70 and it is consistent with patient's goals of care    Rafaela Kaiser MD   Pager # [phone]  On evenings and weekends, please call the office at [phone] or page endocrine fellow on call. Please note that this patient may be followed by different provider tomorrow. If no answer, contact the office.
pt refusing transfusions 2/2 to Mormonism beliefs   hgb 4.9  vaginal bleeding

## 2021-08-04 NOTE — CONSULT NOTE ADULT - PROBLEM SELECTOR RECOMMENDATION 9
hypernatremia  fluids as ordered
- HbA1c 6.5%  - now on 24 hour continuous tube feeds with hyperglycemia to the 500s  - stop Lantus  - give NPH 5 units x 1 today at 6 pm (given that Lantus 10 units is still on board), then start NPH 8 units q6 hours starting at midnight tonight  - low correction scale q6  - will follow  - discharge recs TBD based on feeding modality on dc
Secondary to severe anemia   iron transfusion   Monitor on tele   Check thyroid panel  TTE

## 2021-08-04 NOTE — SWALLOW BEDSIDE ASSESSMENT ADULT - SWALLOW EVAL: DIAGNOSIS
Pt presents with a functional oropharyngeal swallow for a soft texture diet. Prolonged and effortful mastication noted with hard solid textures. Timely pharyngeal swallow trigger noted across consistencies. No overt signs of laryngeal penetration/aspiration. Pt denies difficulty chewing/swallowing.

## 2021-08-04 NOTE — PROGRESS NOTE ADULT - SUBJECTIVE AND OBJECTIVE BOX
Subjective: Patient seen and examined. No new events except as noted.   pulled -out NGT  sleepy but arousable . More alert     REVIEW OF SYSTEMS:  Unable to obtain     MEDICATIONS:  MEDICATIONS  (STANDING):  ampicillin  IVPB 2 Gram(s) IV Intermittent every 6 hours  brimonidine 0.2% Ophthalmic Solution 1 Drop(s) Both EYES two times a day  dextrose 40% Gel 15 Gram(s) Oral once  dextrose 5%. 1000 milliLiter(s) (100 mL/Hr) IV Continuous <Continuous>  dextrose 5%. 1000 milliLiter(s) (50 mL/Hr) IV Continuous <Continuous>  dextrose 5%. 1000 milliLiter(s) (100 mL/Hr) IV Continuous <Continuous>  dextrose 50% Injectable 25 Gram(s) IV Push once  dextrose 50% Injectable 12.5 Gram(s) IV Push once  dextrose 50% Injectable 25 Gram(s) IV Push once  folic acid 1 milliGRAM(s) Enteral Tube daily  glucagon  Injectable 1 milliGRAM(s) IntraMuscular once  insulin lispro (ADMELOG) corrective regimen sliding scale   SubCutaneous every 6 hours  insulin NPH human recombinant 15 Unit(s) SubCutaneous every 6 hours  iron sucrose IVPB 200 milliGRAM(s) IV Intermittent every 24 hours  latanoprost 0.005% Ophthalmic Solution 1 Drop(s) Both EYES at bedtime  levETIRAcetam  IVPB 500 milliGRAM(s) IV Intermittent every 12 hours  mirtazapine 15 milliGRAM(s) Oral at bedtime  pantoprazole  Injectable 40 milliGRAM(s) IV Push daily  venlafaxine 37.5 milliGRAM(s) Oral every 12 hours      PHYSICAL EXAM:  T(C): 36.8 (08-04-21 @ 05:43), Max: 37.2 (08-03-21 @ 21:06)  HR: 110 (08-04-21 @ 05:43) (108 - 110)  BP: 138/62 (08-04-21 @ 05:43) (138/62 - 151/71)  RR: 18 (08-04-21 @ 05:43) (18 - 18)  SpO2: 98% (08-04-21 @ 05:43) (98% - 99%)  Wt(kg): --  I&O's Summary    03 Aug 2021 07:01  -  04 Aug 2021 07:00  --------------------------------------------------------  IN: 720 mL / OUT: 1150 mL / NET: -430 mL          Appearance: NAD  HEENT:   Dry  oral mucosa	  Lymphatic: No lymphadenopathy , no edema  Cardiovascular: Normal S1 S2, No JVD, No murmurs , Peripheral pulses palpable 2+ bilaterally  Respiratory: Decreased bs   Gastrointestinal:  Soft, Non-tender, + BS	  Skin: No rashes, No ecchymoses, No cyanosis, warm to touch  Musculoskeletal: Decreased range of motion and  strength  Psychiatry:  sleepy lethargic   Ext: No edema  +diaz Hematuria     LABS:    CARDIAC MARKERS:                                4.7    4.63  )-----------( 153      ( 03 Aug 2021 08:17 )             16.4     08-03    159<H>  |  121<H>  |  51<H>  ----------------------------<  319<H>  3.7   |  25  |  1.50<H>    Ca    9.7      03 Aug 2021 08:17      proBNP:   Lipid Profile:   HgA1c:   TSH:             TELEMETRY: 	    ECG:  	  RADIOLOGY:   DIAGNOSTIC TESTING:  [ ] Echocardiogram:  [ ]  Catheterization:  [ ] Stress Test:    OTHER:

## 2021-08-04 NOTE — SWALLOW BEDSIDE ASSESSMENT ADULT - COMMENTS
:  Ct C/A/P reviewed. ptn w metastatic dz process on CT scan. Heme consult called. will cont CBI as per . pending CT urogram. cont trending HH. started on IV IRON. prognosis is guarded.    : RRT called for hypotension and AMS, likely 2/2 acute blood loss anemia vs sepsis vs stroke. CTH negative for acute hemorrhage. Decision made by primary attending in conjunction with DNR/DNI status, MICU/Neurology teams at bedside that no further workup / imaging would be pursued.   : NGT placed. Patient is more responsive today though minimally, has enterococcal bacteremia. seen by ID , placed on IV AMPICILLIN while awaiting sensitivities. rpt blood cx sent.  : ptn is lethargic, hematuria has improved, now off  CBI, ongoing GLEN and hypernatremia though improved, heme, renal  following. Hyperglycemia, will dc d5W drip, place on standing LANTUS , cont Admelog on a sliding scale, Dr. Guthrie from [facility] called  : on and off confusion, refusing blood work   : Pt pulled out NGT. Multiple attempts made to replace NGT without success. Will hold off further NGT placement attempts for now and f/u in AM pending Speech/Swallow eval.    IMAGIN/28 CT Chest: 1.  Small right pleural effusion with likely underlying pleural thickening. 2.  Right upper middle lobe juxtapleural linear opacities are likely secondary to prior radiation treatment. 3.  Lucent lesions within the spine and ribs and manubrium are concerning for metastasis.   CXR: Small/moderate right pleural effusion and right middle and lower lobe linear opacities, likely atelectasis unchanged.

## 2021-08-04 NOTE — SWALLOW BEDSIDE ASSESSMENT ADULT - ASPIRATION PRECAUTIONS
Monitor for s/s aspiration/laryngeal penetration. If noted:  D/C p.o. intake, provide non-oral nutrition/hydration/meds, and contact this service @ [id number]

## 2021-08-04 NOTE — CONSULT NOTE ADULT - ASSESSMENT
69 yo F with Breat cancer and severe anemia   69 yo F with Breat cancer and severe anemia and hypernatremia. Palliative care called for GOC

## 2021-08-04 NOTE — PROGRESS NOTE ADULT - SUBJECTIVE AND OBJECTIVE BOX
Follow Up:  Bacteremia    Interval History: afebrile. no acute complaints.     REVIEW OF SYSTEMS  [  ] ROS unobtainable because:    [ x ] All other systems negative except as noted below    Constitutional:  [ ] fever [ ] chills  [ ] weight loss  [ ] weakness  Skin:  [ ] rash [ ] phlebitis	  Eyes: [ ] icterus [ ] pain  [ ] discharge	  ENMT: [ ] sore throat  [ ] thrush [ ] ulcers [ ] exudates  Respiratory: [ ] dyspnea [ ] hemoptysis [ ] cough [ ] sputum	  Cardiovascular:  [ ] chest pain [ ] palpitations [ ] edema	  Gastrointestinal:  [ ] nausea [ ] vomiting [ ] diarrhea [ ] constipation [ ] pain	  Genitourinary:  [ ] dysuria [ ] frequency [ ] hematuria [ ] discharge [ ] flank pain  [ ] incontinence  Musculoskeletal:  [ ] myalgias [ ] arthralgias [ ] arthritis  [ ] back pain  Neurological:  [ ] headache [ ] seizures  [ ] confusion/altered mental status    Allergies  No Known Allergies        ANTIMICROBIALS:  ampicillin  IVPB 2 every 6 hours      OTHER MEDS:  MEDICATIONS  (STANDING):  acetaminophen   Tablet .. 650 every 6 hours PRN  dextrose 40% Gel 15 once  dextrose 50% Injectable 25 once  dextrose 50% Injectable 12.5 once  dextrose 50% Injectable 25 once  epoetin lesley-epbx (RETACRIT) Injectable 04807 once  glucagon  Injectable 1 once  insulin glargine Injectable (LANTUS) 5 at bedtime  insulin lispro (ADMELOG) corrective regimen sliding scale  three times a day before meals  insulin lispro (ADMELOG) corrective regimen sliding scale  at bedtime  insulin lispro Injectable (ADMELOG) 2 three times a day before meals  levETIRAcetam  IVPB 500 every 12 hours  mirtazapine 15 at bedtime  oxyCODONE    IR 5 every 4 hours PRN  pantoprazole  Injectable 40 daily  venlafaxine 37.5 every 12 hours      Vital Signs Last 24 Hrs  T(C): 36.7 (04 Aug 2021 13:18), Max: 37.2 (03 Aug 2021 21:06)  T(F): 98 (04 Aug 2021 13:18), Max: 99 (03 Aug 2021 21:06)  HR: 105 (04 Aug 2021 13:18) (105 - 110)  BP: 138/74 (04 Aug 2021 13:18) (138/62 - 145/74)  BP(mean): --  RR: 17 (04 Aug 2021 13:18) (17 - 18)  SpO2: 97% (04 Aug 2021 13:18) (97% - 99%)    PHYSICAL EXAMINATION:  General: Alert and Awake, NAD  Cardiac: RRR, No M/R/G  Resp: CTAB, No Wh/Rh/Ra  Abdomen: NBS, NT/ND, No HSM, No rigidity or guarding  MSK: No LE edema. No Calf tenderness  Skin: No rashes or lesions. Skin is warm and dry to the touch.   Neuro: Alert and Awake. CN 2-12 Grossly intact. Moves all four extremities spontaneously.  Psych: Calm, Pleasant, Cooperative                          4.7    4.63  )-----------( 153      ( 03 Aug 2021 08:17 )             16.4       08-03    159<H>  |  121<H>  |  51<H>  ----------------------------<  319<H>  3.7   |  25  |  1.50<H>    Ca    9.7      03 Aug 2021 08:17        Urinalysis Basic - ( 04 Aug 2021 10:02 )    Color: BROWN / Appearance: Slightly Turbid / S.008 / pH: x  Gluc: x / Ketone: Negative  / Bili: Negative / Urobili: Negative   Blood: x / Protein: 100 / Nitrite: Negative   Leuk Esterase: Large / RBC: 1517 /hpf / WBC 33 /HPF   Sq Epi: x / Non Sq Epi: 4 /hpf / Bacteria: Negative        MICROBIOLOGY:  v  .Blood Blood-Peripheral  21   No growth to date.  --  --      .Blood Blood-Peripheral  21   Growth in aerobic and anaerobic bottles: Enterococcus faecalis  ***Blood Panel PCR results on this specimen are available  approximately 3 hours after the Gram stain result.***  Gram stain, PCR, and/or culture results may not always  correspond dueto difference in methodologies.  ************************************************************  This PCR assay was performed by multiplex PCR. This  Assay tests for 66 bacterial and resistance gene targets.  Please refer to the [facility] test directory  at [email or web address] for details.  --  Blood Culture PCR  Enterococcus faecalis      Catheterized Catheterized  21   >100,000 CFU/ml Enterococcus faecalis  --  Enterococcus faecalis                RADIOLOGY:    <The imaging below has been reviewed and visualized by me independently. Findings as detailed in report below>    < from: US Transvaginal (21 @ 16:20) >  IMPRESSION:  Hematoma and layering debris within the bladder in the context of right-sided nephroureterostomy and mild hydronephrosis.  No intrauterine pathology.    < from: CT Abdomen and Pelvis w/wo IV Cont (21 @ 21:54) >  IMPRESSION:  1.  Nonenhancing debris within the urinary bladder, likely reflecting blood clot, as seen on the prior ultrasound exam.  2.  Amorphous enhancing soft tissue surrounding the distal right ureter, which is of uncertain etiology but potentially reflecting neoplasm. Correlation with prior imaging would be helpful.  3.  Scattered sclerotic lesions within the visualized spine, indeterminate but suspicious for osseous metastatic disease. Age-indeterminate mild compression fracture deformities of T8 and T10 with associated sclerotic changes potentially due to degenerative changes, though metastatic disease is not excluded.  4.  Right nephroureteral catheter with mild residual right hydronephrosis.  5.  Atrophy of the left kidney with asymmetricly decreased contrast excretion, likely reflecting decreased renal function. No left hydronephrosis.  6.  Numerous subcentimeter hypoattenuating liver lesions which are indeterminate. Metastatic disease is not excluded. Consider further assessment with nonemergent abdominal MRI if not already performed.  7.  Small right pleural effusion with mildly thickened and enhancing pleura, which may be seen in the setting of infection or neoplastic involvement.  8.  Fecalization of the small bowel, which may be seen in the setting of stasis/small bowel bacterial overgrowth. No evidence of obstruction. Moderate colonic stool burden, likely reflecting constipation.    < from: Xray Chest 1 View- PORTABLE-Urgent (Xray Chest 1 View- PORTABLE-Urgent .) (21 @ 08:44) >  IMPRESSION:  Feeding tube tip terminates in the stomach.  Small/moderate right pleural effusion and right middle and lower lobe linear opacities, likely atelectasis unchanged.

## 2021-08-04 NOTE — PROGRESS NOTE ADULT - ASSESSMENT
71 yo Female Jehovah witness with PMHx of breast CA s/p BL mastectomy with recurrence and metastasis here with c/o vaginal bleeding, found to have bladder hematoma on pelvic ultrasound. Pt also with R sided nephU with mild hydro, but pt unsure indication for nephU. H/H 4.3/13.5, SCr 1.37. Pt follows up at [facility] and is currently receiving chemotherapy.   CT urogram reviewed, demonstrates soft tissue enhancement at distal R ureter (possible metastatic disease?), mild right hydro with R nephroU in place, L kidney atrophy, debris within bladder consistent with clot.    - Monitor urine color  - Increase hydration  - CBI is not indicated at this time  - RN may flush as needed

## 2021-08-04 NOTE — PROGRESS NOTE ADULT - PROBLEM SELECTOR PLAN 1
-Test BG ac and hs  -D/c NPH insulin  -Start low dose Lantus 5 units qhs  -Start Admelog ac meals 2 units. Low dose since unable to determine how much pt is going to eat. Hold if not eating!!  -Decrease Admelog correction scale to low dose ac and hs dose   -Will adjust insulin doses as needed  Disposition  -Will depend on PO/TF status, insulin requirements and BG levels.  -Pt unable to care for her DM independently at this time. Would need family to take care at home if going back home.  -Plan discussed with pt/team.  Contact info: [phone] (24/7). pager [phone] -Test BG ac and hs  -D/c NPH insulin  -Start low dose Lantus 5 units qhs. If FBG tomorrow while on D5 >180s please increase to 8 units.   -Start Admelog ac meals 2 units. Low dose since unable to determine how much pt is going to eat. Hold if not eating!!  -Decrease Admelog correction scale to low dose ac and hs dose   -Will adjust insulin doses as needed  Disposition  -Will depend on PO/TF status, insulin requirements and BG levels.  -Pt unable to care for her DM independently at this time. Would need family to take care at home if going back home.  -Plan discussed with pt/team.  Contact info: [phone] (24/7). pager [phone]

## 2021-08-04 NOTE — PROGRESS NOTE ADULT - SUBJECTIVE AND OBJECTIVE BOX
Overnight events noted   VITAL: T(C): , Max: 37.2 (08-03-21 @ 21:06) T(F): , Max: 99 (08-03-21 @ 21:06) HR: 110 (08-04-21 @ 05:43) BP: 138/62 (08-04-21 @ 05:43) RR: 18 (08-04-21 @ 05:43) SpO2: 98% (08-04-21 @ 05:43)   PHYSICAL EXAM: Constitutional: lethargic/mildly confused HEENT: DMM, (+)NGT Neck: Supple, No JVD Respiratory: CTA-b/l Cardiovascular: tachy reg s1s2 Gastrointestinal: BS+, soft, NT/ND : diaz-cbi  Extremities: No peripheral edema b/l Neurological: no focal deficits; strength grossly intact Back: no CVAT b/l Skin: No rashes, no nevi   LABS:                      4.7   4.63  )-----------( 153      ( 03 Aug 2021 08:17 )            16.4   Na(159)/K(3.7)/Cl(121)/HCO3(25)/BUN(51)/Cr(1.50)Glu(319)/Ca(9.7)/Mg(--)/PO4(--)    08-03 @ 08:17 Na(154)/K(3.6)/Cl(117)/HCO3(22)/BUN(48)/Cr(1.85)Glu(245)/Ca(9.2)/Mg(2.4)/PO4(3.1)    08-01 @ 08:59   IMPRESSION: 70F, Restoration, with breast CA, 7/27/21 p/w vaginal bleeding/severe anemia  (1)Renal - GLEN - ischemic ATN - resolving   (2)Hypernatremia - worsening, despite free water via NGT - needs D5W IV. Presumably simply due to poor free water access; would check UA+Uosm to rule out DI. Usually with a serum sodium this high, I would look to have bloodwork performed more than once daily...that being said, with her low Hb, we must look to limit blood draws.  (3)ID - enterococcal bacteremia - on IV Ampicillin  (4)Anemia - severe - in setting of being a Restoration. Iron deficiency; not indicated for SAWYER. On IV Venofer.   RECOMMEND: (1)D5W 100cc/h for now (2)Free water 250cc QID via NGT, when not NPO (3)UA, Uosm (4)Dose new meds for GFR 35-45ml/min (present dosing is acceptable) (5)BMP daily      David Nioxn MD [facility] Office: [phone] Cell: [phone]        pulled out NGT overnight   VITAL: T(C): , Max: 37.2 (08-03-21 @ 21:06) T(F): , Max: 99 (08-03-21 @ 21:06) HR: 110 (08-04-21 @ 05:43) BP: 138/62 (08-04-21 @ 05:43) RR: 18 (08-04-21 @ 05:43) SpO2: 98% (08-04-21 @ 05:43) urine output 1000cc/24h  PHYSICAL EXAM: Constitutional: obtunded HEENT: DMM, no NGT Neck: Supple, No JVD Respiratory: CTA-b/l Cardiovascular: tachy reg s1s2 Gastrointestinal: BS+, soft, NT/ND : (+)diaz Extremities: No peripheral edema b/l Neurological: no focal deficits; strength grossly intact Back: no CVAT b/l Skin: No rashes, no nevi   LABS:                      4.7   4.63  )-----------( 153      ( 03 Aug 2021 08:17 )            16.4   Na(159)/K(3.7)/Cl(121)/HCO3(25)/BUN(51)/Cr(1.50)Glu(319)/Ca(9.7)/Mg(--)/PO4(--)    08-03 @ 08:17 Na(154)/K(3.6)/Cl(117)/HCO3(22)/BUN(48)/Cr(1.85)Glu(245)/Ca(9.2)/Mg(2.4)/PO4(3.1)    08-01 @ 08:59   IMPRESSION: 70F, [employer]ism, with breast CA, 7/27/21 p/w vaginal bleeding/severe anemia  (1)Renal - GLEN - ischemic ATN - resolving   (2)Hypernatremia - worsening, despite free water via NGT - needs D5W IV. Presumably simply due to poor free water access; would check UA+Uosm to rule out DI. Usually with a serum sodium this high, I would look to have bloodwork performed more than once daily...that being said, with her low Hb, we must look to limit blood draws.  (3)ID - enterococcal bacteremia - on IV Ampicillin  (4)Anemia - severe - in setting of being a [employer]ism. Iron deficiency; not indicated for SAWYER. On IV Venofer.   RECOMMEND: (1)D5W 100cc/h for now (2)Free water 250cc QID via NGT, when not NPO (3)UA, Uosm (4)Dose new meds for GFR 35-45ml/min (present dosing is acceptable) (5)BMP daily      David Nixon MD [facility] Office: [phone] Cell: [phone]

## 2021-08-04 NOTE — CONSULT NOTE ADULT - PROBLEM SELECTOR PROBLEM 1
Type 2 diabetes mellitus with hyperglycemia, unspecified whether long term insulin use
Tachycardia
Metabolic encephalopathy

## 2021-08-04 NOTE — CONSULT NOTE ADULT - PROBLEM SELECTOR RECOMMENDATION 5
kps 30%  Spoke with pts HCP Hipolito.  He is concerned that he has not had an update on pts condition from the primary team. Hipoliot requests to speak with pts doctors prior to having GOC discussions.  He is unaware of where the pt is living.  He reports that she is staying with a friend named Wesley.  Pt had recently sold her home. He also repots that the pt has a special needs child who lives in a group home but he is unaware of when the child is at present. Spoke with team to update Hipolito to be able to further address GOC.  Palliative care will follow.

## 2021-08-05 LAB
ANION GAP SERPL CALC-SCNC: 13 MMOL/L — SIGNIFICANT CHANGE UP (ref 5–17)
BUN SERPL-MCNC: 47 MG/DL — HIGH (ref 7–23)
CALCIUM SERPL-MCNC: 9.6 MG/DL — SIGNIFICANT CHANGE UP (ref 8.4–10.5)
CHLORIDE SERPL-SCNC: 123 MMOL/L — HIGH (ref 96–108)
CHOLEST SERPL-MCNC: 154 MG/DL — SIGNIFICANT CHANGE UP
CO2 SERPL-SCNC: 23 MMOL/L — SIGNIFICANT CHANGE UP (ref 22–31)
CREAT SERPL-MCNC: 1.73 MG/DL — HIGH (ref 0.5–1.3)
CULTURE RESULTS: SIGNIFICANT CHANGE UP
GLUCOSE BLDC GLUCOMTR-MCNC: 263 MG/DL — HIGH (ref 70–99)
GLUCOSE BLDC GLUCOMTR-MCNC: 313 MG/DL — HIGH (ref 70–99)
GLUCOSE BLDC GLUCOMTR-MCNC: 346 MG/DL — HIGH (ref 70–99)
GLUCOSE BLDC GLUCOMTR-MCNC: 360 MG/DL — HIGH (ref 70–99)
GLUCOSE SERPL-MCNC: 308 MG/DL — HIGH (ref 70–99)
HCT VFR BLD CALC: 16.6 % — CRITICAL LOW (ref 34.5–45)
HDLC SERPL-MCNC: 28 MG/DL — LOW
HGB BLD-MCNC: 4.4 G/DL — CRITICAL LOW (ref 11.5–15.5)
LIPID PNL WITH DIRECT LDL SERPL: 93 MG/DL — SIGNIFICANT CHANGE UP
MCHC RBC-ENTMCNC: 26.5 GM/DL — LOW (ref 32–36)
MCHC RBC-ENTMCNC: 35.5 PG — HIGH (ref 27–34)
MCV RBC AUTO: 133.9 FL — HIGH (ref 80–100)
NON HDL CHOLESTEROL: 126 MG/DL — SIGNIFICANT CHANGE UP
NRBC # BLD: 13 /100 WBCS — HIGH (ref 0–0)
PLATELET # BLD AUTO: 124 K/UL — LOW (ref 150–400)
POTASSIUM SERPL-MCNC: 3.2 MMOL/L — LOW (ref 3.5–5.3)
POTASSIUM SERPL-SCNC: 3.2 MMOL/L — LOW (ref 3.5–5.3)
RBC # BLD: 1.24 M/UL — LOW (ref 3.8–5.2)
RBC # FLD: 29.4 % — HIGH (ref 10.3–14.5)
SODIUM SERPL-SCNC: 159 MMOL/L — HIGH (ref 135–145)
SPECIMEN SOURCE: SIGNIFICANT CHANGE UP
TRIGL SERPL-MCNC: 165 MG/DL — HIGH
WBC # BLD: 3.82 K/UL — SIGNIFICANT CHANGE UP (ref 3.8–10.5)
WBC # FLD AUTO: 3.82 K/UL — SIGNIFICANT CHANGE UP (ref 3.8–10.5)

## 2021-08-05 PROCEDURE — 99232 SBSQ HOSP IP/OBS MODERATE 35: CPT

## 2021-08-05 PROCEDURE — 99222 1ST HOSP IP/OBS MODERATE 55: CPT

## 2021-08-05 RX ORDER — DESMOPRESSIN ACETATE 0.1 MG/1
0.1 TABLET ORAL
Refills: 0 | Status: DISCONTINUED | OUTPATIENT
Start: 2021-08-05 | End: 2021-08-10

## 2021-08-05 RX ORDER — SODIUM CHLORIDE 9 MG/ML
1000 INJECTION, SOLUTION INTRAVENOUS
Refills: 0 | Status: DISCONTINUED | OUTPATIENT
Start: 2021-08-05 | End: 2021-08-06

## 2021-08-05 RX ORDER — INSULIN GLARGINE 100 [IU]/ML
8 INJECTION, SOLUTION SUBCUTANEOUS AT BEDTIME
Refills: 0 | Status: DISCONTINUED | OUTPATIENT
Start: 2021-08-05 | End: 2021-08-05

## 2021-08-05 RX ORDER — INSULIN LISPRO 100/ML
VIAL (ML) SUBCUTANEOUS
Refills: 0 | Status: DISCONTINUED | OUTPATIENT
Start: 2021-08-05 | End: 2021-08-05

## 2021-08-05 RX ORDER — POTASSIUM CHLORIDE 20 MEQ
40 PACKET (EA) ORAL EVERY 4 HOURS
Refills: 0 | Status: COMPLETED | OUTPATIENT
Start: 2021-08-05 | End: 2021-08-05

## 2021-08-05 RX ORDER — INSULIN GLARGINE 100 [IU]/ML
10 INJECTION, SOLUTION SUBCUTANEOUS AT BEDTIME
Refills: 0 | Status: DISCONTINUED | OUTPATIENT
Start: 2021-08-05 | End: 2021-08-06

## 2021-08-05 RX ORDER — INSULIN LISPRO 100/ML
VIAL (ML) SUBCUTANEOUS
Refills: 0 | Status: DISCONTINUED | OUTPATIENT
Start: 2021-08-05 | End: 2021-08-06

## 2021-08-05 RX ADMIN — Medication 40 MILLIEQUIVALENT(S): at 13:13

## 2021-08-05 RX ADMIN — Medication 40 MILLIEQUIVALENT(S): at 09:47

## 2021-08-05 RX ADMIN — MIRTAZAPINE 15 MILLIGRAM(S): 45 TABLET, ORALLY DISINTEGRATING ORAL at 21:39

## 2021-08-05 RX ADMIN — SODIUM CHLORIDE 100 MILLILITER(S): 9 INJECTION, SOLUTION INTRAVENOUS at 05:13

## 2021-08-05 RX ADMIN — PANTOPRAZOLE SODIUM 40 MILLIGRAM(S): 20 TABLET, DELAYED RELEASE ORAL at 13:11

## 2021-08-05 RX ADMIN — Medication 5: at 13:11

## 2021-08-05 RX ADMIN — DESMOPRESSIN ACETATE 0.1 MILLIGRAM(S): 0.1 TABLET ORAL at 17:45

## 2021-08-05 RX ADMIN — LATANOPROST 1 DROP(S): 0.05 SOLUTION/ DROPS OPHTHALMIC; TOPICAL at 21:42

## 2021-08-05 RX ADMIN — Medication 216 GRAM(S): at 13:10

## 2021-08-05 RX ADMIN — SODIUM CHLORIDE 125 MILLILITER(S): 9 INJECTION, SOLUTION INTRAVENOUS at 15:08

## 2021-08-05 RX ADMIN — Medication 8: at 17:45

## 2021-08-05 RX ADMIN — BRIMONIDINE TARTRATE 1 DROP(S): 2 SOLUTION/ DROPS OPHTHALMIC at 05:14

## 2021-08-05 RX ADMIN — SENNA PLUS 2 TABLET(S): 8.6 TABLET ORAL at 21:42

## 2021-08-05 RX ADMIN — BRIMONIDINE TARTRATE 1 DROP(S): 2 SOLUTION/ DROPS OPHTHALMIC at 17:46

## 2021-08-05 RX ADMIN — Medication 2 UNIT(S): at 09:47

## 2021-08-05 RX ADMIN — Medication 1: at 21:41

## 2021-08-05 RX ADMIN — Medication 216 GRAM(S): at 17:52

## 2021-08-05 RX ADMIN — LEVETIRACETAM 400 MILLIGRAM(S): 250 TABLET, FILM COATED ORAL at 09:47

## 2021-08-05 RX ADMIN — Medication 216 GRAM(S): at 05:16

## 2021-08-05 RX ADMIN — Medication 2 UNIT(S): at 17:45

## 2021-08-05 RX ADMIN — INSULIN GLARGINE 10 UNIT(S): 100 INJECTION, SOLUTION SUBCUTANEOUS at 21:39

## 2021-08-05 RX ADMIN — SODIUM CHLORIDE 125 MILLILITER(S): 9 INJECTION, SOLUTION INTRAVENOUS at 18:23

## 2021-08-05 RX ADMIN — Medication 37.5 MILLIGRAM(S): at 17:46

## 2021-08-05 RX ADMIN — Medication 2 UNIT(S): at 13:10

## 2021-08-05 RX ADMIN — Medication 1 MILLIGRAM(S): at 09:47

## 2021-08-05 RX ADMIN — LEVETIRACETAM 400 MILLIGRAM(S): 250 TABLET, FILM COATED ORAL at 22:05

## 2021-08-05 RX ADMIN — Medication 5: at 09:46

## 2021-08-05 RX ADMIN — Medication 37.5 MILLIGRAM(S): at 05:14

## 2021-08-05 NOTE — PROGRESS NOTE ADULT - SUBJECTIVE AND OBJECTIVE BOX
Overnight events noted   VITAL: T(C): , Max: 36.8 (21 @ 05:28) T(F): , Max: 98.2 (21 @ 05:28) HR: 101 (21 @ 12:30) BP: 122/67 (21 @ 12:30) BP(mean): -- RR: 18 (21 @ 12:30) SpO2: 97% (21 @ 12:30)   PHYSICAL EXAM: Constitutional: obtunded HEENT: DMM, no NGT Neck: Supple, No JVD Respiratory: CTA-b/l Cardiovascular: tachy reg s1s2 Gastrointestinal: BS+, soft, NT/ND : (+)diaz Extremities: No peripheral edema b/l Neurological: no focal deficits; strength grossly intact Back: no CVAT b/l Skin: No rashes, no nevi  LABS:                      4.4   3.82  )-----------( 124      ( 05 Aug 2021 07:08 )            16.6   Na(159)/K(3.2)/Cl(123)/HCO3(23)/BUN(47)/Cr(1.73)Glu(308)/Ca(9.6)/Mg(--)/PO4(--)     @ 07:06 Na(159)/K(3.7)/Cl(121)/HCO3(25)/BUN(51)/Cr(1.50)Glu(319)/Ca(9.7)/Mg(--)/PO4(--)     @ 08:17  Urinalysis Basic - ( 04 Aug 2021 10:02 ) Color: BROWN / Appearance: Slightly Turbid / S.008 / pH: x Gluc: x / Ketone: Negative  / Bili: Negative / Urobili: Negative  Blood: x / Protein: 100 / Nitrite: Negative  Leuk Esterase: Large / RBC: 1517 /hpf / WBC 33 /HPF  Sq Epi: x / Non Sq Epi: 4 /hpf / Bacteria: Negative Osmolality, Random Urine: 278 mos/kg ( @ 10:02)   IMPRESSION: 70F, Pentecostalism, with breast CA, 7/27/21 p/w vaginal bleeding/severe anemia  (1)Renal - GLEN - ischemic ATN - resolving   (2)Hypernatremia - Na still at 159meq/L today - Urine studies suggest a component of DI here. 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 Could benefit from DDAVP here.  (3)ID - enterococcal bacteremia - on IV Ampicillin  (4)Anemia - severe - in setting of being a Pentecostalism. Iron deficiency; not indicated for SAWYER. On IV Venofer.   RECOMMEND: (1)Add DDAVP 0.1mcg po bid (2)Increase D5W to 125cc/h IV (3)Dose new meds for GFR 35-45ml/min (present dosing is acceptable) (4)BMP daily    David Nixon MD [facility] Office: [phone] Cell: [phone]        no pain, no sob (+)thirsty  VITAL: T(C): , Max: 36.8 (21 @ 05:28) T(F): , Max: 98.2 (21 @ 05:28) HR: 101 (21 @ 12:30) BP: 122/67 (21 @ 12:30) RR: 18 (21 @ 12:30) SpO2: 97% (21 @ 12:30)   PHYSICAL EXAM: Constitutional: alert (improved mentation from yesterday); (+)soft restraints HEENT: DMM, no NGT Neck: Supple, No JVD Respiratory: CTA-b/l Cardiovascular: tachy reg s1s2 Gastrointestinal: BS+, soft, NT/ND : (+)diaz-clear red urine Extremities: No peripheral edema b/l Neurological: no focal deficits; strength grossly intact Back: no CVAT b/l Skin: No rashes, no nevi  LABS:                      4.4   3.82  )-----------( 124      ( 05 Aug 2021 07:08 )            16.6   Na(159)/K(3.2)/Cl(123)/HCO3(23)/BUN(47)/Cr(1.73)Glu(308)/Ca(9.6)/Mg(--)/PO4(--)     @ 07:06 Na(159)/K(3.7)/Cl(121)/HCO3(25)/BUN(51)/Cr(1.50)Glu(319)/Ca(9.7)/Mg(--)/PO4(--)     @ 08:17  Urinalysis Basic - ( 04 Aug 2021 10:02 ) Color: BROWN / Appearance: Slightly Turbid / S.008 / pH: x Gluc: x / Ketone: Negative  / Bili: Negative / Urobili: Negative  Blood: x / Protein: 100 / Nitrite: Negative  Leuk Esterase: Large / RBC: 1517 /hpf / WBC 33 /HPF  Sq Epi: x / Non Sq Epi: 4 /hpf / Bacteria: Negative Osmolality, Random Urine: 278 mos/kg ( @ 10:02)   IMPRESSION: 70F, Restorationism, with breast CA, 21 p/w vaginal bleeding/severe anemia  (1)Renal - GLEN - ischemic ATN - resolving   (2)Hypernatremia - Na still at 159meq/L today - Urine studies suggest a component of DI here. 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 Could benefit from DDAVP here.  (3)ID - enterococcal bacteremia - on IV Ampicillin  (4)Anemia - severe - in setting of being a Restorationism. Iron deficiency; not indicated for SAWYER. On IV Venofer.   RECOMMEND: (1)Add DDAVP 0.1mcg po bid (2)Increase D5W to 125cc/h IV (3)Dose new meds for GFR 35-45ml/min (present dosing is acceptable) (4)BMP daily    David Nixon MD [facility] Office: [phone] Cell: [phone]

## 2021-08-05 NOTE — PROGRESS NOTE ADULT - ASSESSMENT
69 yo Female Jehovah witness with PMHx of breast CA s/p BL mastectomy with recurrence and metastasis here with c/o vaginal bleeding, found to have bladder hematoma on pelvic ultrasound. Pt also with R sided nephU with mild hydro, but pt unsure indication for nephU.   on admission: H/H 4.3/13.5, SCr 1.37.  Pt follows up at [facility] and is currently receiving chemotherapy.     7/28: Hct 11.2 from 13.5 yesterday. ptn is pale, lethargic, dyspneic, expresses wishes not to have any blood products based on her GD DOV. ptn placed her proxy on the phone Mr. Gray who wanted to confirm the hospital is respecting ptn's wishes. both ptn and the proxy aksed baout being transferred to [facility]. however ptn is not stable w HGB of 3 to be travelling. denies CP, palps, HA, has ongoing hematuria, CBI in place. Ct C/A/P reviewed. ptn w metastatic dz process on CT scan. Heme consult called. will cont CBI as per . pending CT urogram. cont trending HH. started on IV IRON. prognosis is guarded.   7/29: ptn found unresponsive with a droop, fever, ongoing hematuria, had filled out a MOLST form , she is DNR/DNI, code stroke called, will give Abx, keep temp down w cooling blanket, since ptn cannot receive anticoagulation/is dnr/dni will not get CTA of brain, will place on KEPPRA for sz, get eeg, prognosis is grave. this was d/w ,Mr Gray ptn's proxy. MICU consult reviewed. prognosis is grave  7/30:   ptn is morwe responsive today though minimally, has enterococcal bacteremia. seen by ID , placed on IV AMPICILLIN while awaiting sensitivities. rpt blood cx sent. will check HH in am. all blood draws should be done in pediatric [facility] . GLEN, ongoing 2/2 ischemic ATN, hematuria improving  7/31:  little more responsive today, has hypernatremia, will start free water via NGT. has clots in CBI. rpt hgb3.6, ptn is hyperglycemic. will call endo  8/1: ptn is lethargic, hematuria has improved, now off  CBI, ongoing GLEN and hypernatremia though improved, heme, renal  following. Hyperglycemia, will dc d5W drip, place on standing LANTUS , cont Admelog on a sliding scale, Dr. Guthrie from [facility] called. increase free water intake to 250 q4H per NGT. rpt cbc and BMP in am via pediatric tubes. tranexamic acid, EPO, IV iron, folic acid and B12 as per heme  8/2: awake, alert, responds appropriately. on and off confusion, diaz in place w hematuria, no clots. off CBI.  ptn refused Blood work today. on NGT feeds w free water per NGT, hyperglycemia is ongoing, not controlled, endo on board. will DC IVF  8/3: ptn more awake and alert. on and off confusion. HH improving. heme following, diaz w pink urine. hypernatremia worsening despite FREE water via NGT  8/4: pulled NGT. will need to replace. ptn had filled out a MOLST form prior to arrival. no artifical feeds. remain NPO 2/2 failed swallow study. check HH and Na tomorrow. on IVF. getting procrit today. has pink urine in diaz  8/5: passed S&S, placed on a soft diet, has DI as per renal, placed on DDAVP, hence persistent hypernatremia. on IV ABx for bacteremia, rpt blood cx neg. Diaz in place, pink clear urine. HH is still quite low, on IV iron and procrit.

## 2021-08-05 NOTE — PROGRESS NOTE ADULT - SUBJECTIVE AND OBJECTIVE BOX
Patient is a 70y old  Female who presents with a chief complaint of severe anemia, acute blood loss 2/2 hematuria (05 Aug 2021 13:51)      SUBJECTIVE / OVERNIGHT EVENTS: passed S&S, placed on dysphagia diet, has DI as per renal, hence persistent hypernatremia    MEDICATIONS  (STANDING):  ampicillin  IVPB 2 Gram(s) IV Intermittent every 6 hours  brimonidine 0.2% Ophthalmic Solution 1 Drop(s) Both EYES two times a day  desmopressin 0.1 milliGRAM(s) Oral two times a day  dextrose 40% Gel 15 Gram(s) Oral once  dextrose 5%. 1000 milliLiter(s) (125 mL/Hr) IV Continuous <Continuous>  dextrose 5%. 1000 milliLiter(s) (50 mL/Hr) IV Continuous <Continuous>  dextrose 5%. 1000 milliLiter(s) (100 mL/Hr) IV Continuous <Continuous>  dextrose 50% Injectable 25 Gram(s) IV Push once  dextrose 50% Injectable 12.5 Gram(s) IV Push once  dextrose 50% Injectable 25 Gram(s) IV Push once  folic acid 1 milliGRAM(s) Enteral Tube daily  glucagon  Injectable 1 milliGRAM(s) IntraMuscular once  insulin glargine Injectable (LANTUS) 10 Unit(s) SubCutaneous at bedtime  insulin lispro (ADMELOG) corrective regimen sliding scale   SubCutaneous at bedtime  insulin lispro (ADMELOG) corrective regimen sliding scale   SubCutaneous three times a day before meals  insulin lispro Injectable (ADMELOG) 2 Unit(s) SubCutaneous three times a day before meals  latanoprost 0.005% Ophthalmic Solution 1 Drop(s) Both EYES at bedtime  levETIRAcetam  IVPB 500 milliGRAM(s) IV Intermittent every 12 hours  mirtazapine 15 milliGRAM(s) Oral at bedtime  pantoprazole  Injectable 40 milliGRAM(s) IV Push daily  senna 2 Tablet(s) Oral at bedtime  venlafaxine 37.5 milliGRAM(s) Oral every 12 hours    MEDICATIONS  (PRN):  acetaminophen   Tablet .. 650 milliGRAM(s) Oral every 6 hours PRN Temp greater or equal to 38C (100.4F), Mild Pain (1 - 3)  artificial  tears Solution 1 Drop(s) Both EYES four times a day PRN Dry Eyes      Vital Signs Last 24 Hrs  T(F): 98.1 (21 @ 17:54), Max: 98.2 (21 @ 05:28)  HR: 102 (21 @ 17:54) (101 - 102)  BP: 148/77 (21 @ 17:54) (122/67 - 155/69)  RR: 18 (21 @ 17:54) (17 - 18)  SpO2: 98% (21 @ 17:54) (96% - 98%)  Telemetry:   CAPILLARY BLOOD GLUCOSE      POCT Blood Glucose.: 313 mg/dL (05 Aug 2021 17:17)  POCT Blood Glucose.: 346 mg/dL (05 Aug 2021 12:59)  POCT Blood Glucose.: 360 mg/dL (05 Aug 2021 09:15)  POCT Blood Glucose.: 362 mg/dL (04 Aug 2021 22:39)  POCT Blood Glucose.: 385 mg/dL (04 Aug 2021 21:05)    I&O's Summary    04 Aug 2021 07:  -  05 Aug 2021 07:00  --------------------------------------------------------  IN: 8350 mL / OUT: 8800 mL / NET: -450 mL    05 Aug 2021 07:01  -  05 Aug 2021 18:42  --------------------------------------------------------  IN: 2440 mL / OUT: 900 mL / NET: 1540 mL        PHYSICAL EXAM:  GENERAL: NAD, well-developed  HEAD:  Atraumatic, Normocephalic  EYES: EOMI, PERRLA, conjunctiva and sclera clear  NECK: Supple, No JVD  CHEST/LUNG: Clear to auscultation bilaterally; No wheeze  HEART: Regular rate and rhythm; No murmurs, rubs, or gallops  ABDOMEN: Soft, Nontender, Nondistended; Bowel sounds present  EXTREMITIES:  2+ Peripheral Pulses, No clubbing, cyanosis, or edema  PSYCH: AAOx3  NEUROLOGY: non-focal  SKIN: No rashes or lesions    LABS:                        4.4    3.82  )-----------( 124      ( 05 Aug 2021 07:08 )             16.6     08-05    159<H>  |  123<H>  |  47<H>  ----------------------------<  308<H>  3.2<L>   |  23  |  1.73<H>    Ca    9.6      05 Aug 2021 07:06            Urinalysis Basic - ( 04 Aug 2021 10:02 )    Color: BROWN / Appearance: Slightly Turbid / S.008 / pH: x  Gluc: x / Ketone: Negative  / Bili: Negative / Urobili: Negative   Blood: x / Protein: 100 / Nitrite: Negative   Leuk Esterase: Large / RBC: 1517 /hpf / WBC 33 /HPF   Sq Epi: x / Non Sq Epi: 4 /hpf / Bacteria: Negative        RADIOLOGY & ADDITIONAL TESTS:    Imaging Personally Reviewed:    Consultant(s) Notes Reviewed:      Care Discussed with Consultants/Other Providers:

## 2021-08-05 NOTE — PROGRESS NOTE ADULT - SUBJECTIVE AND OBJECTIVE BOX
Diabetes Follow up note:    Chief complaint: T2DM    Interval Hx: BG values in 300s since last evening. Pt seen at bedside w/RN present. Not eating much of meals presently. Remains on D5 infusion @ 100 cc/hr. Only ate a few bites this morning per RN.     Review of Systems:  General: denies pain  GI: Endorses poor appetite.   CV: Denies CP/SOB  ENDO: No S&Sx of hypoglycemia    MEDS:  insulin glargine Injectable (LANTUS) 10 Unit(s) SubCutaneous at bedtime  insulin lispro (ADMELOG) corrective regimen sliding scale   SubCutaneous at bedtime  insulin lispro (ADMELOG) corrective regimen sliding scale   SubCutaneous three times a day before meals  insulin lispro Injectable (ADMELOG) 2 Unit(s) SubCutaneous three times a day before meals    ampicillin  IVPB 2 Gram(s) IV Intermittent every 6 hours    Allergies    No Known Allergies          PE:  General: Female lying in bed. NAD.   Vital Signs Last 24 Hrs  T(C): 36.8 (05 Aug 2021 05:28), Max: 36.8 (05 Aug 2021 05:28)  T(F): 98.2 (05 Aug 2021 05:28), Max: 98.2 (05 Aug 2021 05:28)  HR: 101 (05 Aug 2021 05:28) (101 - 105)  BP: 155/69 (05 Aug 2021 05:28) (133/74 - 155/69)  BP(mean): --  RR: 17 (05 Aug 2021 05:28) (17 - 17)  SpO2: 97% (05 Aug 2021 05:28) (96% - 97%)  Abd: Soft, NT,ND,   Extremities: Warm. no edema x 4 ext.   Neuro: A&O X3    LABS:  POCT Blood Glucose.: 360 mg/dL (08-05-21 @ 09:15)  POCT Blood Glucose.: 362 mg/dL (08-04-21 @ 22:39)  POCT Blood Glucose.: 385 mg/dL (08-04-21 @ 21:05)  POCT Blood Glucose.: 290 mg/dL (08-04-21 @ 17:11)  POCT Blood Glucose.: 362 mg/dL (08-04-21 @ 12:31)  POCT Blood Glucose.: 173 mg/dL (08-04-21 @ 06:11)  POCT Blood Glucose.: 173 mg/dL (08-04-21 @ 04:01)  POCT Blood Glucose.: 269 mg/dL (08-04-21 @ 00:30)  POCT Blood Glucose.: 264 mg/dL (08-03-21 @ 17:38)  POCT Blood Glucose.: 358 mg/dL (08-03-21 @ 12:22)  POCT Blood Glucose.: 361 mg/dL (08-03-21 @ 05:54)  POCT Blood Glucose.: 364 mg/dL (08-02-21 @ 23:23)  POCT Blood Glucose.: 415 mg/dL (08-02-21 @ 17:47)  POCT Blood Glucose.: 370 mg/dL (08-02-21 @ 11:52)                            4.4    3.82  )-----------( 124      ( 05 Aug 2021 07:08 )             16.6       08-05    159<H>  |  123<H>  |  47<H>  ----------------------------<  308<H>  3.2<L>   |  23  |  1.73<H>    Ca    9.6      05 Aug 2021 07:06        Thyroid Function Tests:  07-29 @ 19:56 TSH 2.45 FreeT4 -- T3 -- Anti TPO -- Anti Thyroglobulin Ab -- TSI --      A1C with Estimated Average Glucose Result: 6.5 % (07-28-21 @ 09:21)          Contact number: martin [phone] or [phone]

## 2021-08-05 NOTE — PROGRESS NOTE ADULT - ASSESSMENT
69 yo F Buddhist, hx of breast Ca on active chemo (unsure of name) and anemia p/w reported vaginal bleeding. Recent admission to [facility] for the same, underwent EPO treatment and discharged. Reported Hgb of 5 during admission. Feeling generally weak, denies chest pain sob. Reports bright red blood in the toilet when she urinates. Denies dysuria, abdominal pain. Patient is unable to receive blood products of any kind.  has been tachycardic.

## 2021-08-05 NOTE — CONSULT NOTE ADULT - REASON FOR ADMISSION
severe anemia, acute blood loss 2/2 hematuria

## 2021-08-05 NOTE — CONSULT NOTE ADULT - CONSULT REASON
bacteremia
hematuria
hyperglycemia
Rehabilitation consult
GOC
anemia
hypotension
Azotemia
Cardiac management

## 2021-08-05 NOTE — PROVIDER CONTACT NOTE (CRITICAL VALUE NOTIFICATION) - TEST AND RESULT REPORTED:
Hgb: 3.4, Hct: 11.2
positive growth aerobic and anaerobic gram and cocci pairs chains
Hgb 3.6 Hct 12
Hemoglobin 4.4 and hematocrit 16.6
Lactate 7.4 HCT 13 HGB 4

## 2021-08-05 NOTE — PROVIDER CONTACT NOTE (CRITICAL VALUE NOTIFICATION) - ACTION/TREATMENT ORDERED:
PA made aware, order vancomycin x1 order, continue with cefepime.
NP aware. No new orders at this time
NP made aware. No further actions at this time. Continue to monitor patient.
Provider aware, continue to monitor.
fluids, iron, epogen, cooling method

## 2021-08-05 NOTE — PROVIDER CONTACT NOTE (CRITICAL VALUE NOTIFICATION) - SITUATION
Hemoglobin 4.4 and hematocrit 16.6
Hgb: 3.4, Hct: 11.2
positive growth aerobic and anaerobic gram and cocci pairs chains
Hgb 3.6 Hct 12
Pt had labs sent during RRT

## 2021-08-05 NOTE — PROGRESS NOTE ADULT - PROBLEM SELECTOR PLAN 1
-test BG AC/HS  -Increase Lantus 10 units QHS while on d5 infusion. Can decrease to 8 units if D5 discontinued.   -c/w Admelog 2 units AC meals for now since pt w/continued poor PO intake  -Change Admelog to modified correction scale AC (2-7 units) and low HS scale  -Will adjust insulin doses as needed  Disposition  -Will depend on PO/TF status, insulin requirements and BG levels.

## 2021-08-05 NOTE — PROGRESS NOTE ADULT - ASSESSMENT
69 y/o F, Confucianism, w/h/o T2DM> HbA1c 6.5% (Skewed due to severe anemia) . DM c/b neuropath, CKD. Also h/o breast Ca on chemo (unsure of name) anemia, HTN, HLD/ Here with severe anemia, acute blood loss 2/2 hematuria> unable ot receive blood. Consult called for management of hyperglycemia. Now off tube feeds started on PO diet. Eating small amounts of food however w/hyperglycemia in setting of D5 infusion (hypernatremia) and infection. BG goal (100-180mg/dl).

## 2021-08-05 NOTE — CONSULT NOTE ADULT - CONSULT REQUESTED DATE/TIME
27-Jul-2021 17:57
04-Aug-2021 11:10
29-Jul-2021 17:23
05-Aug-2021 10:40
28-Jul-2021 07:40
29-Jul-2021
29-Jul-2021 11:31
30-Jul-2021 14:26
02-Aug-2021 15:04

## 2021-08-05 NOTE — PROVIDER CONTACT NOTE (CRITICAL VALUE NOTIFICATION) - ASSESSMENT
Patient A&OX1. Lethargic but responds to verbal stimuli. Patient pale skin color, weak . Patient on CBI, Urine pink.
patient is a/ox4, currently on CBI d/t hematuria
Patient A&Ox2, VSS. Hematuria unchanged. Patient denies chest pain, lightheadedness, or shortness of breath.
see RRT/code stroke sheet

## 2021-08-05 NOTE — PROVIDER CONTACT NOTE (CRITICAL VALUE NOTIFICATION) - BACKGROUND
Pt admitted for anemia due to vaginal bleeding
Pt admitted for anemia due to vaginal bleeding
Patient admitted for severe anemia.
patient is admitted for anemia and is a Anab[employer]ism. Hx of breast CA on active chemotherapy.
Patient admitted for anemia. Patient is a Restorationism and does not want to receive blood products.

## 2021-08-05 NOTE — CONSULT NOTE ADULT - SUBJECTIVE AND OBJECTIVE BOX
HPI:  69 yo F Baptist, hx of breast Ca on active chemo (unsure of name) and anemia p/w reported vaginal bleeding. Recent admission to [facility] for the same, underwent EPO treatment and discharged. Reported Hgb of 5 during admission. Feeling generally weak, denies chest pain sob. Reports bright red blood in the toilet when she urinates. Denies dysuria, abdominal pain. Patient is unable to receive blood products of any kind. (2021 21:01)    Patient was admitted on , seen by , no urologic intervention recommended, rapid response called on  for acute mental status changes, started on abx for enterococcal bacteremia. Patient with confusion, lethargy, follows commands.     REVIEW OF SYSTEMS  Constitutional - No fever, No weight loss, + fatigue  HEENT - No eye pain, No visual disturbances, No difficulty hearing, No tinnitus, No vertigo, No neck pain  Respiratory - No cough, No wheezing, No shortness of breath  Cardiovascular - No chest pain, No palpitations  Gastrointestinal - No abdominal pain, No nausea, No vomiting, No diarrhea, No constipation  Genitourinary - +diaz  Neurological - +confusion   Psychiatric - No depression, No anxiety    VITALS  T(C): 36.8 (21 @ 05:28), Max: 36.8 (21 @ 05:28)  HR: 101 (21 @ 05:28) (101 - 105)  BP: 155/69 (21 @ 05:28) (133/74 - 155/69)  RR: 17 (21 @ 05:28) (17 - 17)  SpO2: 97% (21 @ 05:28) (96% - 97%)  Wt(kg): --    PAST MEDICAL & SURGICAL HISTORY  Anemia    No significant past surgical history        SOCIAL HISTORY  Smoking - Denied  EtOH - Denied   Drugs - Denied    FUNCTIONAL HISTORY  Lives in [place], recent hospitalization at [place]   Independent AMB and ADLs PTA     CURRENT FUNCTIONAL STATUS  8/4 SLP  functional oropharyngeal swallow  soft texture diet      FAMILY HISTORY   no pertinent history in first degree relatives     RECENT LABS/IMAGING  CBC Full  -  ( 05 Aug 2021 07:08 )  WBC Count : 3.82 K/uL  RBC Count : 1.24 M/uL  Hemoglobin : 4.4 g/dL  Hematocrit : 16.6 %  Platelet Count - Automated : 124 K/uL  Mean Cell Volume : 133.9 fl  Mean Cell Hemoglobin : 35.5 pg  Mean Cell Hemoglobin Concentration : 26.5 gm/dL  Auto Neutrophil # : x  Auto Lymphocyte # : x  Auto Monocyte # : x  Auto Eosinophil # : x  Auto Basophil # : x  Auto Neutrophil % : x  Auto Lymphocyte % : x  Auto Monocyte % : x  Auto Eosinophil % : x  Auto Basophil % : x    08-05    159<H>  |  123<H>  |  47<H>  ----------------------------<  308<H>  3.2<L>   |  23  |  1.73<H>    Ca    9.6      05 Aug 2021 07:06      Urinalysis Basic - ( 04 Aug 2021 10:02 )    Color: BROWN / Appearance: Slightly Turbid / S.008 / pH: x  Gluc: x / Ketone: Negative  / Bili: Negative / Urobili: Negative   Blood: x / Protein: 100 / Nitrite: Negative   Leuk Esterase: Large / RBC: 1517 /hpf / WBC 33 /HPF   Sq Epi: x / Non Sq Epi: 4 /hpf / Bacteria: Negative    < from: CT Head No Cont (0729.21 @ 16:16) >      INTERPRETATION:  INDICATIONS:  r/o stroke code stroke.    TECHNIQUE:  Serial axial images were obtained from the skull base to the vertex without intravenous contrast. Sagittal and Coronal reformats were performed    COMPARISON EXAMINATION: None.    FINDINGS:  VENTRICLES AND SULCI: Mild involutional changes given the patient's age  INTRA-AXIAL:  Microvascular ischemic changes involving the periventricular and subcortical white matter age indeterminate.  EXTRA-AXIAL:  No mass or collection is seen.  VISUALIZED SINUSES:  Clear.  VISUALIZED MASTOIDS:  Clear.  CALVARIUM:  Normal.  MISCELLANEOUS:  None.    IMPRESSION:  No intracranial hemorrhage      < end of copied text >      ALLERGIES  No Known Allergies      MEDICATIONS   acetaminophen   Tablet .. 650 milliGRAM(s) Oral every 6 hours PRN  ampicillin  IVPB 2 Gram(s) IV Intermittent every 6 hours  artificial  tears Solution 1 Drop(s) Both EYES four times a day PRN  brimonidine 0.2% Ophthalmic Solution 1 Drop(s) Both EYES two times a day  dextrose 40% Gel 15 Gram(s) Oral once  dextrose 5%. 1000 milliLiter(s) IV Continuous <Continuous>  dextrose 5%. 1000 milliLiter(s) IV Continuous <Continuous>  dextrose 5%. 1000 milliLiter(s) IV Continuous <Continuous>  dextrose 50% Injectable 25 Gram(s) IV Push once  dextrose 50% Injectable 12.5 Gram(s) IV Push once  dextrose 50% Injectable 25 Gram(s) IV Push once  folic acid 1 milliGRAM(s) Enteral Tube daily  glucagon  Injectable 1 milliGRAM(s) IntraMuscular once  insulin glargine Injectable (LANTUS) 8 Unit(s) SubCutaneous at bedtime  insulin lispro (ADMELOG) corrective regimen sliding scale   SubCutaneous three times a day before meals  insulin lispro (ADMELOG) corrective regimen sliding scale   SubCutaneous at bedtime  insulin lispro Injectable (ADMELOG) 2 Unit(s) SubCutaneous three times a day before meals  latanoprost 0.005% Ophthalmic Solution 1 Drop(s) Both EYES at bedtime  levETIRAcetam  IVPB 500 milliGRAM(s) IV Intermittent every 12 hours  mirtazapine 15 milliGRAM(s) Oral at bedtime  pantoprazole  Injectable 40 milliGRAM(s) IV Push daily  potassium chloride   Solution 40 milliEquivalent(s) Oral every 4 hours  senna 2 Tablet(s) Oral at bedtime  venlafaxine 37.5 milliGRAM(s) Oral every 12 hours      ----------------------------------------------------------------------------------------  PHYSICAL EXAM  Constitutional - NAD, Comfortable, in bed   Chest - Breathing comfortably  Cardiovascular - S1S2   Abdomen - Soft   Extremities - b/l mittens    Neurologic Exam -            lethargic, some confusion noted        Cognitive - Awake, Alert, AAO to self, place, date, year, situation     Communication - Fluent, No dysarthria        Motor - moves all ext        Sensory - Intact to LT     Psychiatric - Mood stable, Affect WNL  ----------------------------------------------------------------------------------------  ASSESSMENT/PLAN  70yFemale PMHx of breast CA s/p BL mastectomy with recurrence and metastasis with functional deficits due to severe anemia, bladder hematoma  +hematuria, H/H: 4.4/16.6, no blood products, on IV Iron   CT head with no hemorrhage, started on keppra  enterococcal bacteremia, on ampicillin  Pain - Tylenol  DVT PPX - SCDs  Rehab -   cleared for soft diet  out of bed to chair  patient is currently unable to participate with bedside therapy PT/OT due to severe anemia  Will continue to follow for ongoing rehab needs and recommendations.

## 2021-08-05 NOTE — PROGRESS NOTE ADULT - SUBJECTIVE AND OBJECTIVE BOX
Subjective: Patient seen and examined. No new events except as noted.     REVIEW OF SYSTEMS:    CONSTITUTIONAL: + weakness, fevers or chills  EYES/ENT: No visual changes;  No vertigo or throat pain   NECK: No pain or stiffness  RESPIRATORY: No cough, wheezing, hemoptysis; No shortness of breath  CARDIOVASCULAR: No chest pain or palpitations  GASTROINTESTINAL: No abdominal or epigastric pain. No nausea, vomiting, or hematemesis; No diarrhea or constipation. No melena or hematochezia.  GENITOURINARY: No dysuria, frequency or hematuria  NEUROLOGICAL: No numbness or weakness  SKIN: No itching, burning, rashes, or lesions   All other review of systems is negative unless indicated above.    MEDICATIONS:  MEDICATIONS  (STANDING):  ampicillin  IVPB 2 Gram(s) IV Intermittent every 6 hours  brimonidine 0.2% Ophthalmic Solution 1 Drop(s) Both EYES two times a day  dextrose 40% Gel 15 Gram(s) Oral once  dextrose 5%. 1000 milliLiter(s) (100 mL/Hr) IV Continuous <Continuous>  dextrose 5%. 1000 milliLiter(s) (50 mL/Hr) IV Continuous <Continuous>  dextrose 5%. 1000 milliLiter(s) (100 mL/Hr) IV Continuous <Continuous>  dextrose 50% Injectable 25 Gram(s) IV Push once  dextrose 50% Injectable 12.5 Gram(s) IV Push once  dextrose 50% Injectable 25 Gram(s) IV Push once  folic acid 1 milliGRAM(s) Enteral Tube daily  glucagon  Injectable 1 milliGRAM(s) IntraMuscular once  insulin glargine Injectable (LANTUS) 8 Unit(s) SubCutaneous at bedtime  insulin lispro (ADMELOG) corrective regimen sliding scale   SubCutaneous three times a day before meals  insulin lispro (ADMELOG) corrective regimen sliding scale   SubCutaneous at bedtime  insulin lispro Injectable (ADMELOG) 2 Unit(s) SubCutaneous three times a day before meals  latanoprost 0.005% Ophthalmic Solution 1 Drop(s) Both EYES at bedtime  levETIRAcetam  IVPB 500 milliGRAM(s) IV Intermittent every 12 hours  mirtazapine 15 milliGRAM(s) Oral at bedtime  pantoprazole  Injectable 40 milliGRAM(s) IV Push daily  potassium chloride   Solution 40 milliEquivalent(s) Oral every 4 hours  senna 2 Tablet(s) Oral at bedtime  venlafaxine 37.5 milliGRAM(s) Oral every 12 hours      PHYSICAL EXAM:  T(C): 36.8 (08-05-21 @ 05:28), Max: 36.8 (08-05-21 @ 05:28)  HR: 101 (08-05-21 @ 05:28) (101 - 105)  BP: 155/69 (08-05-21 @ 05:28) (133/74 - 155/69)  RR: 17 (08-05-21 @ 05:28) (17 - 17)  SpO2: 97% (08-05-21 @ 05:28) (96% - 97%)  Wt(kg): --  I&O's Summary    04 Aug 2021 07:01  -  05 Aug 2021 07:00  --------------------------------------------------------  IN: 8350 mL / OUT: 8800 mL / NET: -450 mL    05 Aug 2021 07:01  -  05 Aug 2021 10:17  --------------------------------------------------------  IN: 250 mL / OUT: 0 mL / NET: 250 mL            Appearance: NAD  HEENT:   Dry  oral mucosa	  Lymphatic: No lymphadenopathy , no edema  Cardiovascular: Normal S1 S2, No JVD, No murmurs , Peripheral pulses palpable 2+ bilaterally  Respiratory: Decreased bs   Gastrointestinal:  Soft, Non-tender, + BS	  Skin: No rashes, No ecchymoses, No cyanosis, warm to touch  Musculoskeletal: Decreased range of motion and  strength  Psychiatry:  sleepy lethargic   Ext: No edema  +diaz Hematuria       LABS:    CARDIAC MARKERS:                                4.4    3.82  )-----------( 124      ( 05 Aug 2021 07:08 )             16.6     08-05    159<H>  |  123<H>  |  47<H>  ----------------------------<  308<H>  3.2<L>   |  23  |  1.73<H>    Ca    9.6      05 Aug 2021 07:06      proBNP:   Lipid Profile:   HgA1c:   TSH:     4          TELEMETRY: 	    ECG:  	  RADIOLOGY:   DIAGNOSTIC TESTING:  [ ] Echocardiogram:  [ ]  Catheterization:  [ ] Stress Test:    OTHER:

## 2021-08-06 LAB
ANION GAP SERPL CALC-SCNC: 11 MMOL/L — SIGNIFICANT CHANGE UP (ref 5–17)
BUN SERPL-MCNC: 34 MG/DL — HIGH (ref 7–23)
CALCIUM SERPL-MCNC: 8.1 MG/DL — LOW (ref 8.4–10.5)
CHLORIDE SERPL-SCNC: 112 MMOL/L — HIGH (ref 96–108)
CO2 SERPL-SCNC: 20 MMOL/L — LOW (ref 22–31)
CREAT SERPL-MCNC: 1.52 MG/DL — HIGH (ref 0.5–1.3)
GLUCOSE BLDC GLUCOMTR-MCNC: 198 MG/DL — HIGH (ref 70–99)
GLUCOSE BLDC GLUCOMTR-MCNC: 218 MG/DL — HIGH (ref 70–99)
GLUCOSE BLDC GLUCOMTR-MCNC: 280 MG/DL — HIGH (ref 70–99)
GLUCOSE BLDC GLUCOMTR-MCNC: 322 MG/DL — HIGH (ref 70–99)
GLUCOSE SERPL-MCNC: 297 MG/DL — HIGH (ref 70–99)
POTASSIUM SERPL-MCNC: 3.5 MMOL/L — SIGNIFICANT CHANGE UP (ref 3.5–5.3)
POTASSIUM SERPL-SCNC: 3.5 MMOL/L — SIGNIFICANT CHANGE UP (ref 3.5–5.3)
SODIUM SERPL-SCNC: 143 MMOL/L — SIGNIFICANT CHANGE UP (ref 135–145)

## 2021-08-06 PROCEDURE — 99232 SBSQ HOSP IP/OBS MODERATE 35: CPT

## 2021-08-06 RX ORDER — INSULIN LISPRO 100/ML
VIAL (ML) SUBCUTANEOUS AT BEDTIME
Refills: 0 | Status: DISCONTINUED | OUTPATIENT
Start: 2021-08-06 | End: 2021-08-13

## 2021-08-06 RX ORDER — INSULIN LISPRO 100/ML
VIAL (ML) SUBCUTANEOUS
Refills: 0 | Status: DISCONTINUED | OUTPATIENT
Start: 2021-08-06 | End: 2021-08-08

## 2021-08-06 RX ORDER — PIPERACILLIN AND TAZOBACTAM 4; .5 G/20ML; G/20ML
3.38 INJECTION, POWDER, LYOPHILIZED, FOR SOLUTION INTRAVENOUS ONCE
Refills: 0 | Status: COMPLETED | OUTPATIENT
Start: 2021-08-06 | End: 2021-08-06

## 2021-08-06 RX ORDER — INSULIN LISPRO 100/ML
3 VIAL (ML) SUBCUTANEOUS
Refills: 0 | Status: DISCONTINUED | OUTPATIENT
Start: 2021-08-06 | End: 2021-08-07

## 2021-08-06 RX ORDER — PIPERACILLIN AND TAZOBACTAM 4; .5 G/20ML; G/20ML
3.38 INJECTION, POWDER, LYOPHILIZED, FOR SOLUTION INTRAVENOUS EVERY 8 HOURS
Refills: 0 | Status: COMPLETED | OUTPATIENT
Start: 2021-08-06 | End: 2021-08-07

## 2021-08-06 RX ORDER — INSULIN GLARGINE 100 [IU]/ML
8 INJECTION, SOLUTION SUBCUTANEOUS AT BEDTIME
Refills: 0 | Status: DISCONTINUED | OUTPATIENT
Start: 2021-08-06 | End: 2021-08-07

## 2021-08-06 RX ORDER — PIPERACILLIN AND TAZOBACTAM 4; .5 G/20ML; G/20ML
INJECTION, POWDER, LYOPHILIZED, FOR SOLUTION INTRAVENOUS
Refills: 0 | Status: DISCONTINUED | OUTPATIENT
Start: 2021-08-06 | End: 2021-08-06

## 2021-08-06 RX ADMIN — SENNA PLUS 2 TABLET(S): 8.6 TABLET ORAL at 21:36

## 2021-08-06 RX ADMIN — PIPERACILLIN AND TAZOBACTAM 25 GRAM(S): 4; .5 INJECTION, POWDER, LYOPHILIZED, FOR SOLUTION INTRAVENOUS at 22:19

## 2021-08-06 RX ADMIN — PIPERACILLIN AND TAZOBACTAM 200 GRAM(S): 4; .5 INJECTION, POWDER, LYOPHILIZED, FOR SOLUTION INTRAVENOUS at 18:12

## 2021-08-06 RX ADMIN — Medication 216 GRAM(S): at 12:24

## 2021-08-06 RX ADMIN — LEVETIRACETAM 400 MILLIGRAM(S): 250 TABLET, FILM COATED ORAL at 21:36

## 2021-08-06 RX ADMIN — Medication 3 UNIT(S): at 18:05

## 2021-08-06 RX ADMIN — BRIMONIDINE TARTRATE 1 DROP(S): 2 SOLUTION/ DROPS OPHTHALMIC at 06:35

## 2021-08-06 RX ADMIN — LATANOPROST 1 DROP(S): 0.05 SOLUTION/ DROPS OPHTHALMIC; TOPICAL at 21:37

## 2021-08-06 RX ADMIN — LEVETIRACETAM 400 MILLIGRAM(S): 250 TABLET, FILM COATED ORAL at 09:50

## 2021-08-06 RX ADMIN — Medication 2 UNIT(S): at 13:19

## 2021-08-06 RX ADMIN — Medication 8: at 09:45

## 2021-08-06 RX ADMIN — Medication 1 MILLIGRAM(S): at 09:50

## 2021-08-06 RX ADMIN — INSULIN GLARGINE 8 UNIT(S): 100 INJECTION, SOLUTION SUBCUTANEOUS at 22:18

## 2021-08-06 RX ADMIN — SODIUM CHLORIDE 125 MILLILITER(S): 9 INJECTION, SOLUTION INTRAVENOUS at 06:59

## 2021-08-06 RX ADMIN — Medication 2: at 18:04

## 2021-08-06 RX ADMIN — Medication 2 UNIT(S): at 09:45

## 2021-08-06 RX ADMIN — Medication 216 GRAM(S): at 06:35

## 2021-08-06 RX ADMIN — Medication 37.5 MILLIGRAM(S): at 18:05

## 2021-08-06 RX ADMIN — DESMOPRESSIN ACETATE 0.1 MILLIGRAM(S): 0.1 TABLET ORAL at 18:04

## 2021-08-06 RX ADMIN — BRIMONIDINE TARTRATE 1 DROP(S): 2 SOLUTION/ DROPS OPHTHALMIC at 18:05

## 2021-08-06 RX ADMIN — Medication 6: at 13:19

## 2021-08-06 RX ADMIN — Medication 37.5 MILLIGRAM(S): at 06:34

## 2021-08-06 RX ADMIN — Medication 216 GRAM(S): at 00:12

## 2021-08-06 RX ADMIN — MIRTAZAPINE 15 MILLIGRAM(S): 45 TABLET, ORALLY DISINTEGRATING ORAL at 21:36

## 2021-08-06 RX ADMIN — DESMOPRESSIN ACETATE 0.1 MILLIGRAM(S): 0.1 TABLET ORAL at 06:34

## 2021-08-06 RX ADMIN — PANTOPRAZOLE SODIUM 40 MILLIGRAM(S): 20 TABLET, DELAYED RELEASE ORAL at 09:47

## 2021-08-06 NOTE — PROGRESS NOTE ADULT - SUBJECTIVE AND OBJECTIVE BOX
Follow Up:  Bacteremia    Interval History: afebrile overnight. no acute overnight events.     REVIEW OF SYSTEMS  [  ] ROS unobtainable because:    [ x ] All other systems negative except as noted below    Constitutional:  [ ] fever [ ] chills  [ ] weight loss  [ ] weakness  Skin:  [ ] rash [ ] phlebitis	  Eyes: [ ] icterus [ ] pain  [ ] discharge	  ENMT: [ ] sore throat  [ ] thrush [ ] ulcers [ ] exudates  Respiratory: [ ] dyspnea [ ] hemoptysis [ ] cough [ ] sputum	  Cardiovascular:  [ ] chest pain [ ] palpitations [ ] edema	  Gastrointestinal:  [ ] nausea [ ] vomiting [ ] diarrhea [ ] constipation [ ] pain	  Genitourinary:  [ ] dysuria [ ] frequency [ ] hematuria [ ] discharge [ ] flank pain  [ ] incontinence  Musculoskeletal:  [ ] myalgias [ ] arthralgias [ ] arthritis  [ ] back pain  Neurological:  [ ] headache [ ] seizures  [ ] confusion/altered mental status    Allergies  No Known Allergies        ANTIMICROBIALS:  ampicillin  IVPB 2 every 6 hours      OTHER MEDS:  MEDICATIONS  (STANDING):  acetaminophen   Tablet .. 650 every 6 hours PRN  desmopressin 0.1 two times a day  dextrose 40% Gel 15 once  dextrose 50% Injectable 25 once  dextrose 50% Injectable 12.5 once  dextrose 50% Injectable 25 once  glucagon  Injectable 1 once  insulin glargine Injectable (LANTUS) 10 at bedtime  insulin lispro (ADMELOG) corrective regimen sliding scale  at bedtime  insulin lispro (ADMELOG) corrective regimen sliding scale  three times a day before meals  insulin lispro Injectable (ADMELOG) 2 three times a day before meals  levETIRAcetam  IVPB 500 every 12 hours  mirtazapine 15 at bedtime  pantoprazole  Injectable 40 daily  senna 2 at bedtime  venlafaxine 37.5 every 12 hours      Vital Signs Last 24 Hrs  T(C): 36.6 (06 Aug 2021 05:43), Max: 36.8 (05 Aug 2021 12:30)  T(F): 97.8 (06 Aug 2021 05:43), Max: 98.2 (05 Aug 2021 12:30)  HR: 101 (06 Aug 2021 05:43) (98 - 102)  BP: 131/69 (06 Aug 2021 05:43) (112/70 - 148/77)  BP(mean): --  RR: 18 (06 Aug 2021 05:43) (18 - 18)  SpO2: 98% (06 Aug 2021 05:43) (97% - 99%)    PHYSICAL EXAMINATION:  General: Alert and Awake, NAD  HEENT: PERRL, EOMI  Neck: Supple  Cardiac: RRR, No M/R/G  Resp: CTAB, No Wh/Rh/Ra  Abdomen: NBS, NT/ND, No HSM, No rigidity or guarding  MSK: No LE edema. No Calf tenderness  : No diaz  Skin: No rashes or lesions. Skin is warm and dry to the touch.   Neuro: Alert and Awake. CN 2-12 Grossly intact. Moves all four extremities spontaneously.  Psych: Calm, Pleasant, Cooperative                          4.4    3.82  )-----------( 124      ( 05 Aug 2021 07:08 )             16.6       08-06    143  |  112<H>  |  34<H>  ----------------------------<  297<H>  3.5   |  20<L>  |  1.52<H>    Ca    8.1<L>      06 Aug 2021 10:03            MICROBIOLOGY:  v  .Blood Blood-Peripheral  08-04-21   No growth to date.  --  --      .Blood Blood-Peripheral  07-31-21   No Growth Final  --  --      .Blood Blood-Peripheral  07-29-21   Growth in aerobic and anaerobic bottles: Enterococcus faecalis  ***Blood Panel PCR results on this specimen are available  approximately 3 hours after the Gram stain result.***  Gram stain, PCR, and/or culture results may not always  correspond dueto difference in methodologies.  ************************************************************  This PCR assay was performed by multiplex PCR. This  Assay tests for 66 bacterial and resistance gene targets.  Please refer to the  Labs test directory  at [email or web address] for details.  --  Blood Culture PCR  Enterococcus faecalis      Catheterized Catheterized  07-28-21   >100,000 CFU/ml Enterococcus faecalis  --  Enterococcus faecalis    RADIOLOGY:    <The imaging below has been reviewed and visualized by me independently. Findings as detailed in report below>    EXAM:  XR CHEST PORTABLE URGENT 1V                        PROCEDURE DATE:  07/30/2021    Feeding tube tip terminates in the stomach.  Small/moderate right pleural effusion and right middle and lower lobe linear opacities, likely atelectasis unchanged.   Follow Up:  Bacteremia    Interval History: afebrile overnight. no acute overnight events. diaz to be removed by urology today.     REVIEW OF SYSTEMS  [  ] ROS unobtainable because:    [ x ] All other systems negative except as noted below    Constitutional:  [ ] fever [ ] chills  [ ] weight loss  [ ] weakness  Skin:  [ ] rash [ ] phlebitis	  Eyes: [ ] icterus [ ] pain  [ ] discharge	  ENMT: [ ] sore throat  [ ] thrush [ ] ulcers [ ] exudates  Respiratory: [ ] dyspnea [ ] hemoptysis [ ] cough [ ] sputum	  Cardiovascular:  [ ] chest pain [ ] palpitations [ ] edema	  Gastrointestinal:  [ ] nausea [ ] vomiting [ ] diarrhea [ ] constipation [ ] pain	  Genitourinary:  [ ] dysuria [ ] frequency [ ] hematuria [ ] discharge [ ] flank pain  [ ] incontinence  Musculoskeletal:  [ ] myalgias [ ] arthralgias [ ] arthritis  [ ] back pain  Neurological:  [ ] headache [ ] seizures  [ ] confusion/altered mental status    Allergies  No Known Allergies        ANTIMICROBIALS:  ampicillin  IVPB 2 every 6 hours      OTHER MEDS:  MEDICATIONS  (STANDING):  acetaminophen   Tablet .. 650 every 6 hours PRN  desmopressin 0.1 two times a day  dextrose 40% Gel 15 once  dextrose 50% Injectable 25 once  dextrose 50% Injectable 12.5 once  dextrose 50% Injectable 25 once  glucagon  Injectable 1 once  insulin glargine Injectable (LANTUS) 10 at bedtime  insulin lispro (ADMELOG) corrective regimen sliding scale  at bedtime  insulin lispro (ADMELOG) corrective regimen sliding scale  three times a day before meals  insulin lispro Injectable (ADMELOG) 2 three times a day before meals  levETIRAcetam  IVPB 500 every 12 hours  mirtazapine 15 at bedtime  pantoprazole  Injectable 40 daily  senna 2 at bedtime  venlafaxine 37.5 every 12 hours      Vital Signs Last 24 Hrs  T(C): 36.6 (06 Aug 2021 05:43), Max: 36.8 (05 Aug 2021 12:30)  T(F): 97.8 (06 Aug 2021 05:43), Max: 98.2 (05 Aug 2021 12:30)  HR: 101 (06 Aug 2021 05:43) (98 - 102)  BP: 131/69 (06 Aug 2021 05:43) (112/70 - 148/77)  BP(mean): --  RR: 18 (06 Aug 2021 05:43) (18 - 18)  SpO2: 98% (06 Aug 2021 05:43) (97% - 99%)    PHYSICAL EXAMINATION:  General: Alert and Awake, NAD  Cardiac: RRR, No M/R/G  Resp: CTAB, No Wh/Rh/Ra  Abdomen: NBS, NT/ND, No HSM, No rigidity or guarding  MSK: No LE edema. No Calf tenderness  Skin: No rashes or lesions. Skin is warm and dry to the touch.   Neuro: Alert and Awake. CN 2-12 Grossly intact. Moves all four extremities spontaneously.  Psych: Calm, Pleasant, Cooperative                          4.4    3.82  )-----------( 124      ( 05 Aug 2021 07:08 )             16.6       08-06    143  |  112<H>  |  34<H>  ----------------------------<  297<H>  3.5   |  20<L>  |  1.52<H>    Ca    8.1<L>      06 Aug 2021 10:03            MICROBIOLOGY:  v  .Blood Blood-Peripheral  08-04-21   No growth to date.  --  --      .Blood Blood-Peripheral  07-31-21   No Growth Final  --  --      .Blood Blood-Peripheral  07-29-21   Growth in aerobic and anaerobic bottles: Enterococcus faecalis  ***Blood Panel PCR results on this specimen are available  approximately 3 hours after the Gram stain result.***  Gram stain, PCR, and/or culture results may not always  correspond dueto difference in methodologies.  ************************************************************  This PCR assay was performed by multiplex PCR. This  Assay tests for 66 bacterial and resistance gene targets.  Please refer to the [facility] Labs test directory  at [email or web address] for details.  --  Blood Culture PCR  Enterococcus faecalis      Catheterized Catheterized  07-28-21   >100,000 CFU/ml Enterococcus faecalis  --  Enterococcus faecalis    RADIOLOGY:    <The imaging below has been reviewed and visualized by me independently. Findings as detailed in report below>    EXAM:  XR CHEST PORTABLE URGENT 1V                        PROCEDURE DATE:  07/30/2021    Feeding tube tip terminates in the stomach.  Small/moderate right pleural effusion and right middle and lower lobe linear opacities, likely atelectasis unchanged.

## 2021-08-06 NOTE — PROGRESS NOTE ADULT - ASSESSMENT
71 yo F Sabianism, hx of breast Ca on active chemo (unsure of name) and anemia p/w reported vaginal bleeding. Recent admission to [facility] for the same, underwent EPO treatment and discharged. Reported Hgb of 5 during admission. Feeling generally weak, denies chest pain sob. Reports bright red blood in the toilet when she urinates. Denies dysuria, abdominal pain. Patient is unable to receive blood products of any kind.  has been tachycardic.

## 2021-08-06 NOTE — CHART NOTE - NSCHARTNOTEFT_GEN_A_CORE
Spoke with primary team.  Goals are established, MOLST complete and in chart. Palliative care will sign off.  Please reconsult if necessary.

## 2021-08-06 NOTE — PROGRESS NOTE ADULT - ASSESSMENT
71 y/o F, Congregational, w/h/o T2DM> HbA1c 6.5% (Skewed due to severe anemia) . DM c/b neuropath, CKD. Also h/o breast Ca on chemo (unsure of name) anemia, HTN, HLD/ Here with severe anemia, acute blood loss 2/2 hematuria> unable ot receive blood. Consult called for management of hyperglycemia. Tolerating POs and eating well per staff. BG above goal while on present insulin doses. Will continue to adjust insulin to BG goal 100 to 180s. Pt on D5 infusion for hypernatremia but is now off. Might need further insulin adjustments. BG goal (100-180mg/dl).

## 2021-08-06 NOTE — PROGRESS NOTE ADULT - ASSESSMENT
69 yo F Sabianism, hx of metastatic breast cancer, anemia p/w hematuria, started on CBI, c/b RRT/ code stroke, presumed ischemic CVA    #Anemia, iron deficiency  - pt is JW, does not accept blood products- established by patient per chart review prior to AMS  - received EPO 40, 000 unit 7/28; will hold on giving higher dose therapy (3x/week) as blood sparing treatment due to acute CVA  - s/p IV iron 200mg daily x 8 doses  - continue folic acid and IM B12  - LIMIT blood draws- no need for routine CBC; agree with Pediatric tubes  - s/p Retacrit 10,000 unit 8/4  - Hg 3.6 on 7/31; improved 4.7 8/3, overall stable 4.4 on 8/5- monitor periodically    #thrombocytopenia- plt fluctuating, not on AC, monitor    #hematuria-  - US/ CT with no intrauterine source, with + bladder hematoma on CBI, was clamped- restarted due to clots  - prior R nephroureteral catheter with mild residual r hydronephrosis; with soft tissue mass surrounding distal right ureter  - Urology is following  - was redosed tranexamic acid at 10mg/kg dose- 500mg IV over 30 minutes on 7/31 with critically low Hg with bleeding, despite risk of thrombosis  - now off CBI, with pink urine, no clots, continue to monitor    #Breast cancer, metastatic- now with widely metastatic disease  - recently established care at [facility], records in [facility] reviewed- initial dx 1996 R breast cancer s/p surgery, CMF, no endocrine therapy; had local recurrence, treated with endocrine agents  - developed Left breast cancer, s/p surgery, Aromasin  - malignant pleural effusion treated with femara/kisquali, followed by faslodex/ibrance; was following with Dr. 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

## 2021-08-06 NOTE — PROGRESS NOTE ADULT - ASSESSMENT
71 yo F Episcopalian, hx of breast Ca on active chemo (unsure of name) and anemia p/w reported vaginal bleeding/hematuria.     Patient with metastatic breast cancer  Now presenting with severe anemia  Found to have CT A/P with nonenhancing debris in the bladder (clot) and soft tissue lesion around the distal right ureter and evidence of likely metastatic neoplasm.     UCx (7/28) with Enterococcus faecalis  BCx (7/29) with Enterococcus faecalis  BCx (7/31) NGTD  BCx (8/4) NGTD    TTE (8/3) was limited and without vegetations but with severe aortic regurgitation  Need for KIM to be determined if repeat cultures are positive and based on course  Will plan for surveillance BCx 2 weeks after antibiotic completion in light of the severe AR (and as we likely cannot safely pursue KIM)    Overall, Enterococcus bacteremia, UTI, Fever, GLEN    --If urology wishes to cover with Zosyn for 24 hours following diaz removal can discontinue ampicillin and start Zosyn. Once Zosyn completes please restart ampicillin   --Continue to follow CBC with diff  --Continue to follow renal function (Cr/BUN)  --Continue to follow transaminases  --Continue to follow temperature curve  --Follow up on prelim BCx     I will continue to follow. Please feel free to contact me with any further questions.    Eric Stewart M.D.  [facility] Division of Infectious Disease  8AM-5PM: Pager Number [phone]  After Hours (or if no response): Please contact the Infectious Diseases Office at [phone]

## 2021-08-06 NOTE — PROGRESS NOTE ADULT - SUBJECTIVE AND OBJECTIVE BOX
Subjective  Urology contacted by primary team due to darker red urine, CBI remained clamped.  Patient denies flank/abdominal pain.  States her urologist is at [place], she does not know who.    Objective    Vital signs  T(F): , Max: 98.1 (08-05-21 @ 17:54)  HR: 99 (08-06-21 @ 12:35)  BP: 118/66 (08-06-21 @ 12:35)  SpO2: 100% (08-06-21 @ 12:35)  Wt(kg): --    Output     OUT:    Ureteral Catheter (mL): 1500 mL  Total OUT: 1500 mL    Total NET: -1500 mL      OUT:    Ureteral Catheter (mL): 700 mL  Total OUT: 700 mL    Total NET: -700 mL          Gen: NAD  Abd: soft, nontender, nondistended; R NT capped  : diaz secured in place, draining murky dark red, no clots, CBI clamped    Labs      08-06 @ 10:03    WBC --    / Hct --    / SCr 1.52     08-05 @ 07:08    WBC 3.82  / Hct 16.6  / SCr --           Culture - Blood (collected 08-04-21 @ 09:07)  Source: .Blood Blood-Venous  Preliminary Report (08-05-21 @ 10:01):    No growth to date.    Culture - Blood (collected 08-04-21 @ 09:07)  Source: .Blood Blood-Peripheral  Preliminary Report (08-05-21 @ 10:01):    No growth to date.    Culture - Blood (collected 07-31-21 @ 12:21)  Source: .Blood Blood-Peripheral  Final Report (08-05-21 @ 13:00):    No Growth Final

## 2021-08-06 NOTE — PROGRESS NOTE ADULT - SUBJECTIVE AND OBJECTIVE BOX
Overnight events noted   VITAL: T(C): , Max: 36.8 (08-05-21 @ 12:30) T(F): , Max: 98.2 (08-05-21 @ 12:30) HR: 101 (08-06-21 @ 05:43) BP: 131/69 (08-06-21 @ 05:43) RR: 18 (08-06-21 @ 05:43) SpO2: 98% (08-06-21 @ 05:43)   PHYSICAL EXAM: Constitutional: alert (improved mentation from yesterday); (+)soft restraints HEENT: DMM, no NGT Neck: Supple, No JVD Respiratory: CTA-b/l Cardiovascular: tachy reg s1s2 Gastrointestinal: BS+, soft, NT/ND : (+)diaz-clear red urine Extremities: No peripheral edema b/l Neurological: no focal deficits; strength grossly intact Back: no CVAT b/l Skin: No rashes, no nevi  LABS:                      4.4   3.82  )-----------( 124      ( 05 Aug 2021 07:08 )            16.6   Na(143)/K(3.5)/Cl(112)/HCO3(20)/BUN(34)/Cr(1.52)Glu(297)/Ca(8.1)/Mg(--)/PO4(--)    08-06 @ 10:03 Na(159)/K(3.2)/Cl(123)/HCO3(23)/BUN(47)/Cr(1.73)Glu(308)/Ca(9.6)/Mg(--)/PO4(--)    08-05 @ 07:06   IMPRESSION: 70F, Baptist, with breast CA, 7/27/21 p/w vaginal bleeding/severe anemia  (1)Renal - GLEN - ischemic ATN - resolving   (2)Hypernatremia - DI - Na much improved as of today, on DDAVP, on D5W @125cc/h, and in setting of her now being able to take fluids by mouth. We can stop the IVF.  (3)Hypokalemia - borderline  (4)ID - enterococcal bacteremia - on IV Ampicillin  (5)Anemia - severe - in setting of being a Baptist. Iron deficiency; not indicated for SAWYER. S/p several doses of IV Venofer   RECOMMEND: (1)D/C IVF (2)DDAVP as ordered (3)Treatment of anemia per primary team (4)Dose new meds for GFR 40-50ml/min (present dosing is acceptable) (5)BMP daily       David Nixon MD [facility] Office: [phone] Cell: [phone]        No pain, no sob   VITAL: T(C): , Max: 36.8 (08-05-21 @ 12:30) T(F): , Max: 98.2 (08-05-21 @ 12:30) HR: 101 (08-06-21 @ 05:43) BP: 131/69 (08-06-21 @ 05:43) RR: 18 (08-06-21 @ 05:43) SpO2: 98% (08-06-21 @ 05:43)   PHYSICAL EXAM: Constitutional: alert; (+)soft restraints HEENT: DMM, no NGT Neck: Supple, No JVD Respiratory: CTA-b/l Cardiovascular: tachy reg s1s2 Gastrointestinal: BS+, soft, NT/ND : (+)diaz-clear red urine Extremities: No peripheral edema b/l Neurological: no focal deficits; strength grossly intact Back: no CVAT b/l Skin: No rashes, no nevi  LABS:                      4.4   3.82  )-----------( 124      ( 05 Aug 2021 07:08 )            16.6   Na(143)/K(3.5)/Cl(112)/HCO3(20)/BUN(34)/Cr(1.52)Glu(297)/Ca(8.1)/Mg(--)/PO4(--)    08-06 @ 10:03 Na(159)/K(3.2)/Cl(123)/HCO3(23)/BUN(47)/Cr(1.73)Glu(308)/Ca(9.6)/Mg(--)/PO4(--)    08-05 @ 07:06   IMPRESSION: 70F, Sikh, with breast CA, 7/27/21 p/w vaginal bleeding/severe anemia  (1)Renal - GLEN - ischemic ATN - resolving   (2)Hypernatremia - DI - Na much improved as of today, on DDAVP, on D5W @125cc/h, and in setting of her now being able to take fluids by mouth. We can stop the IVF.  (3)Hypokalemia - borderline  (4)ID - enterococcal bacteremia - on IV Ampicillin  (5)Anemia - severe - in setting of being a Sikh. Iron deficiency; not indicated for SAWYER. S/p several doses of IV Venofer   RECOMMEND: (1)D/C IVF (2)DDAVP as ordered (3)Treatment of anemia per primary team (4)Dose new meds for GFR 40-50ml/min (present dosing is acceptable) (5)BMP daily       David Nixon MD [facility] Office: [phone] Cell: [phone]        No pain, no sob   VITAL: T(C): , Max: 36.8 (08-05-21 @ 12:30) T(F): , Max: 98.2 (08-05-21 @ 12:30) HR: 101 (08-06-21 @ 05:43) BP: 131/69 (08-06-21 @ 05:43) RR: 18 (08-06-21 @ 05:43) SpO2: 98% (08-06-21 @ 05:43)   PHYSICAL EXAM: Constitutional: alert; (+)soft restraints HEENT: DMM, no NGT Neck: Supple, No JVD Respiratory: CTA-b/l Cardiovascular: tachy reg s1s2 Gastrointestinal: BS+, soft, NT/ND : (+)diaz-clear red urine Extremities: No peripheral edema b/l Neurological: no focal deficits; strength grossly intact Back: no CVAT b/l Skin: No rashes, no nevi  LABS:                      4.4   3.82  )-----------( 124      ( 05 Aug 2021 07:08 )            16.6   Na(143)/K(3.5)/Cl(112)/HCO3(20)/BUN(34)/Cr(1.52)Glu(297)/Ca(8.1)/Mg(--)/PO4(--)    08-06 @ 10:03 Na(159)/K(3.2)/Cl(123)/HCO3(23)/BUN(47)/Cr(1.73)Glu(308)/Ca(9.6)/Mg(--)/PO4(--)    08-05 @ 07:06   IMPRESSION: 70F, Zoroastrianism, with breast CA, 7/27/21 p/w vaginal bleeding/severe anemia  (1)Renal - GLEN - ischemic ATN - resolving   (2)Hypernatremia - DI - Na much improved as of today, on DDAVP, on D5W @125cc/h, and in setting of her now being able to take fluids by mouth. We can stop the IVF.  (3)Hypokalemia - borderline  (4)ID - enterococcal bacteremia - on IV Ampicillin  (5)Anemia - severe - in setting of being a Zoroastrianism. Iron deficiency; not indicated for SAWYER. S/p several doses of IV Venofer   RECOMMEND: (1)D/C IVF (2)DDAVP as ordered (3)Treatment of anemia per primary team (4)Dose new meds for GFR 40-50ml/min (present dosing is acceptable) (5)BMP daily (6)PT eunice Nixon MD [facility] Office: [phone] Cell: [phone]

## 2021-08-06 NOTE — PROGRESS NOTE ADULT - SUBJECTIVE AND OBJECTIVE BOX
Chief Complaint: fu    History of Present Illness: pt alert and oriented, asking about going home; no f/c, no cp/dyspnea, no n/v/abd pain, taking PO, reports pink urine without clots, no other bleeding      MEDICATIONS  (STANDING):  ampicillin  IVPB 2 Gram(s) IV Intermittent every 6 hours  brimonidine 0.2% Ophthalmic Solution 1 Drop(s) Both EYES two times a day  desmopressin 0.1 milliGRAM(s) Oral two times a day  dextrose 40% Gel 15 Gram(s) Oral once  dextrose 5%. 1000 milliLiter(s) (50 mL/Hr) IV Continuous <Continuous>  dextrose 5%. 1000 milliLiter(s) (100 mL/Hr) IV Continuous <Continuous>  dextrose 50% Injectable 25 Gram(s) IV Push once  dextrose 50% Injectable 12.5 Gram(s) IV Push once  dextrose 50% Injectable 25 Gram(s) IV Push once  folic acid 1 milliGRAM(s) Enteral Tube daily  glucagon  Injectable 1 milliGRAM(s) IntraMuscular once  insulin glargine Injectable (LANTUS) 8 Unit(s) SubCutaneous at bedtime  insulin lispro (ADMELOG) corrective regimen sliding scale   SubCutaneous three times a day before meals  insulin lispro (ADMELOG) corrective regimen sliding scale   SubCutaneous at bedtime  insulin lispro Injectable (ADMELOG) 3 Unit(s) SubCutaneous three times a day before meals  latanoprost 0.005% Ophthalmic Solution 1 Drop(s) Both EYES at bedtime  levETIRAcetam  IVPB 500 milliGRAM(s) IV Intermittent every 12 hours  mirtazapine 15 milliGRAM(s) Oral at bedtime  pantoprazole  Injectable 40 milliGRAM(s) IV Push daily  senna 2 Tablet(s) Oral at bedtime  venlafaxine 37.5 milliGRAM(s) Oral every 12 hours    MEDICATIONS  (PRN):  acetaminophen   Tablet .. 650 milliGRAM(s) Oral every 6 hours PRN Temp greater or equal to 38C (100.4F), Mild Pain (1 - 3)  artificial  tears Solution 1 Drop(s) Both EYES four times a day PRN Dry Eyes      Allergies    No Known Allergies    Intolerances        Vital Signs Last 24 Hrs  T(C): 36.6 (06 Aug 2021 12:35), Max: 36.7 (05 Aug 2021 17:54)  T(F): 97.9 (06 Aug 2021 12:35), Max: 98.1 (05 Aug 2021 17:54)  HR: 99 (06 Aug 2021 12:35) (98 - 102)  BP: 118/66 (06 Aug 2021 12:35) (112/70 - 148/77)  BP(mean): --  RR: 18 (06 Aug 2021 12:35) (18 - 18)  SpO2: 100% (06 Aug 2021 12:35) (98% - 100%)    PHYSICAL EXAM  General: adult in NAD  HEENT: clear oropharynx, anicteric sclera, pink conjunctiva  Neck: supple  CV: normal S1/S2   Lungs: clear to auscultation, no wheezes, no rales  Abdomen: soft non-tender non-distended,  positive bowel sounds  Ext: no clubbing cyanosis or edema  Skin: no rashes and no petechiae  Lymph Nodes: No LAD in neck  Neuro: alert and oriented    LABS:                          4.4    3.82  )-----------( 124      ( 05 Aug 2021 07:08 )             16.6         Mean Cell Volume : 133.9 fl  Mean Cell Hemoglobin : 35.5 pg  Mean Cell Hemoglobin Concentration : 26.5 gm/dL  Auto Neutrophil # : x  Auto Lymphocyte # : x  Auto Monocyte # : x  Auto Eosinophil # : x  Auto Basophil # : x  Auto Neutrophil % : x  Auto Lymphocyte % : x  Auto Monocyte % : x  Auto Eosinophil % : x  Auto Basophil % : x      Serial CBC's  08-05 @ 07:08  Hct-16.6 / Hgb-4.4 / Plat-124 / RBC-1.24 / WBC-3.82  Serial CBC's  08-03 @ 08:17  Hct-16.4 / Hgb-4.7 / Plat-153 / RBC-1.31 / WBC-4.63      08-06    143  |  112<H>  |  34<H>  ----------------------------<  297<H>  3.5   |  20<L>  |  1.52<H>    Ca    8.1<L>      06 Aug 2021 10:03                        Radiology:

## 2021-08-06 NOTE — PROGRESS NOTE ADULT - ASSESSMENT
71 yo Female Jehovah witness with PMHx of breast CA s/p BL mastectomy with recurrence and metastasis here with c/o vaginal bleeding, found to have bladder hematoma on pelvic ultrasound. Pt also with R sided nephU with mild hydro, but pt unsure indication for nephU.   on admission: H/H 4.3/13.5, SCr 1.37.  Pt follows up at [facility] and is currently receiving chemotherapy.     7/28: Hct 11.2 from 13.5 yesterday. ptn is pale, lethargic, dyspneic, expresses wishes not to have any blood products based on her GD DOV. ptn placed her proxy on the phone Mr. Gray who wanted to confirm the hospital is respecting ptn's wishes. both ptn and the proxy aksed baout being transferred to [facility]. however ptn is not stable w HGB of 3 to be travelling. denies CP, palps, HA, has ongoing hematuria, CBI in place. Ct C/A/P reviewed. ptn w metastatic dz process on CT scan. Heme consult called. will cont CBI as per . pending CT urogram. cont trending HH. started on IV IRON. prognosis is guarded.   7/29: ptn found unresponsive with a droop, fever, ongoing hematuria, had filled out a MOLST form , she is DNR/DNI, code stroke called, will give Abx, keep temp down w cooling blanket, since ptn cannot receive anticoagulation/is dnr/dni will not get CTA of brain, will place on KEPPRA for sz, get eeg, prognosis is grave. this was d/w ,Mr Gray ptn's proxy. MICU consult reviewed. prognosis is grave  7/30:   ptn is morwe responsive today though minimally, has enterococcal bacteremia. seen by ID , placed on IV AMPICILLIN while awaiting sensitivities. rpt blood cx sent. will check HH in am. all blood draws should be done in pediatric VILES . GLEN, ongoing 2/2 ischemic ATN, hematuria improving  7/31:  little more responsive today, has hypernatremia, will start free water via NGT. has clots in CBI. rpt hgb3.6, ptn is hyperglycemic. will call endo  8/1: ptn is lethargic, hematuria has improved, now off  CBI, ongoing GLEN and hypernatremia though improved, heme, renal  following. Hyperglycemia, will dc d5W drip, place on standing LANTUS , cont Admelog on a sliding scale, Dr. Guthrie from [facility] called. increase free water intake to 250 q4H per NGT. rpt cbc and BMP in am via pediatric tubes. tranexamic acid, EPO, IV iron, folic acid and B12 as per heme  8/2: awake, alert, responds appropriately. on and off confusion, diaz in place w hematuria, no clots. off CBI.  ptn refused Blood work today. on NGT feeds w free water per NGT, hyperglycemia is ongoing, not controlled, endo on board. will DC IVF  8/3: ptn more awake and alert. on and off confusion. HH improving. heme following, diaz w pink urine. hypernatremia worsening despite FREE water via NGT  8/4: pulled NGT. will need to replace. ptn had filled out a MOLST form prior to arrival. no artifical feeds. remain NPO 2/2 failed swallow study. check HH and Na tomorrow. on IVF. getting procrit today. has pink urine in diaz  8/5: passed S&S, placed on a soft diet, has DI as per renal, placed on DDAVP, hence persistent hypernatremia. on IV ABx for bacteremia, rpt blood cx neg. Diaz in place, pink clear urine. HH is still quite low, on IV iron and procrit.   8/6: dc joe TOV as per urology. ABx as per ID. ptn wants to go home. check HH tomorrow. will need home w hospice. once ptn is on hospice i dont think PT is cont, will need to check.  will inquire about SRINI

## 2021-08-06 NOTE — PROGRESS NOTE ADULT - SUBJECTIVE AND OBJECTIVE BOX
Subjective: Patient seen and examined. No new events except as noted.     REVIEW OF SYSTEMS:    CONSTITUTIONAL: + weakness, fevers or chills  EYES/ENT: No visual changes;  No vertigo or throat pain   NECK: No pain or stiffness  RESPIRATORY: No cough, wheezing, hemoptysis; No shortness of breath  CARDIOVASCULAR: No chest pain or palpitations  GASTROINTESTINAL: No abdominal or epigastric pain. No nausea, vomiting, or hematemesis; No diarrhea or constipation. No melena or hematochezia.  GENITOURINARY: No dysuria, frequency or hematuria  NEUROLOGICAL: No numbness or weakness  SKIN: No itching, burning, rashes, or lesions   All other review of systems is negative unless indicated above.    MEDICATIONS:  MEDICATIONS  (STANDING):  ampicillin  IVPB 2 Gram(s) IV Intermittent every 6 hours  brimonidine 0.2% Ophthalmic Solution 1 Drop(s) Both EYES two times a day  desmopressin 0.1 milliGRAM(s) Oral two times a day  dextrose 40% Gel 15 Gram(s) Oral once  dextrose 5%. 1000 milliLiter(s) (125 mL/Hr) IV Continuous <Continuous>  dextrose 5%. 1000 milliLiter(s) (50 mL/Hr) IV Continuous <Continuous>  dextrose 5%. 1000 milliLiter(s) (100 mL/Hr) IV Continuous <Continuous>  dextrose 50% Injectable 25 Gram(s) IV Push once  dextrose 50% Injectable 12.5 Gram(s) IV Push once  dextrose 50% Injectable 25 Gram(s) IV Push once  folic acid 1 milliGRAM(s) Enteral Tube daily  glucagon  Injectable 1 milliGRAM(s) IntraMuscular once  insulin glargine Injectable (LANTUS) 10 Unit(s) SubCutaneous at bedtime  insulin lispro (ADMELOG) corrective regimen sliding scale   SubCutaneous at bedtime  insulin lispro (ADMELOG) corrective regimen sliding scale   SubCutaneous three times a day before meals  insulin lispro Injectable (ADMELOG) 2 Unit(s) SubCutaneous three times a day before meals  latanoprost 0.005% Ophthalmic Solution 1 Drop(s) Both EYES at bedtime  levETIRAcetam  IVPB 500 milliGRAM(s) IV Intermittent every 12 hours  mirtazapine 15 milliGRAM(s) Oral at bedtime  pantoprazole  Injectable 40 milliGRAM(s) IV Push daily  senna 2 Tablet(s) Oral at bedtime  venlafaxine 37.5 milliGRAM(s) Oral every 12 hours      PHYSICAL EXAM:  T(C): 36.6 (08-06-21 @ 05:43), Max: 36.8 (08-05-21 @ 12:30)  HR: 101 (08-06-21 @ 05:43) (98 - 102)  BP: 131/69 (08-06-21 @ 05:43) (112/70 - 148/77)  RR: 18 (08-06-21 @ 05:43) (18 - 18)  SpO2: 98% (08-06-21 @ 05:43) (97% - 99%)  Wt(kg): --  I&O's Summary    05 Aug 2021 07:01  -  06 Aug 2021 07:00  --------------------------------------------------------  IN: 4280 mL / OUT: 1500 mL / NET: 2780 mL    06 Aug 2021 07:01  -  06 Aug 2021 09:34  --------------------------------------------------------  IN: 0 mL / OUT: 400 mL / NET: -400 mL            Appearance: NAD  HEENT:   Dry  oral mucosa	  Lymphatic: No lymphadenopathy , no edema  Cardiovascular: Normal S1 S2, No JVD, No murmurs , Peripheral pulses palpable 2+ bilaterally  Respiratory: Decreased bs   Gastrointestinal:  Soft, Non-tender, + BS	  Skin: No rashes, No ecchymoses, No cyanosis, warm to touch  Musculoskeletal: Decreased range of motion and  strength  Psychiatry:  sleepy lethargic   Ext: No edema  +diaz Hematuria         LABS:    CARDIAC MARKERS:                                4.4    3.82  )-----------( 124      ( 05 Aug 2021 07:08 )             16.6     08-05    159<H>  |  123<H>  |  47<H>  ----------------------------<  308<H>  3.2<L>   |  23  |  1.73<H>    Ca    9.6      05 Aug 2021 07:06      proBNP:   Lipid Profile:   HgA1c:   TSH:       TELEMETRY: 	    ECG:  	  RADIOLOGY:   DIAGNOSTIC TESTING:  [ ] Echocardiogram:  [ ]  Catheterization:  [ ] Stress Test:    OTHER:

## 2021-08-06 NOTE — PROGRESS NOTE ADULT - SUBJECTIVE AND OBJECTIVE BOX
Patient is a 70y old  Female who presents with a chief complaint of severe anemia, acute blood loss 2/2 hematuria (06 Aug 2021 15:39)      SUBJECTIVE / OVERNIGHT EVENTS: no new events    MEDICATIONS  (STANDING):  brimonidine 0.2% Ophthalmic Solution 1 Drop(s) Both EYES two times a day  desmopressin 0.1 milliGRAM(s) Oral two times a day  dextrose 40% Gel 15 Gram(s) Oral once  dextrose 5%. 1000 milliLiter(s) (50 mL/Hr) IV Continuous <Continuous>  dextrose 5%. 1000 milliLiter(s) (100 mL/Hr) IV Continuous <Continuous>  dextrose 50% Injectable 25 Gram(s) IV Push once  dextrose 50% Injectable 12.5 Gram(s) IV Push once  dextrose 50% Injectable 25 Gram(s) IV Push once  folic acid 1 milliGRAM(s) Enteral Tube daily  glucagon  Injectable 1 milliGRAM(s) IntraMuscular once  insulin glargine Injectable (LANTUS) 8 Unit(s) SubCutaneous at bedtime  insulin lispro (ADMELOG) corrective regimen sliding scale   SubCutaneous three times a day before meals  insulin lispro (ADMELOG) corrective regimen sliding scale   SubCutaneous at bedtime  insulin lispro Injectable (ADMELOG) 3 Unit(s) SubCutaneous three times a day before meals  latanoprost 0.005% Ophthalmic Solution 1 Drop(s) Both EYES at bedtime  levETIRAcetam  IVPB 500 milliGRAM(s) IV Intermittent every 12 hours  mirtazapine 15 milliGRAM(s) Oral at bedtime  pantoprazole  Injectable 40 milliGRAM(s) IV Push daily  piperacillin/tazobactam IVPB.. 3.375 Gram(s) IV Intermittent every 8 hours  senna 2 Tablet(s) Oral at bedtime  venlafaxine 37.5 milliGRAM(s) Oral every 12 hours    MEDICATIONS  (PRN):  acetaminophen   Tablet .. 650 milliGRAM(s) Oral every 6 hours PRN Temp greater or equal to 38C (100.4F), Mild Pain (1 - 3)  artificial  tears Solution 1 Drop(s) Both EYES four times a day PRN Dry Eyes      Vital Signs Last 24 Hrs  T(F): 97.9 (08-06-21 @ 12:35), Max: 97.9 (08-06-21 @ 12:35)  HR: 99 (08-06-21 @ 12:35) (98 - 101)  BP: 118/66 (08-06-21 @ 12:35) (112/70 - 131/69)  RR: 18 (08-06-21 @ 12:35) (18 - 18)  SpO2: 100% (08-06-21 @ 12:35) (98% - 100%)  Telemetry:   CAPILLARY BLOOD GLUCOSE      POCT Blood Glucose.: 198 mg/dL (06 Aug 2021 17:59)  POCT Blood Glucose.: 280 mg/dL (06 Aug 2021 13:05)  POCT Blood Glucose.: 322 mg/dL (06 Aug 2021 09:16)  POCT Blood Glucose.: 263 mg/dL (05 Aug 2021 21:07)    I&O's Summary    05 Aug 2021 07:01  -  06 Aug 2021 07:00  --------------------------------------------------------  IN: 4280 mL / OUT: 1500 mL / NET: 2780 mL    06 Aug 2021 07:01  -  06 Aug 2021 19:00  --------------------------------------------------------  IN: 0 mL / OUT: 700 mL / NET: -700 mL        PHYSICAL EXAM:  GENERAL: NAD, well-developed  HEAD:  Atraumatic, Normocephalic  EYES: EOMI, PERRLA, conjunctiva and sclera clear  NECK: Supple, No JVD  CHEST/LUNG: Clear to auscultation bilaterally; No wheeze  HEART: Regular rate and rhythm; No murmurs, rubs, or gallops  ABDOMEN: Soft, Nontender, Nondistended; Bowel sounds present  EXTREMITIES:  2+ Peripheral Pulses, No clubbing, cyanosis, or edema  PSYCH: AAOx3  NEUROLOGY: non-focal  SKIN: No rashes or lesions    LABS:                        4.4    3.82  )-----------( 124      ( 05 Aug 2021 07:08 )             16.6     08-06    143  |  112<H>  |  34<H>  ----------------------------<  297<H>  3.5   |  20<L>  |  1.52<H>    Ca    8.1<L>      06 Aug 2021 10:03                RADIOLOGY & ADDITIONAL TESTS:    Imaging Personally Reviewed:    Consultant(s) Notes Reviewed:      Care Discussed with Consultants/Other Providers:

## 2021-08-06 NOTE — PROGRESS NOTE ADULT - PROBLEM SELECTOR PLAN 1
-test BG AC/HS  -Change Lantus to 8 units QHS since pt is off D5 infusion and BG should improve  -Increase Admelog to 3 units AC meals for now since  PO intake is improving  -C/w Admelog modified correction scale AC (2-7 units) and low HS scale for now in case pt remains hyperglycemic on able doses  -Will adjust insulin doses as needed  Disposition  -Will depend on PO/TF status, insulin requirements and BG levels.  -Plan discussed with pt/team.  Contact info: [phone] (24/7). pager [phone]

## 2021-08-06 NOTE — PROGRESS NOTE ADULT - SUBJECTIVE AND OBJECTIVE BOX
DIABETES FOLLOW UP NOTE: Saw pt earlier today  INTERVAL HX: Pt stable, able to answer simple questions but gets easily confused. BGs remain elevated 200s to 300s. Tolerating POs . No hypoglycemia.       Review of Systems:  General: As above  Cardiovascular: No chest pain, palpitations  Respiratory: No SOB, no cough  GI: No nausea, vomiting, abdominal pain  Endocrine: No  S&Sx of hypoglycemia    Allergies    No Known Allergies    Intolerances      MEDICATIONS:  ampicillin  IVPB 2 Gram(s) IV Intermittent every 6 hours  insulin glargine Injectable (LANTUS) 10 Unit(s) SubCutaneous at bedtime  insulin lispro (ADMELOG) corrective regimen sliding scale   SubCutaneous at bedtime  insulin lispro (ADMELOG) corrective regimen sliding scale   SubCutaneous three times a day before meals  insulin lispro Injectable (ADMELOG) 2 Unit(s) SubCutaneous three times a day before meals      PHYSICAL EXAM:  VITALS: T(C): 36.6 (08-06-21 @ 12:35)  T(F): 97.9 (08-06-21 @ 12:35), Max: 98.1 (08-05-21 @ 17:54)  HR: 99 (08-06-21 @ 12:35) (98 - 102)  BP: 118/66 (08-06-21 @ 12:35) (112/70 - 148/77)  RR:  (18 - 18)  SpO2:  (98% - 100%)  Wt(kg): --  GENERAL: Female laying in bed in NAD  Abdomen: Soft, nontender, non distended  Extremities: Warm, no edema in all 4 exts  NEURO: Alert  but only able to answer simple questions. Forgetful and gets easily confused    LABS:  POCT Blood Glucose.: 280 mg/dL (08-06-21 @ 13:05)  POCT Blood Glucose.: 322 mg/dL (08-06-21 @ 09:16)  POCT Blood Glucose.: 263 mg/dL (08-05-21 @ 21:07)  POCT Blood Glucose.: 313 mg/dL (08-05-21 @ 17:17)  POCT Blood Glucose.: 346 mg/dL (08-05-21 @ 12:59)  POCT Blood Glucose.: 360 mg/dL (08-05-21 @ 09:15)  POCT Blood Glucose.: 362 mg/dL (08-04-21 @ 22:39)  POCT Blood Glucose.: 385 mg/dL (08-04-21 @ 21:05)  POCT Blood Glucose.: 290 mg/dL (08-04-21 @ 17:11)  POCT Blood Glucose.: 362 mg/dL (08-04-21 @ 12:31)  POCT Blood Glucose.: 173 mg/dL (08-04-21 @ 06:11)  POCT Blood Glucose.: 173 mg/dL (08-04-21 @ 04:01)  POCT Blood Glucose.: 269 mg/dL (08-04-21 @ 00:30)  POCT Blood Glucose.: 264 mg/dL (08-03-21 @ 17:38)                            4.4    3.82  )-----------( 124      ( 05 Aug 2021 07:08 )             16.6       08-06    143  |  112<H>  |  34<H>  ----------------------------<  297<H>  3.5   |  20<L>  |  1.52<H>  EGFR if non : 34<L>    Ca    8.1<L>      08-06        Thyroid Function Tests:  07-29 @ 19:56 TSH 2.45 FreeT4 -- T3 -- Anti TPO -- Anti Thyroglobulin Ab -- TSI --      A1C with Estimated Average Glucose Result: 6.5 % (07-28-21 @ 09:21)    Estimated Average Glucose: 140 mg/dL (07-28-21 @ 09:21)    08-05 Chol 154 Direct LDL -- LDL calculated 93 HDL 28<L> Trig 165<H>

## 2021-08-06 NOTE — PROGRESS NOTE ADULT - ASSESSMENT
71 yo Female Jehovah witness with PMHx of breast CA s/p BL mastectomy with recurrence and metastasis here with c/o vaginal bleeding, found to have bladder hematoma on pelvic ultrasound. Pt also with R sided nephU with mild hydro, but pt unsure indication for nephU. H/H 4.3/13.5, SCr 1.37. Pt follows up at [facility] and is currently receiving chemotherapy.   CT urogram reviewed, demonstrates soft tissue enhancement at distal R ureter (possible metastatic disease?), mild right hydro with R nephroU in place, L kidney atrophy, debris within bladder consistent with clot.  Catheter irrigated recently by PA, no clots just dark red urine.  GLEN improving.      - Recommend diaz removal with trial of void given lack of clots in urine (diaz may be irritating friable bladder wall)  - Bladder scan immediately after void and document post-void residual volume in flowsheets  - Please give zosyn x24h for prophylactic antibiotics with removal of diaz catheter  - Document urine output in flowsheets  - Monitor urine color and output  - Increase hydration  - Bowel regimen  - Contacted primary urologist, Dr. Klein ([facility]) - R nephro-U for R distal obstruction (met?), hematuria and anemia is chronic --> no further work up or investigations necessary at this time  - Discussed with attending, Dr. Kemp

## 2021-08-07 LAB
GLUCOSE BLDC GLUCOMTR-MCNC: 111 MG/DL — HIGH (ref 70–99)
GLUCOSE BLDC GLUCOMTR-MCNC: 246 MG/DL — HIGH (ref 70–99)
GLUCOSE BLDC GLUCOMTR-MCNC: 252 MG/DL — HIGH (ref 70–99)
GLUCOSE BLDC GLUCOMTR-MCNC: 323 MG/DL — HIGH (ref 70–99)

## 2021-08-07 PROCEDURE — 99232 SBSQ HOSP IP/OBS MODERATE 35: CPT

## 2021-08-07 RX ORDER — INSULIN LISPRO 100/ML
5 VIAL (ML) SUBCUTANEOUS
Refills: 0 | Status: DISCONTINUED | OUTPATIENT
Start: 2021-08-07 | End: 2021-08-08

## 2021-08-07 RX ORDER — INSULIN GLARGINE 100 [IU]/ML
7 INJECTION, SOLUTION SUBCUTANEOUS AT BEDTIME
Refills: 0 | Status: DISCONTINUED | OUTPATIENT
Start: 2021-08-07 | End: 2021-08-08

## 2021-08-07 RX ORDER — AMPICILLIN TRIHYDRATE 250 MG
2 CAPSULE ORAL EVERY 6 HOURS
Refills: 0 | Status: COMPLETED | OUTPATIENT
Start: 2021-08-08 | End: 2021-08-12

## 2021-08-07 RX ADMIN — LEVETIRACETAM 400 MILLIGRAM(S): 250 TABLET, FILM COATED ORAL at 21:08

## 2021-08-07 RX ADMIN — BRIMONIDINE TARTRATE 1 DROP(S): 2 SOLUTION/ DROPS OPHTHALMIC at 18:08

## 2021-08-07 RX ADMIN — LATANOPROST 1 DROP(S): 0.05 SOLUTION/ DROPS OPHTHALMIC; TOPICAL at 21:08

## 2021-08-07 RX ADMIN — Medication 37.5 MILLIGRAM(S): at 18:08

## 2021-08-07 RX ADMIN — Medication 3: at 13:18

## 2021-08-07 RX ADMIN — Medication 3 UNIT(S): at 09:22

## 2021-08-07 RX ADMIN — Medication 5: at 18:09

## 2021-08-07 RX ADMIN — DESMOPRESSIN ACETATE 0.1 MILLIGRAM(S): 0.1 TABLET ORAL at 06:07

## 2021-08-07 RX ADMIN — DESMOPRESSIN ACETATE 0.1 MILLIGRAM(S): 0.1 TABLET ORAL at 18:08

## 2021-08-07 RX ADMIN — Medication 5 UNIT(S): at 18:09

## 2021-08-07 RX ADMIN — Medication 37.5 MILLIGRAM(S): at 06:07

## 2021-08-07 RX ADMIN — LEVETIRACETAM 400 MILLIGRAM(S): 250 TABLET, FILM COATED ORAL at 09:25

## 2021-08-07 RX ADMIN — Medication 1: at 21:49

## 2021-08-07 RX ADMIN — BRIMONIDINE TARTRATE 1 DROP(S): 2 SOLUTION/ DROPS OPHTHALMIC at 06:08

## 2021-08-07 RX ADMIN — PIPERACILLIN AND TAZOBACTAM 25 GRAM(S): 4; .5 INJECTION, POWDER, LYOPHILIZED, FOR SOLUTION INTRAVENOUS at 13:17

## 2021-08-07 RX ADMIN — Medication 1 MILLIGRAM(S): at 09:25

## 2021-08-07 RX ADMIN — INSULIN GLARGINE 7 UNIT(S): 100 INJECTION, SOLUTION SUBCUTANEOUS at 21:49

## 2021-08-07 RX ADMIN — MIRTAZAPINE 15 MILLIGRAM(S): 45 TABLET, ORALLY DISINTEGRATING ORAL at 21:08

## 2021-08-07 RX ADMIN — PANTOPRAZOLE SODIUM 40 MILLIGRAM(S): 20 TABLET, DELAYED RELEASE ORAL at 09:25

## 2021-08-07 RX ADMIN — PIPERACILLIN AND TAZOBACTAM 25 GRAM(S): 4; .5 INJECTION, POWDER, LYOPHILIZED, FOR SOLUTION INTRAVENOUS at 06:06

## 2021-08-07 RX ADMIN — Medication 3 UNIT(S): at 13:18

## 2021-08-07 NOTE — PROGRESS NOTE ADULT - ASSESSMENT
71 yo Female Jehovah witness with PMHx of breast CA s/p BL mastectomy with recurrence and metastasis here with c/o vaginal bleeding, found to have bladder hematoma on pelvic ultrasound. Pt also with R sided nephU with mild hydro, but pt unsure indication for nephU.   on admission: H/H 4.3/13.5, SCr 1.37.  Pt follows up at [facility] and is currently receiving chemotherapy.     7/28: Hct 11.2 from 13.5 yesterday. ptn is pale, lethargic, dyspneic, expresses wishes not to have any blood products based on her GD DOV. ptn placed her proxy on the phone Mr. Gray who wanted to confirm the hospital is respecting ptn's wishes. both ptn and the proxy aksed baout being transferred to [facility]. however ptn is not stable w HGB of 3 to be travelling. denies CP, palps, HA, has ongoing hematuria, CBI in place. Ct C/A/P reviewed. ptn w metastatic dz process on CT scan. Heme consult called. will cont CBI as per . pending CT urogram. cont trending HH. started on IV IRON. prognosis is guarded.   7/29: ptn found unresponsive with a droop, fever, ongoing hematuria, had filled out a MOLST form , she is DNR/DNI, code stroke called, will give Abx, keep temp down w cooling blanket, since ptn cannot receive anticoagulation/is dnr/dni will not get CTA of brain, will place on KEPPRA for sz, get eeg, prognosis is grave. this was d/w ,Mr Gray ptn's proxy. MICU consult reviewed. prognosis is grave  7/30:   ptn is morwe responsive today though minimally, has enterococcal bacteremia. seen by ID , placed on IV AMPICILLIN while awaiting sensitivities. rpt blood cx sent. will check HH in am. all blood draws should be done in pediatric VILES . GLEN, ongoing 2/2 ischemic ATN, hematuria improving  7/31:  little more responsive today, has hypernatremia, will start free water via NGT. has clots in CBI. rpt hgb3.6, ptn is hyperglycemic. will call endo  8/1: ptn is lethargic, hematuria has improved, now off  CBI, ongoing GLEN and hypernatremia though improved, heme, renal  following. Hyperglycemia, will dc d5W drip, place on standing LANTUS , cont Admelog on a sliding scale, Dr. Guthrie from [facility] called. increase free water intake to 250 q4H per NGT. rpt cbc and BMP in am via pediatric tubes. tranexamic acid, EPO, IV iron, folic acid and B12 as per heme  8/2: awake, alert, responds appropriately. on and off confusion, diaz in place w hematuria, no clots. off CBI.  ptn refused Blood work today. on NGT feeds w free water per NGT, hyperglycemia is ongoing, not controlled, endo on board. will DC IVF  8/3: ptn more awake and alert. on and off confusion. HH improving. heme following, diaz w pink urine. hypernatremia worsening despite FREE water via NGT  8/4: pulled NGT. will need to replace. ptn had filled out a MOLST form prior to arrival. no artifical feeds. remain NPO 2/2 failed swallow study. check HH and Na tomorrow. on IVF. getting procrit today. has pink urine in diaz  8/5: passed S&S, placed on a soft diet, has DI as per renal, placed on DDAVP, hence persistent hypernatremia. on IV ABx for bacteremia, rpt blood cx neg. Diaz in place, pink clear urine. HH is still quite low, on IV iron and procrit.   8/6: dc diaz, TOV as per urology. ABx as per ID. ptn wants to go home. check HH tomorrow. will need home w hospice. once ptn is on hospice i dont think PT is cont, will need to check.  will inquire about SRINI  8/7: ptn states she feels fine. wants to go home. GLEN is improving. on DDAVP for DI post CVA. hospice consult palced

## 2021-08-07 NOTE — PROGRESS NOTE ADULT - ASSESSMENT
71 yo Female Jehovah witness with PMHx of breast CA s/p BL mastectomy with recurrence and metastasis here with c/o vaginal bleeding, found to have bladder hematoma on pelvic ultrasound. Pt also with R sided nephU with mild hydro, but pt unsure indication for nephU. H/H 4.3/13.5, SCr 1.37. Pt follows up at [facility] and is currently receiving chemotherapy.     - Passed TOV   - Document urine output in flowsheets  - Monitor urine color and output  - Increase hydration  - Bowel regimen  - Contacted primary urologist, Dr. Klein ([facility]) - R nephro-U for R distal obstruction (met?), hematuria and anemia is chronic --> no further work up or investigations necessary at this time  - Pt should f/u with outpatient urology at d/c

## 2021-08-07 NOTE — PROGRESS NOTE ADULT - ASSESSMENT
69 y/o F, [place], w/h/o T2DM> HbA1c 6.5% (Skewed due to severe anemia) . DM c/b neuropath, CKD. Also h/o breast Ca on chemo (unsure of name) anemia, HTN, HLD/ Here with severe anemia, acute blood loss 2/2 hematuria> unable ot receive blood. Consult called for management of hyperglycemia. Tolerating POs w/BG values above goal. BG goal (100-180mg/dl).

## 2021-08-07 NOTE — PROGRESS NOTE ADULT - SUBJECTIVE AND OBJECTIVE BOX
Patient is a 70y old  Female who presents with a chief complaint of severe anemia, acute blood loss 2/2 hematuria (07 Aug 2021 13:34)      SUBJECTIVE / OVERNIGHT EVENTS: ptn wants to go home. will consult home hospice. glucose still not controlled    MEDICATIONS  (STANDING):  brimonidine 0.2% Ophthalmic Solution 1 Drop(s) Both EYES two times a day  desmopressin 0.1 milliGRAM(s) Oral two times a day  dextrose 40% Gel 15 Gram(s) Oral once  dextrose 5%. 1000 milliLiter(s) (50 mL/Hr) IV Continuous <Continuous>  dextrose 5%. 1000 milliLiter(s) (100 mL/Hr) IV Continuous <Continuous>  dextrose 50% Injectable 25 Gram(s) IV Push once  dextrose 50% Injectable 12.5 Gram(s) IV Push once  dextrose 50% Injectable 25 Gram(s) IV Push once  folic acid 1 milliGRAM(s) Enteral Tube daily  glucagon  Injectable 1 milliGRAM(s) IntraMuscular once  insulin glargine Injectable (LANTUS) 7 Unit(s) SubCutaneous at bedtime  insulin lispro (ADMELOG) corrective regimen sliding scale   SubCutaneous three times a day before meals  insulin lispro (ADMELOG) corrective regimen sliding scale   SubCutaneous at bedtime  insulin lispro Injectable (ADMELOG) 5 Unit(s) SubCutaneous three times a day before meals  latanoprost 0.005% Ophthalmic Solution 1 Drop(s) Both EYES at bedtime  levETIRAcetam  IVPB 500 milliGRAM(s) IV Intermittent every 12 hours  mirtazapine 15 milliGRAM(s) Oral at bedtime  pantoprazole  Injectable 40 milliGRAM(s) IV Push daily  senna 2 Tablet(s) Oral at bedtime  venlafaxine 37.5 milliGRAM(s) Oral every 12 hours    MEDICATIONS  (PRN):  acetaminophen   Tablet .. 650 milliGRAM(s) Oral every 6 hours PRN Temp greater or equal to 38C (100.4F), Mild Pain (1 - 3)  artificial  tears Solution 1 Drop(s) Both EYES four times a day PRN Dry Eyes      Vital Signs Last 24 Hrs  T(F): 98.2 (08-07-21 @ 12:02), Max: 98.2 (08-07-21 @ 04:54)  HR: 92 (08-07-21 @ 12:02) (92 - 95)  BP: 129/72 (08-07-21 @ 12:02) (106/62 - 136/73)  RR: 18 (08-07-21 @ 12:02) (18 - 18)  SpO2: 97% (08-07-21 @ 12:02) (97% - 100%)  Telemetry:   CAPILLARY BLOOD GLUCOSE      POCT Blood Glucose.: 323 mg/dL (07 Aug 2021 17:28)  POCT Blood Glucose.: 246 mg/dL (07 Aug 2021 12:35)  POCT Blood Glucose.: 111 mg/dL (07 Aug 2021 08:32)  POCT Blood Glucose.: 218 mg/dL (06 Aug 2021 21:36)    I&O's Summary    06 Aug 2021 07:01  -  07 Aug 2021 07:00  --------------------------------------------------------  IN: 420 mL / OUT: 700 mL / NET: -280 mL    07 Aug 2021 07:01  -  07 Aug 2021 19:12  --------------------------------------------------------  IN: 600 mL / OUT: 400 mL / NET: 200 mL        PHYSICAL EXAM:  GENERAL: NAD, well-developed  HEAD:  Atraumatic, Normocephalic  EYES: EOMI, PERRLA, conjunctiva and sclera clear  NECK: Supple, No JVD  CHEST/LUNG: Clear to auscultation bilaterally; No wheeze  HEART: Regular rate and rhythm; No murmurs, rubs, or gallops  ABDOMEN: Soft, Nontender, Nondistended; Bowel sounds present  EXTREMITIES:  2+ Peripheral Pulses, No clubbing, cyanosis, or edema  PSYCH: AAOx3  NEUROLOGY: non-focal  SKIN: No rashes or lesions    LABS:    08-06    143  |  112<H>  |  34<H>  ----------------------------<  297<H>  3.5   |  20<L>  |  1.52<H>    Ca    8.1<L>      06 Aug 2021 10:03                RADIOLOGY & ADDITIONAL TESTS:    Imaging Personally Reviewed:    Consultant(s) Notes Reviewed:      Care Discussed with Consultants/Other Providers:

## 2021-08-07 NOTE — PROGRESS NOTE ADULT - SUBJECTIVE AND OBJECTIVE BOX
Subjective: Patient seen and examined. No new events except as noted.   passed tov  urine dark red, no clots   REVIEW OF SYSTEMS:    CONSTITUTIONAL:+ weakness, fevers or chills  EYES/ENT: No visual changes;  No vertigo or throat pain   NECK: No pain or stiffness  RESPIRATORY: No cough, wheezing, hemoptysis; No shortness of breath  CARDIOVASCULAR: No chest pain or palpitations  GASTROINTESTINAL: No abdominal or epigastric pain. No nausea, vomiting, or hematemesis; No diarrhea or constipation. No melena or hematochezia.  GENITOURINARY: No dysuria, frequency or hematuria  NEUROLOGICAL: No numbness or weakness  SKIN: No itching, burning, rashes, or lesions   All other review of systems is negative unless indicated above.    MEDICATIONS:  MEDICATIONS  (STANDING):  brimonidine 0.2% Ophthalmic Solution 1 Drop(s) Both EYES two times a day  desmopressin 0.1 milliGRAM(s) Oral two times a day  dextrose 40% Gel 15 Gram(s) Oral once  dextrose 5%. 1000 milliLiter(s) (50 mL/Hr) IV Continuous <Continuous>  dextrose 5%. 1000 milliLiter(s) (100 mL/Hr) IV Continuous <Continuous>  dextrose 50% Injectable 25 Gram(s) IV Push once  dextrose 50% Injectable 12.5 Gram(s) IV Push once  dextrose 50% Injectable 25 Gram(s) IV Push once  folic acid 1 milliGRAM(s) Enteral Tube daily  glucagon  Injectable 1 milliGRAM(s) IntraMuscular once  insulin glargine Injectable (LANTUS) 7 Unit(s) SubCutaneous at bedtime  insulin lispro (ADMELOG) corrective regimen sliding scale   SubCutaneous three times a day before meals  insulin lispro (ADMELOG) corrective regimen sliding scale   SubCutaneous at bedtime  insulin lispro Injectable (ADMELOG) 5 Unit(s) SubCutaneous three times a day before meals  latanoprost 0.005% Ophthalmic Solution 1 Drop(s) Both EYES at bedtime  levETIRAcetam  IVPB 500 milliGRAM(s) IV Intermittent every 12 hours  mirtazapine 15 milliGRAM(s) Oral at bedtime  pantoprazole  Injectable 40 milliGRAM(s) IV Push daily  senna 2 Tablet(s) Oral at bedtime  venlafaxine 37.5 milliGRAM(s) Oral every 12 hours      PHYSICAL EXAM:  T(C): 36.8 (08-07-21 @ 20:21), Max: 36.8 (08-07-21 @ 04:54)  HR: 95 (08-07-21 @ 20:21) (92 - 95)  BP: 132/63 (08-07-21 @ 20:21) (129/72 - 136/73)  RR: 18 (08-07-21 @ 20:21) (18 - 18)  SpO2: 99% (08-07-21 @ 20:21) (97% - 100%)  Wt(kg): --  I&O's Summary    06 Aug 2021 07:01  -  07 Aug 2021 07:00  --------------------------------------------------------  IN: 420 mL / OUT: 700 mL / NET: -280 mL    07 Aug 2021 07:01  -  07 Aug 2021 22:35  --------------------------------------------------------  IN: 700 mL / OUT: 400 mL / NET: 300 mL          Appearance: NAD  HEENT:   Dry  oral mucosa	  Lymphatic: No lymphadenopathy , no edema  Cardiovascular: Normal S1 S2, No JVD, No murmurs , Peripheral pulses palpable 2+ bilaterally  Respiratory: Decreased bs   Gastrointestinal:  Soft, Non-tender, + BS	  Skin: No rashes, No ecchymoses, No cyanosis, warm to touch  Musculoskeletal: Decreased range of motion and  strength  Psychiatry:  sleepy lethargic   Ext: No edema            LABS:    CARDIAC MARKERS:            08-06    143  |  112<H>  |  34<H>  ----------------------------<  297<H>  3.5   |  20<L>  |  1.52<H>    Ca    8.1<L>      06 Aug 2021 10:03      proBNP:   Lipid Profile:   HgA1c:   TSH:             TELEMETRY: 	    ECG:  	  RADIOLOGY:   DIAGNOSTIC TESTING:  [ ] Echocardiogram:  [ ]  Catheterization:  [ ] Stress Test:    OTHER:

## 2021-08-07 NOTE — PROGRESS NOTE ADULT - ASSESSMENT
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 Recent admission to [facility] for the same, underwent EPO treatment and discharged. Reported Hgb of 5 during admission. Feeling generally weak, denies chest pain sob. Reports bright red blood in the toilet when she urinates. Denies dysuria, abdominal pain. Patient is unable to receive blood products of any kind.  has been tachycardic.

## 2021-08-07 NOTE — PROGRESS NOTE ADULT - SUBJECTIVE AND OBJECTIVE BOX
DAILY PROGRESS NOTE:         24 hr events:  passed tov  urine dark red, no clots     Objective:    Vital Signs Last 24 Hrs  T(C): 36.8 (07 Aug 2021 20:21), Max: 36.8 (07 Aug 2021 04:54)  T(F): 98.3 (07 Aug 2021 20:21), Max: 98.3 (07 Aug 2021 20:21)  HR: 95 (07 Aug 2021 20:21) (92 - 95)  BP: 132/63 (07 Aug 2021 20:21) (106/62 - 136/73)  BP(mean): --  RR: 18 (07 Aug 2021 20:21) (18 - 18)  SpO2: 99% (07 Aug 2021 20:21) (97% - 100%)    I&O's Detail    06 Aug 2021 07:01  -  07 Aug 2021 07:00  --------------------------------------------------------  IN:    IV PiggyBack: 100 mL    IV PiggyBack: 200 mL    Oral Fluid: 120 mL  Total IN: 420 mL    OUT:    Ureteral Catheter (mL): 700 mL  Total OUT: 700 mL    Total NET: -280 mL      07 Aug 2021 07:01  -  07 Aug 2021 20:59  --------------------------------------------------------  IN:    Oral Fluid: 600 mL  Total IN: 600 mL    OUT:    Ureteral Catheter (mL): 400 mL  Total OUT: 400 mL    Total NET: 200 mL          Physical Exam:    General: NAD, well-nourished  : dark red/tea color urine in canister from primafit. no clots   Psych: AOx3  Neuro: No focal deficits       Laboratory Results:      08-06    143  |  112<H>  |  34<H>  ----------------------------<  297<H>  3.5   |  20<L>  |  1.52<H>    Ca    8.1<L>      06 Aug 2021 10:03

## 2021-08-07 NOTE — PROGRESS NOTE ADULT - ASSESSMENT
States she feels fine  Denies complaints   on room air    acetaminophen   Tablet .. 650 milliGRAM(s) Oral every 6 hours PRN  artificial  tears Solution 1 Drop(s) Both EYES four times a day PRN  brimonidine 0.2% Ophthalmic Solution 1 Drop(s) Both EYES two times a day  desmopressin 0.1 milliGRAM(s) Oral two times a day  dextrose 40% Gel 15 Gram(s) Oral once  dextrose 5%. 1000 milliLiter(s) IV Continuous <Continuous>  dextrose 5%. 1000 milliLiter(s) IV Continuous <Continuous>  dextrose 50% Injectable 25 Gram(s) IV Push once  dextrose 50% Injectable 12.5 Gram(s) IV Push once  dextrose 50% Injectable 25 Gram(s) IV Push once  folic acid 1 milliGRAM(s) Enteral Tube daily  glucagon  Injectable 1 milliGRAM(s) IntraMuscular once  insulin glargine Injectable (LANTUS) 8 Unit(s) SubCutaneous at bedtime  insulin lispro (ADMELOG) corrective regimen sliding scale   SubCutaneous three times a day before meals  insulin lispro (ADMELOG) corrective regimen sliding scale   SubCutaneous at bedtime  insulin lispro Injectable (ADMELOG) 3 Unit(s) SubCutaneous three times a day before meals  latanoprost 0.005% Ophthalmic Solution 1 Drop(s) Both EYES at bedtime  levETIRAcetam  IVPB 500 milliGRAM(s) IV Intermittent every 12 hours  mirtazapine 15 milliGRAM(s) Oral at bedtime  pantoprazole  Injectable 40 milliGRAM(s) IV Push daily  piperacillin/tazobactam IVPB.. 3.375 Gram(s) IV Intermittent every 8 hours  senna 2 Tablet(s) Oral at bedtime  venlafaxine 37.5 milliGRAM(s) Oral every 12 hours      VITAL:  T(C): , Max: 36.8 (08-07-21 @ 04:54)  T(F): , Max: 98.2 (08-07-21 @ 04:54)  HR: 95 (08-07-21 @ 04:54)  BP: 136/73 (08-07-21 @ 04:54)  BP(mean): --  RR: 18 (08-07-21 @ 04:54)  SpO2: 100% (08-07-21 @ 04:54)  Wt(kg): --    08-06-21 @ 07:01  -  08-07-21 @ 07:00  --------------------------------------------------------  IN: 420 mL / OUT: 700 mL / NET: -280 mL    08-07-21 @ 07:01  -  08-07-21 @ 11:59  --------------------------------------------------------  IN: 300 mL / OUT: 0 mL / NET: 300 mL        PHYSICAL EXAM:  Constitutional: alert; (+)soft restraints  HEENT: DMM, no NGT  Neck: Supple, No JVD  Respiratory: CTA-b/l  Cardiovascular: tachy reg s1s2  Gastrointestinal: BS+, soft, NT/ND  : (+)diaz-clear red urine  Extremities: No peripheral edema b/l  Neurological: no focal deficits; strength grossly intact  Back: no CVAT b/l  Skin: No rashes, no nevi    LABS:      Na(143)/K(3.5)/Cl(112)/HCO3(20)/BUN(34)/Cr(1.52)Glu(297)/Ca(8.1)/Mg(--)/PO4(--)    08-06 @ 10:03  Na(159)/K(3.2)/Cl(123)/HCO3(23)/BUN(47)/Cr(1.73)Glu(308)/Ca(9.6)/Mg(--)/PO4(--)    08-05 @ 07:06              IMPRESSION: 70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    (3)Hypokalemia - borderline    (4)ID - enterococcal bacteremia - on IV Ampicillin    (5)Anemia - severe - in setting of being a Advent. Iron deficiency; not indicated for SAWYER. S/p several doses of IV Venofer      RECOMMEND:  (1)DDAVP as ordered  (2)Treatment of anemia per primary team  (3)Dose new meds for GFR 40-50ml/min (present dosing is acceptable)  (4)BMP as needed  (5)PT eunice Castillo, NP-BC  [facility]  [phone]

## 2021-08-07 NOTE — PROGRESS NOTE ADULT - PROBLEM SELECTOR PLAN 1
-test BG AC/HS  -Decrease Lantus 7 units QHS. Glucose dropped over 100mg/dl overnight  -Increase Admelog 5 units AC meals  -c/w Admelog low correction scale AC and Low HS scale  -cons carb diet  Discharge plan:  TBD based on GFR. Home regimen may need adjustment.

## 2021-08-08 LAB
GLUCOSE BLDC GLUCOMTR-MCNC: 106 MG/DL — HIGH (ref 70–99)
GLUCOSE BLDC GLUCOMTR-MCNC: 147 MG/DL — HIGH (ref 70–99)
GLUCOSE BLDC GLUCOMTR-MCNC: 216 MG/DL — HIGH (ref 70–99)
GLUCOSE BLDC GLUCOMTR-MCNC: 217 MG/DL — HIGH (ref 70–99)
GLUCOSE BLDC GLUCOMTR-MCNC: 254 MG/DL — HIGH (ref 70–99)

## 2021-08-08 PROCEDURE — 99232 SBSQ HOSP IP/OBS MODERATE 35: CPT

## 2021-08-08 RX ORDER — INSULIN GLARGINE 100 [IU]/ML
9 INJECTION, SOLUTION SUBCUTANEOUS AT BEDTIME
Refills: 0 | Status: DISCONTINUED | OUTPATIENT
Start: 2021-08-08 | End: 2021-08-09

## 2021-08-08 RX ORDER — INSULIN LISPRO 100/ML
7 VIAL (ML) SUBCUTANEOUS
Refills: 0 | Status: DISCONTINUED | OUTPATIENT
Start: 2021-08-08 | End: 2021-08-09

## 2021-08-08 RX ORDER — INSULIN LISPRO 100/ML
VIAL (ML) SUBCUTANEOUS
Refills: 0 | Status: DISCONTINUED | OUTPATIENT
Start: 2021-08-08 | End: 2021-08-13

## 2021-08-08 RX ADMIN — Medication 37.5 MILLIGRAM(S): at 17:05

## 2021-08-08 RX ADMIN — Medication 216 GRAM(S): at 12:11

## 2021-08-08 RX ADMIN — Medication 2: at 14:01

## 2021-08-08 RX ADMIN — Medication 216 GRAM(S): at 17:05

## 2021-08-08 RX ADMIN — LATANOPROST 1 DROP(S): 0.05 SOLUTION/ DROPS OPHTHALMIC; TOPICAL at 21:09

## 2021-08-08 RX ADMIN — SENNA PLUS 2 TABLET(S): 8.6 TABLET ORAL at 21:09

## 2021-08-08 RX ADMIN — Medication 7 UNIT(S): at 14:00

## 2021-08-08 RX ADMIN — MIRTAZAPINE 15 MILLIGRAM(S): 45 TABLET, ORALLY DISINTEGRATING ORAL at 21:09

## 2021-08-08 RX ADMIN — Medication 7 UNIT(S): at 18:24

## 2021-08-08 RX ADMIN — Medication 37.5 MILLIGRAM(S): at 06:24

## 2021-08-08 RX ADMIN — LEVETIRACETAM 400 MILLIGRAM(S): 250 TABLET, FILM COATED ORAL at 10:17

## 2021-08-08 RX ADMIN — Medication 4: at 09:12

## 2021-08-08 RX ADMIN — Medication 216 GRAM(S): at 00:34

## 2021-08-08 RX ADMIN — LEVETIRACETAM 400 MILLIGRAM(S): 250 TABLET, FILM COATED ORAL at 21:13

## 2021-08-08 RX ADMIN — BRIMONIDINE TARTRATE 1 DROP(S): 2 SOLUTION/ DROPS OPHTHALMIC at 06:23

## 2021-08-08 RX ADMIN — Medication 1 MILLIGRAM(S): at 13:53

## 2021-08-08 RX ADMIN — DESMOPRESSIN ACETATE 0.1 MILLIGRAM(S): 0.1 TABLET ORAL at 17:06

## 2021-08-08 RX ADMIN — PANTOPRAZOLE SODIUM 40 MILLIGRAM(S): 20 TABLET, DELAYED RELEASE ORAL at 13:53

## 2021-08-08 RX ADMIN — BRIMONIDINE TARTRATE 1 DROP(S): 2 SOLUTION/ DROPS OPHTHALMIC at 17:05

## 2021-08-08 RX ADMIN — INSULIN GLARGINE 9 UNIT(S): 100 INJECTION, SOLUTION SUBCUTANEOUS at 22:04

## 2021-08-08 RX ADMIN — Medication 5 UNIT(S): at 09:13

## 2021-08-08 RX ADMIN — Medication 216 GRAM(S): at 06:23

## 2021-08-08 RX ADMIN — DESMOPRESSIN ACETATE 0.1 MILLIGRAM(S): 0.1 TABLET ORAL at 06:24

## 2021-08-08 NOTE — PROGRESS NOTE ADULT - SUBJECTIVE AND OBJECTIVE BOX
Diabetes Follow up note:    Chief complaint: T2DM    Interval Hx:     Review of Systems:  General:  GI: Tolerating POs. Denies N/V/D/Abd pain  CV: Denies CP/SOB  ENDO: No S&Sx of hypoglycemia  MEDS:  desmopressin 0.1 milliGRAM(s) Oral two times a day    insulin glargine Injectable (LANTUS) 7 Unit(s) SubCutaneous at bedtime  insulin lispro (ADMELOG) corrective regimen sliding scale   SubCutaneous three times a day before meals  insulin lispro (ADMELOG) corrective regimen sliding scale   SubCutaneous at bedtime  insulin lispro Injectable (ADMELOG) 5 Unit(s) SubCutaneous three times a day before meals    ampicillin  IVPB 2 Gram(s) IV Intermittent every 6 hours    Allergies    No Known Allergies        PE:  General:  Vital Signs Last 24 Hrs  T(C): 36.7 (08 Aug 2021 05:04), Max: 36.8 (07 Aug 2021 12:02)  T(F): 98 (08 Aug 2021 05:04), Max: 98.3 (07 Aug 2021 20:21)  HR: 98 (08 Aug 2021 05:04) (92 - 98)  BP: 145/72 (08 Aug 2021 05:04) (129/72 - 145/72)  BP(mean): --  RR: 18 (08 Aug 2021 05:04) (18 - 18)  SpO2: 98% (08 Aug 2021 05:04) (97% - 99%)  Abd: Soft, NT,ND,   Extremities: Warm  Neuro: A&O X3    LABS:  POCT Blood Glucose.: 254 mg/dL (08-08-21 @ 08:44)  POCT Blood Glucose.: 252 mg/dL (08-07-21 @ 21:36)  POCT Blood Glucose.: 323 mg/dL (08-07-21 @ 17:28)  POCT Blood Glucose.: 246 mg/dL (08-07-21 @ 12:35)  POCT Blood Glucose.: 111 mg/dL (08-07-21 @ 08:32)  POCT Blood Glucose.: 218 mg/dL (08-06-21 @ 21:36)  POCT Blood Glucose.: 198 mg/dL (08-06-21 @ 17:59)  POCT Blood Glucose.: 280 mg/dL (08-06-21 @ 13:05)  POCT Blood Glucose.: 322 mg/dL (08-06-21 @ 09:16)  POCT Blood Glucose.: 263 mg/dL (08-05-21 @ 21:07)  POCT Blood Glucose.: 313 mg/dL (08-05-21 @ 17:17)  POCT Blood Glucose.: 346 mg/dL (08-05-21 @ 12:59)                  Thyroid Function Tests:  07-29 @ 19:56 TSH 2.45 FreeT4 -- T3 -- Anti TPO -- Anti Thyroglobulin Ab -- TSI --      A1C with Estimated Average Glucose Result: 6.5 % (07-28-21 @ 09:21)          Contact number: martin [phone] or [phone]       Diabetes Follow up note:    Chief complaint: T2DM    Interval Hx: BG values above goal on present insulin regimen. Pt seen at bedside. Reports feeling better overall and tolerating POs. Understands she will need rehab at discharge. Pt says she was taking Herve-met and Lantus 30 units nightly at home.     Review of Systems:  General: denies pain  GI: Tolerating POs. Denies N/V/D/Abd pain  CV: Denies CP/SOB  ENDO: No S&Sx of hypoglycemia  MEDS:  desmopressin 0.1 milliGRAM(s) Oral two times a day    insulin glargine Injectable (LANTUS) 7 Unit(s) SubCutaneous at bedtime  insulin lispro (ADMELOG) corrective regimen sliding scale   SubCutaneous three times a day before meals  insulin lispro (ADMELOG) corrective regimen sliding scale   SubCutaneous at bedtime  insulin lispro Injectable (ADMELOG) 5 Unit(s) SubCutaneous three times a day before meals    ampicillin  IVPB 2 Gram(s) IV Intermittent every 6 hours    Allergies    No Known Allergies        PE:  General: Female lying in bed. NAD.   Vital Signs Last 24 Hrs  T(C): 36.7 (08 Aug 2021 05:04), Max: 36.8 (07 Aug 2021 12:02)  T(F): 98 (08 Aug 2021 05:04), Max: 98.3 (07 Aug 2021 20:21)  HR: 98 (08 Aug 2021 05:04) (92 - 98)  BP: 145/72 (08 Aug 2021 05:04) (129/72 - 145/72)  BP(mean): --  RR: 18 (08 Aug 2021 05:04) (18 - 18)  SpO2: 98% (08 Aug 2021 05:04) (97% - 99%)  Abd: Soft, NT,ND,   Extremities: Warm. no edema x 4 ext.   Neuro: A&O X3    LABS:  POCT Blood Glucose.: 254 mg/dL (08-08-21 @ 08:44)  POCT Blood Glucose.: 252 mg/dL (08-07-21 @ 21:36)  POCT Blood Glucose.: 323 mg/dL (08-07-21 @ 17:28)  POCT Blood Glucose.: 246 mg/dL (08-07-21 @ 12:35)  POCT Blood Glucose.: 111 mg/dL (08-07-21 @ 08:32)  POCT Blood Glucose.: 218 mg/dL (08-06-21 @ 21:36)  POCT Blood Glucose.: 198 mg/dL (08-06-21 @ 17:59)  POCT Blood Glucose.: 280 mg/dL (08-06-21 @ 13:05)  POCT Blood Glucose.: 322 mg/dL (08-06-21 @ 09:16)  POCT Blood Glucose.: 263 mg/dL (08-05-21 @ 21:07)  POCT Blood Glucose.: 313 mg/dL (08-05-21 @ 17:17)  POCT Blood Glucose.: 346 mg/dL (08-05-21 @ 12:59)                  Thyroid Function Tests:  07-29 @ 19:56 TSH 2.45 FreeT4 -- T3 -- Anti TPO -- Anti Thyroglobulin Ab -- TSI --      A1C with Estimated Average Glucose Result: 6.5 % (07-28-21 @ 09:21)          Contact number: martin [phone] or [phone]

## 2021-08-08 NOTE — PROGRESS NOTE ADULT - ASSESSMENT
71 yo Female Jehovah witness with PMHx of breast CA s/p BL mastectomy with recurrence and metastasis here with c/o vaginal bleeding, found to have bladder hematoma on pelvic ultrasound. Pt also with R sided nephU with mild hydro, but pt unsure indication for nephU.   on admission: H/H 4.3/13.5, SCr 1.37.  Pt follows up at [facility] and is currently receiving chemotherapy.     7/28: Hct 11.2 from 13.5 yesterday. ptn is pale, lethargic, dyspneic, expresses wishes not to have any blood products based on her GD DOV. ptn placed her proxy on the phone Mr. Gray who wanted to confirm the hospital is respecting ptn's wishes. both ptn and the proxy aksed baout being transferred to [facility]. however ptn is not stable w HGB of 3 to be travelling. denies CP, palps, HA, has ongoing hematuria, CBI in place. Ct C/A/P reviewed. ptn w metastatic dz process on CT scan. Heme consult called. will cont CBI as per . pending CT urogram. cont trending HH. started on IV IRON. prognosis is guarded.   7/29: ptn found unresponsive with a droop, fever, ongoing hematuria, had filled out a MOLST form , she is DNR/DNI, code stroke called, will give Abx, keep temp down w cooling blanket, since ptn cannot receive anticoagulation/is dnr/dni will not get CTA of brain, will place on KEPPRA for sz, get eeg, prognosis is grave. this was d/w ,Mr Gray ptn's proxy. MICU consult reviewed. prognosis is grave  7/30:   ptn is morwe responsive today though minimally, has enterococcal bacteremia. seen by ID , placed on IV AMPICILLIN while awaiting sensitivities. rpt blood cx sent. will check HH in am. all blood draws should be done in pediatric [facility] . GLEN, ongoing 2/2 ischemic ATN, hematuria improving  7/31:  little more responsive today, has hypernatremia, will start free water via NGT. has clots in CBI. rpt hgb3.6, ptn is hyperglycemic. will call endo  8/1: ptn is lethargic, hematuria has improved, now off  CBI, ongoing GLEN and hypernatremia though improved, heme, renal  following. Hyperglycemia, will dc d5W drip, place on standing LANTUS , cont Admelog on a sliding scale, Dr. Guthrie from [facility] called. increase free water intake to 250 q4H per NGT. rpt cbc and BMP in am via pediatric tubes. tranexamic acid, EPO, IV iron, folic acid and B12 as per heme  8/2: awake, alert, responds appropriately. on and off confusion, diaz in place w hematuria, no clots. off CBI.  ptn refused Blood work today. on NGT feeds w free water per NGT, hyperglycemia is ongoing, not controlled, endo on board. will DC IVF  8/3: ptn more awake and alert. on and off confusion. HH improving. heme following, diaz w pink urine. hypernatremia worsening despite FREE water via NGT  8/4: pulled NGT. will need to replace. ptn had filled out a MOLST form prior to arrival. no artifical feeds. remain NPO 2/2 failed swallow study. check HH and Na tomorrow. on IVF. getting procrit today. has pink urine in diaz  8/5: passed S&S, placed on a soft diet, has DI as per renal, placed on DDAVP, hence persistent hypernatremia. on IV ABx for bacteremia, rpt blood cx neg. Diaz in place, pink clear urine. HH is still quite low, on IV iron and procrit.   8/6: dc diaz, TOV as per urology. ABx as per ID. ptn wants to go home. check HH tomorrow. will need home w hospice. once ptn is on hospice i dont think PT is cont, will need to check.  will inquire about SRINI  8/7, 8/8: ptn states she feels fine. wants to go home. GLEN is improving. on DDAVP for DI post CVA. chronic hematuria. passed TOV. glucose still above goal but improving. hospice consult palced

## 2021-08-08 NOTE — PROGRESS NOTE ADULT - ASSESSMENT
69 yo F [employer], hx of breast Ca on active chemo (unsure of name) and anemia p/w reported vaginal bleeding. Recent admission to [facility] for the same, underwent EPO treatment and discharged. Reported Hgb of 5 during admission. Feeling generally weak, denies chest pain sob. Reports bright red blood in the toilet when she urinates. Denies dysuria, abdominal pain. Patient is unable to receive blood products of any kind.  has been tachycardic.

## 2021-08-08 NOTE — PROGRESS NOTE ADULT - SUBJECTIVE AND OBJECTIVE BOX
Patient is a 70y old  Female who presents with a chief complaint of severe anemia, acute blood loss 2/2 hematuria (08 Aug 2021 11:09)      SUBJECTIVE / OVERNIGHT EVENTS: ptn is awake alert, has dark red urine, no clots. as per her outptn urologist that's chronic. wants to go home. tolerating a diet. awaiting hospice consult     MEDICATIONS  (STANDING):  ampicillin  IVPB 2 Gram(s) IV Intermittent every 6 hours  brimonidine 0.2% Ophthalmic Solution 1 Drop(s) Both EYES two times a day  desmopressin 0.1 milliGRAM(s) Oral two times a day  dextrose 40% Gel 15 Gram(s) Oral once  dextrose 5%. 1000 milliLiter(s) (50 mL/Hr) IV Continuous <Continuous>  dextrose 5%. 1000 milliLiter(s) (100 mL/Hr) IV Continuous <Continuous>  dextrose 50% Injectable 25 Gram(s) IV Push once  dextrose 50% Injectable 12.5 Gram(s) IV Push once  dextrose 50% Injectable 25 Gram(s) IV Push once  folic acid 1 milliGRAM(s) Enteral Tube daily  glucagon  Injectable 1 milliGRAM(s) IntraMuscular once  insulin glargine Injectable (LANTUS) 9 Unit(s) SubCutaneous at bedtime  insulin lispro (ADMELOG) corrective regimen sliding scale   SubCutaneous three times a day before meals  insulin lispro (ADMELOG) corrective regimen sliding scale   SubCutaneous at bedtime  insulin lispro Injectable (ADMELOG) 7 Unit(s) SubCutaneous three times a day before meals  latanoprost 0.005% Ophthalmic Solution 1 Drop(s) Both EYES at bedtime  levETIRAcetam  IVPB 500 milliGRAM(s) IV Intermittent every 12 hours  mirtazapine 15 milliGRAM(s) Oral at bedtime  pantoprazole  Injectable 40 milliGRAM(s) IV Push daily  senna 2 Tablet(s) Oral at bedtime  venlafaxine 37.5 milliGRAM(s) Oral every 12 hours    MEDICATIONS  (PRN):  acetaminophen   Tablet .. 650 milliGRAM(s) Oral every 6 hours PRN Temp greater or equal to 38C (100.4F), Mild Pain (1 - 3)  artificial  tears Solution 1 Drop(s) Both EYES four times a day PRN Dry Eyes      Vital Signs Last 24 Hrs  T(F): 98 (08-08-21 @ 05:04), Max: 98.3 (08-07-21 @ 20:21)  HR: 98 (08-08-21 @ 05:04) (95 - 98)  BP: 145/72 (08-08-21 @ 05:04) (132/63 - 145/72)  RR: 18 (08-08-21 @ 05:04) (18 - 18)  SpO2: 98% (08-08-21 @ 05:04) (98% - 99%)  Telemetry:   CAPILLARY BLOOD GLUCOSE      POCT Blood Glucose.: 217 mg/dL (08 Aug 2021 13:57)  POCT Blood Glucose.: 216 mg/dL (08 Aug 2021 12:32)  POCT Blood Glucose.: 254 mg/dL (08 Aug 2021 08:44)  POCT Blood Glucose.: 252 mg/dL (07 Aug 2021 21:36)    I&O's Summary    07 Aug 2021 07:01  -  08 Aug 2021 07:00  --------------------------------------------------------  IN: 1020 mL / OUT: 1500 mL / NET: -480 mL    08 Aug 2021 07:01  -  08 Aug 2021 17:41  --------------------------------------------------------  IN: 360 mL / OUT: 600 mL / NET: -240 mL        PHYSICAL EXAM:  GENERAL: NAD, well-developed  HEAD:  Atraumatic, Normocephalic  EYES: EOMI, PERRLA, conjunctiva and sclera clear  NECK: Supple, No JVD  CHEST/LUNG: Clear to auscultation bilaterally; No wheeze  HEART: Regular rate and rhythm; No murmurs, rubs, or gallops  ABDOMEN: Soft, Nontender, Nondistended; Bowel sounds present  EXTREMITIES:  2+ Peripheral Pulses, No clubbing, cyanosis, or edema  PSYCH: AAOx3  NEUROLOGY: non-focal  SKIN: No rashes or lesions    LABS:                    RADIOLOGY & ADDITIONAL TESTS:    Imaging Personally Reviewed:    Consultant(s) Notes Reviewed:      Care Discussed with Consultants/Other Providers:

## 2021-08-08 NOTE — PROGRESS NOTE ADULT - SUBJECTIVE AND OBJECTIVE BOX
Subjective: Patient seen and examined. No new events except as noted.     REVIEW OF SYSTEMS:  Unable to obtain        MEDICATIONS:  MEDICATIONS  (STANDING):  ampicillin  IVPB 2 Gram(s) IV Intermittent every 6 hours  brimonidine 0.2% Ophthalmic Solution 1 Drop(s) Both EYES two times a day  desmopressin 0.1 milliGRAM(s) Oral two times a day  dextrose 40% Gel 15 Gram(s) Oral once  dextrose 5%. 1000 milliLiter(s) (50 mL/Hr) IV Continuous <Continuous>  dextrose 5%. 1000 milliLiter(s) (100 mL/Hr) IV Continuous <Continuous>  dextrose 50% Injectable 25 Gram(s) IV Push once  dextrose 50% Injectable 12.5 Gram(s) IV Push once  dextrose 50% Injectable 25 Gram(s) IV Push once  folic acid 1 milliGRAM(s) Enteral Tube daily  glucagon  Injectable 1 milliGRAM(s) IntraMuscular once  insulin glargine Injectable (LANTUS) 7 Unit(s) SubCutaneous at bedtime  insulin lispro (ADMELOG) corrective regimen sliding scale   SubCutaneous three times a day before meals  insulin lispro (ADMELOG) corrective regimen sliding scale   SubCutaneous at bedtime  insulin lispro Injectable (ADMELOG) 5 Unit(s) SubCutaneous three times a day before meals  latanoprost 0.005% Ophthalmic Solution 1 Drop(s) Both EYES at bedtime  levETIRAcetam  IVPB 500 milliGRAM(s) IV Intermittent every 12 hours  mirtazapine 15 milliGRAM(s) Oral at bedtime  pantoprazole  Injectable 40 milliGRAM(s) IV Push daily  senna 2 Tablet(s) Oral at bedtime  venlafaxine 37.5 milliGRAM(s) Oral every 12 hours      PHYSICAL EXAM:  T(C): 36.7 (08-08-21 @ 05:04), Max: 36.8 (08-07-21 @ 12:02)  HR: 98 (08-08-21 @ 05:04) (92 - 98)  BP: 145/72 (08-08-21 @ 05:04) (129/72 - 145/72)  RR: 18 (08-08-21 @ 05:04) (18 - 18)  SpO2: 98% (08-08-21 @ 05:04) (97% - 99%)  Wt(kg): --  I&O's Summary    07 Aug 2021 07:01  -  08 Aug 2021 07:00  --------------------------------------------------------  IN: 1020 mL / OUT: 1500 mL / NET: -480 mL          Appearance: NAD  HEENT:   Dry  oral mucosa	  Lymphatic: No lymphadenopathy , no edema  Cardiovascular: Normal S1 S2, No JVD, No murmurs , Peripheral pulses palpable 2+ bilaterally  Respiratory: Decreased bs   Gastrointestinal:  Soft, Non-tender, + BS	  Skin: No rashes, No ecchymoses, No cyanosis, warm to touch  Musculoskeletal: Decreased range of motion and  strength  Psychiatry:  sleepy lethargic   Ext: No edema    LABS:    CARDIAC MARKERS:            08-06    143  |  112<H>  |  34<H>  ----------------------------<  297<H>  3.5   |  20<L>  |  1.52<H>    Ca    8.1<L>      06 Aug 2021 10:03      proBNP:   Lipid Profile:   HgA1c:   TSH:             TELEMETRY: 	    ECG:  	  RADIOLOGY:   DIAGNOSTIC TESTING:  [ ] Echocardiogram:  [ ]  Catheterization:  [ ] Stress Test:    OTHER:

## 2021-08-08 NOTE — PROGRESS NOTE ADULT - PROBLEM SELECTOR PLAN 1
-test BG AC/HS  -Increase Lantus 9 units QHS.  -Increase Admelog 7 units AC meals  -c/w Admelog low correction scale AC and Low HS scale  -cons carb diet  Discharge plan:  TBD based on GFR. Home regimen may need adjustment.

## 2021-08-08 NOTE — PROGRESS NOTE ADULT - ASSESSMENT
71 y/o F, Hinduism, w/h/o T2DM> HbA1c 6.5% (Skewed due to severe anemia) . DM c/b neuropath, CKD. Also h/o breast Ca on chemo (unsure of name) anemia, HTN, HLD/ Here with severe anemia, acute blood loss 2/2 hematuria> unable ot receive blood. Consult called for management of hyperglycemia. Tolerating POs w/BG values above goal. BG goal (100-180mg/dl).

## 2021-08-09 LAB
ANION GAP SERPL CALC-SCNC: 14 MMOL/L — SIGNIFICANT CHANGE UP (ref 5–17)
APPEARANCE UR: ABNORMAL
BACTERIA # UR AUTO: NEGATIVE — SIGNIFICANT CHANGE UP
BILIRUB UR-MCNC: NEGATIVE — SIGNIFICANT CHANGE UP
BUN SERPL-MCNC: 24 MG/DL — HIGH (ref 7–23)
CALCIUM SERPL-MCNC: 8.2 MG/DL — LOW (ref 8.4–10.5)
CHLORIDE SERPL-SCNC: 109 MMOL/L — HIGH (ref 96–108)
CO2 SERPL-SCNC: 17 MMOL/L — LOW (ref 22–31)
COLOR SPEC: SIGNIFICANT CHANGE UP
CREAT SERPL-MCNC: 1.75 MG/DL — HIGH (ref 0.5–1.3)
CULTURE RESULTS: SIGNIFICANT CHANGE UP
CULTURE RESULTS: SIGNIFICANT CHANGE UP
DIFF PNL FLD: ABNORMAL
EPI CELLS # UR: 2 /HPF — SIGNIFICANT CHANGE UP
GLUCOSE BLDC GLUCOMTR-MCNC: 113 MG/DL — HIGH (ref 70–99)
GLUCOSE BLDC GLUCOMTR-MCNC: 163 MG/DL — HIGH (ref 70–99)
GLUCOSE BLDC GLUCOMTR-MCNC: 258 MG/DL — HIGH (ref 70–99)
GLUCOSE BLDC GLUCOMTR-MCNC: 265 MG/DL — HIGH (ref 70–99)
GLUCOSE SERPL-MCNC: 262 MG/DL — HIGH (ref 70–99)
GLUCOSE UR QL: ABNORMAL
HCT VFR BLD CALC: 13.2 % — CRITICAL LOW (ref 34.5–45)
HGB BLD-MCNC: 3.8 G/DL — CRITICAL LOW (ref 11.5–15.5)
HYALINE CASTS # UR AUTO: 14 /LPF — HIGH (ref 0–2)
KETONES UR-MCNC: NEGATIVE — SIGNIFICANT CHANGE UP
LEUKOCYTE ESTERASE UR-ACNC: ABNORMAL
MAGNESIUM SERPL-MCNC: 2.1 MG/DL — SIGNIFICANT CHANGE UP (ref 1.6–2.6)
MCHC RBC-ENTMCNC: 28.8 GM/DL — LOW (ref 32–36)
MCHC RBC-ENTMCNC: 36.5 PG — HIGH (ref 27–34)
MCV RBC AUTO: 126.9 FL — HIGH (ref 80–100)
NITRITE UR-MCNC: NEGATIVE — SIGNIFICANT CHANGE UP
NRBC # BLD: 0 /100 WBCS — SIGNIFICANT CHANGE UP (ref 0–0)
PH UR: 6.5 — SIGNIFICANT CHANGE UP (ref 5–8)
PLATELET # BLD AUTO: 113 K/UL — LOW (ref 150–400)
POTASSIUM SERPL-MCNC: 4.3 MMOL/L — SIGNIFICANT CHANGE UP (ref 3.5–5.3)
POTASSIUM SERPL-SCNC: 4.3 MMOL/L — SIGNIFICANT CHANGE UP (ref 3.5–5.3)
PROT UR-MCNC: ABNORMAL
RBC # BLD: 1.04 M/UL — LOW (ref 3.8–5.2)
RBC # FLD: 25.5 % — HIGH (ref 10.3–14.5)
RBC CASTS # UR COMP ASSIST: 310 /HPF — HIGH (ref 0–4)
SODIUM SERPL-SCNC: 140 MMOL/L — SIGNIFICANT CHANGE UP (ref 135–145)
SP GR SPEC: 1.01 — SIGNIFICANT CHANGE UP (ref 1.01–1.02)
SPECIMEN SOURCE: SIGNIFICANT CHANGE UP
SPECIMEN SOURCE: SIGNIFICANT CHANGE UP
UROBILINOGEN FLD QL: NEGATIVE — SIGNIFICANT CHANGE UP
WBC # BLD: 4.38 K/UL — SIGNIFICANT CHANGE UP (ref 3.8–10.5)
WBC # FLD AUTO: 4.38 K/UL — SIGNIFICANT CHANGE UP (ref 3.8–10.5)
WBC UR QL: 41 /HPF — HIGH (ref 0–5)

## 2021-08-09 PROCEDURE — 99232 SBSQ HOSP IP/OBS MODERATE 35: CPT

## 2021-08-09 RX ORDER — IRON SUCROSE 20 MG/ML
200 INJECTION, SOLUTION INTRAVENOUS EVERY 24 HOURS
Refills: 0 | Status: DISCONTINUED | OUTPATIENT
Start: 2021-08-09 | End: 2021-08-09

## 2021-08-09 RX ORDER — INSULIN LISPRO 100/ML
9 VIAL (ML) SUBCUTANEOUS
Refills: 0 | Status: DISCONTINUED | OUTPATIENT
Start: 2021-08-09 | End: 2021-08-10

## 2021-08-09 RX ORDER — IRON SUCROSE 20 MG/ML
200 INJECTION, SOLUTION INTRAVENOUS EVERY 24 HOURS
Refills: 0 | Status: COMPLETED | OUTPATIENT
Start: 2021-08-09 | End: 2021-08-10

## 2021-08-09 RX ORDER — INSULIN GLARGINE 100 [IU]/ML
11 INJECTION, SOLUTION SUBCUTANEOUS AT BEDTIME
Refills: 0 | Status: DISCONTINUED | OUTPATIENT
Start: 2021-08-09 | End: 2021-08-10

## 2021-08-09 RX ADMIN — Medication 1: at 18:26

## 2021-08-09 RX ADMIN — INSULIN GLARGINE 11 UNIT(S): 100 INJECTION, SOLUTION SUBCUTANEOUS at 21:58

## 2021-08-09 RX ADMIN — BRIMONIDINE TARTRATE 1 DROP(S): 2 SOLUTION/ DROPS OPHTHALMIC at 17:30

## 2021-08-09 RX ADMIN — DESMOPRESSIN ACETATE 0.1 MILLIGRAM(S): 0.1 TABLET ORAL at 05:41

## 2021-08-09 RX ADMIN — LEVETIRACETAM 400 MILLIGRAM(S): 250 TABLET, FILM COATED ORAL at 12:27

## 2021-08-09 RX ADMIN — Medication 37.5 MILLIGRAM(S): at 17:30

## 2021-08-09 RX ADMIN — Medication 3: at 09:00

## 2021-08-09 RX ADMIN — PANTOPRAZOLE SODIUM 40 MILLIGRAM(S): 20 TABLET, DELAYED RELEASE ORAL at 12:37

## 2021-08-09 RX ADMIN — IRON SUCROSE 110 MILLIGRAM(S): 20 INJECTION, SOLUTION INTRAVENOUS at 18:25

## 2021-08-09 RX ADMIN — Medication 37.5 MILLIGRAM(S): at 05:41

## 2021-08-09 RX ADMIN — Medication 3: at 12:58

## 2021-08-09 RX ADMIN — BRIMONIDINE TARTRATE 1 DROP(S): 2 SOLUTION/ DROPS OPHTHALMIC at 05:41

## 2021-08-09 RX ADMIN — Medication 650 MILLIGRAM(S): at 16:12

## 2021-08-09 RX ADMIN — LATANOPROST 1 DROP(S): 0.05 SOLUTION/ DROPS OPHTHALMIC; TOPICAL at 21:04

## 2021-08-09 RX ADMIN — Medication 650 MILLIGRAM(S): at 16:42

## 2021-08-09 RX ADMIN — Medication 216 GRAM(S): at 01:00

## 2021-08-09 RX ADMIN — LEVETIRACETAM 400 MILLIGRAM(S): 250 TABLET, FILM COATED ORAL at 21:04

## 2021-08-09 RX ADMIN — Medication 216 GRAM(S): at 17:33

## 2021-08-09 RX ADMIN — Medication 216 GRAM(S): at 12:27

## 2021-08-09 RX ADMIN — MIRTAZAPINE 15 MILLIGRAM(S): 45 TABLET, ORALLY DISINTEGRATING ORAL at 21:04

## 2021-08-09 RX ADMIN — Medication 1 MILLIGRAM(S): at 12:36

## 2021-08-09 RX ADMIN — DESMOPRESSIN ACETATE 0.1 MILLIGRAM(S): 0.1 TABLET ORAL at 17:31

## 2021-08-09 RX ADMIN — Medication 7 UNIT(S): at 12:57

## 2021-08-09 RX ADMIN — Medication 216 GRAM(S): at 05:41

## 2021-08-09 RX ADMIN — Medication 7 UNIT(S): at 08:59

## 2021-08-09 RX ADMIN — SENNA PLUS 2 TABLET(S): 8.6 TABLET ORAL at 21:04

## 2021-08-09 RX ADMIN — Medication 216 GRAM(S): at 23:44

## 2021-08-09 RX ADMIN — Medication 9 UNIT(S): at 18:27

## 2021-08-09 NOTE — PROGRESS NOTE ADULT - ASSESSMENT
69 yo Female Jehovah witness with PMHx of breast CA s/p BL mastectomy with recurrence and metastasis here with c/o vaginal bleeding, found to have bladder hematoma on pelvic ultrasound. Pt also with R sided nephU with mild hydro, but pt unsure indication for nephU. H/H 4.3/13.5, SCr 1.37. Pt follows up at [facility] and is currently receiving chemotherapy.     - Dysuria likely secondary to recent diaz removal, if fails to improve can work up for UTI  - Monitor urine color and output, improve hydration, bowel regimen  - Contacted primary urologist, Dr. Klein ([facility]) - R nephro-U for R distal obstruction (met?), hematuria and anemia is chronic --> no further work up or investigations necessary at this time  - No further urologic investigations of interventions warranted at this time, please do not hesitate to contact urology should any additional urologic concerns or questions arise  - Pt should f/u with outpatient urology (Dr. Klein) at discharge

## 2021-08-09 NOTE — PROGRESS NOTE ADULT - SUBJECTIVE AND OBJECTIVE BOX
Subjective: Patient seen and examined. No new events except as noted.   hypertensive this am     REVIEW OF SYSTEMS:    CONSTITUTIONAL: + weakness, fevers or chills  EYES/ENT: No visual changes;  No vertigo or throat pain   NECK: No pain or stiffness  RESPIRATORY: No cough, wheezing, hemoptysis; No shortness of breath  CARDIOVASCULAR: No chest pain or palpitations  GASTROINTESTINAL: No abdominal or epigastric pain. No nausea, vomiting, or hematemesis; No diarrhea or constipation. No melena or hematochezia.  GENITOURINARY: No dysuria, frequency or hematuria  NEUROLOGICAL: No numbness or weakness  SKIN: No itching, burning, rashes, or lesions   All other review of systems is negative unless indicated above.    MEDICATIONS:  MEDICATIONS  (STANDING):  ampicillin  IVPB 2 Gram(s) IV Intermittent every 6 hours  brimonidine 0.2% Ophthalmic Solution 1 Drop(s) Both EYES two times a day  desmopressin 0.1 milliGRAM(s) Oral two times a day  dextrose 40% Gel 15 Gram(s) Oral once  dextrose 5%. 1000 milliLiter(s) (50 mL/Hr) IV Continuous <Continuous>  dextrose 5%. 1000 milliLiter(s) (100 mL/Hr) IV Continuous <Continuous>  dextrose 50% Injectable 25 Gram(s) IV Push once  dextrose 50% Injectable 12.5 Gram(s) IV Push once  dextrose 50% Injectable 25 Gram(s) IV Push once  folic acid 1 milliGRAM(s) Enteral Tube daily  glucagon  Injectable 1 milliGRAM(s) IntraMuscular once  insulin glargine Injectable (LANTUS) 9 Unit(s) SubCutaneous at bedtime  insulin lispro (ADMELOG) corrective regimen sliding scale   SubCutaneous three times a day before meals  insulin lispro (ADMELOG) corrective regimen sliding scale   SubCutaneous at bedtime  insulin lispro Injectable (ADMELOG) 7 Unit(s) SubCutaneous three times a day before meals  latanoprost 0.005% Ophthalmic Solution 1 Drop(s) Both EYES at bedtime  levETIRAcetam  IVPB 500 milliGRAM(s) IV Intermittent every 12 hours  mirtazapine 15 milliGRAM(s) Oral at bedtime  pantoprazole  Injectable 40 milliGRAM(s) IV Push daily  senna 2 Tablet(s) Oral at bedtime  venlafaxine 37.5 milliGRAM(s) Oral every 12 hours      PHYSICAL EXAM:  T(C): 36.7 (08-09-21 @ 05:24), Max: 36.7 (08-08-21 @ 20:01)  HR: 98 (08-09-21 @ 05:24) (91 - 98)  BP: 170/70 (08-09-21 @ 05:24) (125/68 - 170/70)  RR: 18 (08-09-21 @ 05:24) (18 - 18)  SpO2: 99% (08-09-21 @ 05:24) (99% - 99%)  Wt(kg): --  I&O's Summary    08 Aug 2021 07:01  -  09 Aug 2021 07:00  --------------------------------------------------------  IN: 1060 mL / OUT: 1000 mL / NET: 60 mL    09 Aug 2021 07:01  -  09 Aug 2021 10:47  --------------------------------------------------------  IN: 240 mL / OUT: 0 mL / NET: 240 mL          Appearance: NAD  HEENT:   Dry  oral mucosa	  Lymphatic: No lymphadenopathy , no edema  Cardiovascular: Normal S1 S2, No JVD, + murmurs , Peripheral pulses palpable 2+ bilaterally  Respiratory: Decreased bs   Gastrointestinal:  Soft, Non-tender, + BS	  Skin: No rashes, No ecchymoses, No cyanosis, warm to touch  Musculoskeletal: Decreased range of motion and  strength  Psychiatry:  sleepy lethargic   Ext: No edema      LABS:    CARDIAC MARKERS:                  proBNP:   Lipid Profile:   HgA1c:   TSH:         TELEMETRY: 	    ECG:  	  RADIOLOGY:   DIAGNOSTIC TESTING:  [ ] Echocardiogram:  < from: Transthoracic Echocardiogram (08.03.21 @ 07:51) >    Patient name: SIMA WILL  YOB: 1950   Age: 70 (F)   MR#: [id number]  Study Date: 8/3/2021  Location: [address] [place], [place] [id number]: NIKI Gallagher  Study quality: Technically difficult  Referring Physician: Chanda St MD  BloodPressure: 168/80 mmHg  Height: 150 cm  Weight: 48 kg  BSA: 1.4 m2  ------------------------------------------------------------------------  PROCEDURE: Transthoracic echocardiogram with 2-D, M-Mode  and complete spectral and color flow Doppler.  INDICATION: Palpitations (R00.2)  ------------------------------------------------------------------------  Dimensions:    Normal Values:  LA:     2.4    2.0 - 4.0 cm  Ao:     3.2    2.0 - 3.8 cm  SEPTUM: 0.7    0.6 - 1.2 cm  PWT:    0.9    0.6 - 1.1 cm  LVIDd:  4.4    3.0 - 5.6 cm  LVIDs:  3.0    1.8 - 4.0 cm  Derived variables:  RWT: 0.40  Fractional short: 32 %  EF (Barraza Rule): 53 %Doppler Peak Velocity (m/sec):  AoV=1.6  ------------------------------------------------------------------------  Observations:  Mitral Valve: Thickened mitral valve Mild mitral  regurgitation.  Aortic Valve/Aorta: Aortic valve not well visualized. Peak  transaortic valve gradient equals 10 mm Hg. Moderate-severe  aortic regurgitation. Peak left ventricular outflow tract  gradient equals 6 mm Hg.  Aortic Root: 3.2 cm.  LVOT diameter: 2.2 cm.  Left Atrium: Normal left atrium.  LA volume index = 25  cc/m2.  Left Ventricle: Mild global left ventricular systolic  dysfunction. Normal left ventricular internal dimensions  and wall thicknesses.  Right Heart: Normal right atrium. The right ventricle is  not well visualized; grossly normal right ventricular  systolic function. Tricuspid valve not well visualized  Pulmonic valve not well visualized.  Pericardium/Pleura: Normal pericardium with no pericardial  effusion.  Hemodynamic: Estimated right atrial pressure is 8 mm Hg.  ------------------------------------------------------------------------  Conclusions:  1. Aortic valve not well visualized. Moderate-severe aortic  regurgitation.  2. Mild global left ventricular systolic dysfunction.  3. The right ventricle is not well visualized; grossly  normal right ventricular systolic function.  *** No previous Echo exam.  ------------------------------------------------------------------------  Confirmed on  8/3/2021 - 17:13:53 by YONIS Dawkins  ------------------------------------------------------------------------    < end of copied text >    [ ]  Catheterization:  [ ] Stress Test:    OTHER:

## 2021-08-09 NOTE — PROGRESS NOTE ADULT - SUBJECTIVE AND OBJECTIVE BOX
Follow Up:  Bacteremia    Interval History:    REVIEW OF SYSTEMS  [  ] ROS unobtainable because:    [  ] All other systems negative except as noted below    Constitutional:  [ ] fever [ ] chills  [ ] weight loss  [ ] weakness  Skin:  [ ] rash [ ] phlebitis	  Eyes: [ ] icterus [ ] pain  [ ] discharge	  ENMT: [ ] sore throat  [ ] thrush [ ] ulcers [ ] exudates  Respiratory: [ ] dyspnea [ ] hemoptysis [ ] cough [ ] sputum	  Cardiovascular:  [ ] chest pain [ ] palpitations [ ] edema	  Gastrointestinal:  [ ] nausea [ ] vomiting [ ] diarrhea [ ] constipation [ ] pain	  Genitourinary:  [ ] dysuria [ ] frequency [ ] hematuria [ ] discharge [ ] flank pain  [ ] incontinence  Musculoskeletal:  [ ] myalgias [ ] arthralgias [ ] arthritis  [ ] back pain  Neurological:  [ ] headache [ ] seizures  [ ] confusion/altered mental status    Allergies  No Known Allergies        ANTIMICROBIALS:  ampicillin  IVPB 2 every 6 hours      OTHER MEDS:  MEDICATIONS  (STANDING):  acetaminophen   Tablet .. 650 every 6 hours PRN  desmopressin 0.1 two times a day  dextrose 40% Gel 15 once  dextrose 50% Injectable 25 once  dextrose 50% Injectable 12.5 once  dextrose 50% Injectable 25 once  glucagon  Injectable 1 once  insulin glargine Injectable (LANTUS) 9 at bedtime  insulin lispro (ADMELOG) corrective regimen sliding scale  three times a day before meals  insulin lispro (ADMELOG) corrective regimen sliding scale  at bedtime  insulin lispro Injectable (ADMELOG) 7 three times a day before meals  levETIRAcetam  IVPB 500 every 12 hours  mirtazapine 15 at bedtime  pantoprazole  Injectable 40 daily  senna 2 at bedtime  venlafaxine 37.5 every 12 hours      Vital Signs Last 24 Hrs  T(C): 36.9 (09 Aug 2021 14:48), Max: 36.9 (09 Aug 2021 14:48)  T(F): 98.5 (09 Aug 2021 14:48), Max: 98.5 (09 Aug 2021 14:48)  HR: 92 (09 Aug 2021 14:48) (91 - 98)  BP: 123/71 (09 Aug 2021 14:48) (123/71 - 170/70)  BP(mean): --  RR: 17 (09 Aug 2021 14:48) (17 - 18)  SpO2: 98% (09 Aug 2021 14:48) (98% - 99%)    PHYSICAL EXAMINATION:  General: Alert and Awake, NAD  HEENT: PERRL, EOMI  Neck: Supple  Cardiac: RRR, No M/R/G  Resp: CTAB, No Wh/Rh/Ra  Abdomen: NBS, NT/ND, No HSM, No rigidity or guarding  MSK: No LE edema. No Calf tenderness  : No diaz  Skin: No rashes or lesions. Skin is warm and dry to the touch.   Neuro: Alert and Awake. CN 2-12 Grossly intact. Moves all four extremities spontaneously.  Psych: Calm, Pleasant, Cooperative                          3.8    4.38  )-----------( 113      ( 09 Aug 2021 12:17 )             13.2       08    140  |  109<H>  |  24<H>  ----------------------------<  262<H>  4.3   |  17<L>  |  1.75<H>    Ca    8.2<L>      09 Aug 2021 12:17  Mg     2.1             Urinalysis Basic - ( 09 Aug 2021 07:31 )    Color: BROWN / Appearance: Slightly Turbid / S.013 / pH: x  Gluc: x / Ketone: Negative  / Bili: Negative / Urobili: Negative   Blood: x / Protein: 30 mg/dL / Nitrite: Negative   Leuk Esterase: Large / RBC: 310 /hpf / WBC 41 /HPF   Sq Epi: x / Non Sq Epi: 2 /hpf / Bacteria: Negative        MICROBIOLOGY:  v  .Blood Blood-Peripheral  21   No Growth Final  --  --      .Blood Blood-Peripheral  21   No Growth Final  --  --      .Blood Blood-Peripheral  21   Growth in aerobic and anaerobic bottles: Enterococcus faecalis  ***Blood Panel PCR results on this specimen are available  approximately 3 hours after the Gram stain result.***  Gram stain, PCR, and/or culture results may not always  correspond dueto difference in methodologies.  ************************************************************  This PCR assay was performed by multiplex PCR. This  Assay tests for 66 bacterial and resistance gene targets.  Please refer to the [facility] Labs test directory  at [email or web address] for details.  --  Blood Culture PCR  Enterococcus faecalis      Catheterized Catheterized  21   >100,000 CFU/ml Enterococcus faecalis  --  Enterococcus faecalis    RADIOLOGY:    <The imaging below has been reviewed and visualized by me independently. Findings as detailed in report below>    EXAM:  XR CHEST PORTABLE URGENT 1V                        PROCEDURE DATE:  2021    Feeding tube tip terminates in the stomach.  Small/moderate right pleural effusion and right middle and lower lobe linear opacities, likely atelectasis unchanged.   Follow Up:  Bacteremia    Interval History: afebrile. no acute overnight events.     REVIEW OF SYSTEMS  [  ] ROS unobtainable because:    [ x ] All other systems negative except as noted below    Constitutional:  [ ] fever [ ] chills  [ ] weight loss  [ ] weakness  Skin:  [ ] rash [ ] phlebitis	  Eyes: [ ] icterus [ ] pain  [ ] discharge	  ENMT: [ ] sore throat  [ ] thrush [ ] ulcers [ ] exudates  Respiratory: [ ] dyspnea [ ] hemoptysis [ ] cough [ ] sputum	  Cardiovascular:  [ ] chest pain [ ] palpitations [ ] edema	  Gastrointestinal:  [ ] nausea [ ] vomiting [ ] diarrhea [ ] constipation [ ] pain	  Genitourinary:  [ ] dysuria [ ] frequency [ ] hematuria [ ] discharge [ ] flank pain  [ ] incontinence  Musculoskeletal:  [ ] myalgias [ ] arthralgias [ ] arthritis  [ ] back pain  Neurological:  [ ] headache [ ] seizures  [ ] confusion/altered mental status    Allergies  No Known Allergies        ANTIMICROBIALS:  ampicillin  IVPB 2 every 6 hours      OTHER MEDS:  MEDICATIONS  (STANDING):  acetaminophen   Tablet .. 650 every 6 hours PRN  desmopressin 0.1 two times a day  dextrose 40% Gel 15 once  dextrose 50% Injectable 25 once  dextrose 50% Injectable 12.5 once  dextrose 50% Injectable 25 once  glucagon  Injectable 1 once  insulin glargine Injectable (LANTUS) 9 at bedtime  insulin lispro (ADMELOG) corrective regimen sliding scale  three times a day before meals  insulin lispro (ADMELOG) corrective regimen sliding scale  at bedtime  insulin lispro Injectable (ADMELOG) 7 three times a day before meals  levETIRAcetam  IVPB 500 every 12 hours  mirtazapine 15 at bedtime  pantoprazole  Injectable 40 daily  senna 2 at bedtime  venlafaxine 37.5 every 12 hours      Vital Signs Last 24 Hrs  T(C): 36.9 (09 Aug 2021 14:48), Max: 36.9 (09 Aug 2021 14:48)  T(F): 98.5 (09 Aug 2021 14:48), Max: 98.5 (09 Aug 2021 14:48)  HR: 92 (09 Aug 2021 14:48) (91 - 98)  BP: 123/71 (09 Aug 2021 14:48) (123/71 - 170/70)  BP(mean): --  RR: 17 (09 Aug 2021 14:48) (17 - 18)  SpO2: 98% (09 Aug 2021 14:48) (98% - 99%)    PHYSICAL EXAMINATION:  General: Alert and Awake, NAD  Cardiac: RRR, No M/R/G  Resp: CTAB, No Wh/Rh/Ra  Abdomen: NBS, NT/ND, No HSM, No rigidity or guarding  MSK: No LE edema. No Calf tenderness  : No diaz  Skin: No rashes or lesions. Skin is warm and dry to the touch.   Neuro: Alert and Awake. CN 2-12 Grossly intact. Moves all four extremities spontaneously.  Psych: Calm, Pleasant, Cooperative                          3.8    4.38  )-----------( 113      ( 09 Aug 2021 12:17 )             13.2       08    140  |  109<H>  |  24<H>  ----------------------------<  262<H>  4.3   |  17<L>  |  1.75<H>    Ca    8.2<L>      09 Aug 2021 12:17  Mg     2.1     0809        Urinalysis Basic - ( 09 Aug 2021 07:31 )    Color: BROWN / Appearance: Slightly Turbid / S.013 / pH: x  Gluc: x / Ketone: Negative  / Bili: Negative / Urobili: Negative   Blood: x / Protein: 30 mg/dL / Nitrite: Negative   Leuk Esterase: Large / RBC: 310 /hpf / WBC 41 /HPF   Sq Epi: x / Non Sq Epi: 2 /hpf / Bacteria: Negative        MICROBIOLOGY:  v  .Blood Blood-Peripheral  21   No Growth Final  --  --      .Blood Blood-Peripheral  21   No Growth Final  --  --      .Blood Blood-Peripheral  21   Growth in aerobic and anaerobic bottles: Enterococcus faecalis  ***Blood Panel PCR results on this specimen are available  approximately 3 hours after the Gram stain result.***  Gram stain, PCR, and/or culture results may not always  correspond dueto difference in methodologies.  ************************************************************  This PCR assay was performed by multiplex PCR. This  Assay tests for 66 bacterial and resistance gene targets.  Please refer to the [facility] Labs test directory  at [email or web address] for details.  --  Blood Culture PCR  Enterococcus faecalis      Catheterized Catheterized  21   >100,000 CFU/ml Enterococcus faecalis  --  Enterococcus faecalis    RADIOLOGY:    <The imaging below has been reviewed and visualized by me independently. Findings as detailed in report below>    EXAM:  XR CHEST PORTABLE URGENT 1V                        PROCEDURE DATE:  2021    Feeding tube tip terminates in the stomach.  Small/moderate right pleural effusion and right middle and lower lobe linear opacities, likely atelectasis unchanged.

## 2021-08-09 NOTE — PROGRESS NOTE ADULT - PROBLEM SELECTOR PLAN 1
-test BG AC/HS. Check BG at 2am tonight  -Increase Lantus to 11 units QHS.  -Increase Admelog 9 units AC meals  -c/w Admelog low correction scale AC and Low HS scale  Discharge plan:  TBD based on GFR. Home regimen may need adjustment. Pt was on Lantus 30 units PTA and is not requiring close to that amount of insulin here.   -Plan discussed with pt/team.  Contact info: [phone] (24/7). pager [phone]

## 2021-08-09 NOTE — PROGRESS NOTE ADULT - SUBJECTIVE AND OBJECTIVE BOX
DIABETES FOLLOW UP NOTE: Saw pt earlier today  INTERVAL HX: Pt stable, alert and able to answer questions properly today. Reports tolerating POs. Denies any pain but reports still having some  hematuria. BG levels variable between 100s to 200s. No hypoglycemia.       Review of Systems:  General: As above  Cardiovascular: No chest pain, palpitations  Respiratory: No SOB, no cough  GI: No nausea, vomiting, abdominal pain  Endocrine: no polyuria, polydipsia or S&Sx of hypoglycemia    Allergies    No Known Allergies    Intolerances      MEDICATIONS:  ampicillin  IVPB 2 Gram(s) IV Intermittent every 6 hours  insulin glargine Injectable (LANTUS) 9 Unit(s) SubCutaneous at bedtime  insulin lispro (ADMELOG) corrective regimen sliding scale   SubCutaneous three times a day before meals  insulin lispro (ADMELOG) corrective regimen sliding scale   SubCutaneous at bedtime  insulin lispro Injectable (ADMELOG) 7 Unit(s) SubCutaneous three times a day before meals      PHYSICAL EXAM:  VITALS: T(C): 36.9 (08-09-21 @ 14:48)  T(F): 98.5 (08-09-21 @ 14:48), Max: 98.5 (08-09-21 @ 14:48)  HR: 92 (08-09-21 @ 14:48) (91 - 98)  BP: 123/71 (08-09-21 @ 14:48) (123/71 - 170/70)  RR:  (17 - 18)  SpO2:  (98% - 99%)  Wt(kg): --  GENERAL: Female laying in bed in NAD  Abdomen: Soft, nontender, non distended  Extremities: Warm, no edema in all 4 exts  NEURO: Alert and able to answer questions appropriately.     LABS:  POCT Blood Glucose.: 265 mg/dL (08-09-21 @ 12:44)  POCT Blood Glucose.: 258 mg/dL (08-09-21 @ 08:49)  POCT Blood Glucose.: 106 mg/dL (08-08-21 @ 21:33)  POCT Blood Glucose.: 147 mg/dL (08-08-21 @ 18:11)  POCT Blood Glucose.: 217 mg/dL (08-08-21 @ 13:57)  POCT Blood Glucose.: 216 mg/dL (08-08-21 @ 12:32)  POCT Blood Glucose.: 254 mg/dL (08-08-21 @ 08:44)  POCT Blood Glucose.: 252 mg/dL (08-07-21 @ 21:36)  POCT Blood Glucose.: 323 mg/dL (08-07-21 @ 17:28)  POCT Blood Glucose.: 246 mg/dL (08-07-21 @ 12:35)  POCT Blood Glucose.: 111 mg/dL (08-07-21 @ 08:32)  POCT Blood Glucose.: 218 mg/dL (08-06-21 @ 21:36)  POCT Blood Glucose.: 198 mg/dL (08-06-21 @ 17:59)                            3.8    4.38  )-----------( 113      ( 09 Aug 2021 12:17 )             13.2       08-09    140  |  109<H>  |  24<H>  ----------------------------<  262<H>  4.3   |  17<L>  |  1.75<H>      EGFR if non : 29<L>    Ca    8.2<L>      08-09  Mg     2.1     08-09        Thyroid Function Tests:  07-29 @ 19:56 TSH 2.45 FreeT4 -- T3 -- Anti TPO -- Anti Thyroglobulin Ab -- TSI --      A1C with Estimated Average Glucose Result: 6.5 % (07-28-21 @ 09:21)      Estimated Average Glucose: 140 mg/dL (07-28-21 @ 09:21)        08-05 Chol 154 Direct LDL -- LDL calculated 93 HDL 28<L> Trig 165<H>

## 2021-08-09 NOTE — PROVIDER CONTACT NOTE (OTHER) - ACTION/TREATMENT ORDERED:
PA notified and aware.  Urinalysis ordered.  Will continue to monitor pt, encourage PO hydration, and maintain safety.  Will endorse to day RN

## 2021-08-09 NOTE — PROGRESS NOTE ADULT - ASSESSMENT
71 yo F Religion, hx of breast Ca on active chemo (unsure of name) and anemia p/w reported vaginal bleeding. Recent admission to [facility] for the same, underwent EPO treatment and discharged. Reported Hgb of 5 during admission. Feeling generally weak, denies chest pain sob. Reports bright red blood in the toilet when she urinates. Denies dysuria, abdominal pain. Patient is unable to receive blood products of any kind.  has been tachycardic.

## 2021-08-09 NOTE — PROGRESS NOTE ADULT - SUBJECTIVE AND OBJECTIVE BOX
Overnight events noted   VITAL: T(C): , Max: 36.7 (21 @ 20:01) T(F): , Max: 98.1 (21 @ 05:24) HR: 98 (21 @ 05:24) BP: 170/70 (21 @ 05:24) RR: 18 (21 @ 05:24) SpO2: 99% (21 @ 05:24)   PHYSICAL EXAM: Constitutional: alert; (+)soft restraints HEENT: DMM, no NGT Neck: Supple, No JVD Respiratory: CTA-b/l Cardiovascular: tachy reg s1s2 Gastrointestinal: BS+, soft, NT/ND : (+)diaz-clear red urine Extremities: No peripheral edema b/l Neurological: no focal deficits; strength grossly intact Back: no CVAT b/l Skin: No rashes, no nevi  LABS:  Na(143)/K(3.5)/Cl(112)/HCO3(20)/BUN(34)/Cr(1.52)Glu(297)/Ca(8.1)/Mg(--)/PO4(--)     @ 10:03  Urinalysis Basic - ( 09 Aug 2021 07:31 ) Color: BROWN / Appearance: Slightly Turbid / S.013 / pH: x Gluc: x / Ketone: Negative  / Bili: Negative / Urobili: Negative  Blood: x / Protein: 30 mg/dL / Nitrite: Negative  Leuk Esterase: Large / RBC: 310 /hpf / WBC 41 /HPF  Sq Epi: x / Non Sq Epi: 2 /hpf / Bacteria: Negative   IMPRESSION: 70F, Latter-day, with breast CA, 21 p/w vaginal bleeding/severe anemia  (1)Renal - GLEN on CKD3- ischemic ATN - resolving/resolved as of 21  (2)Hypernatremia - DI - Na much improved as , on DDAVP and s/p IVF  (3)Hypokalemia - borderline as of   (4)ID - enterococcal bacteremia - on IV Ampicillin  (5)Anemia - severe - in setting of being a Latter-day. Iron deficiency; not indicated for SAWYER. S/p several doses of IV Venofer  (6)Goals of care - awaiting Hospice consult  RECOMMEND: (1)Meds as ordered/dose new meds for GFR 40-50ml/min (2)F/U Hospice input - if not for comfort care, then should recheck bloodwork today    David Nixon MD [facility] Office: [phone] Cell: [phone]        No pain, no sob   VITAL: T(C): , Max: 36.7 (21 @ 20:01) T(F): , Max: 98.1 (21 @ 05:24) HR: 98 (21 @ 05:24) BP: 170/70 (21 @ 05:24) RR: 18 (21 @ 05:24) SpO2: 99% (21 @ 05:24)   PHYSICAL EXAM: Constitutional: alert; NAD HEENT: DMM, no NGT Neck: Supple, No JVD Respiratory: CTA-b/l Cardiovascular: RRR s1s2 Gastrointestinal: BS+, soft, NT/ND : (+)primafit Extremities: No peripheral edema b/l Neurological: no focal deficits; reduced generalized strength Back: no CVAT b/l Skin: No rashes, no nevi  LABS:  Na(143)/K(3.5)/Cl(112)/HCO3(20)/BUN(34)/Cr(1.52)Glu(297)/Ca(8.1)/Mg(--)/PO4(--)     @ 10:03  Urinalysis Basic - ( 09 Aug 2021 07:31 ) Color: BROWN / Appearance: Slightly Turbid / S.013 / pH: x Gluc: x / Ketone: Negative  / Bili: Negative / Urobili: Negative  Blood: x / Protein: 30 mg/dL / Nitrite: Negative  Leuk Esterase: Large / RBC: 310 /hpf / WBC 41 /HPF  Sq Epi: x / Non Sq Epi: 2 /hpf / Bacteria: Negative   IMPRESSION: 70F, Yazidi, with breast CA, 21 p/w vaginal bleeding/severe anemia  (1)Renal - GLEN on CKD3- ischemic ATN - resolving/resolved as of 21  (2)Hypernatremia - DI - Na much improved as , on DDAVP and s/p IVF  (3)Hypokalemia - borderline as of   (4)ID - enterococcal bacteremia - on IV Ampicillin  (5)Anemia - severe - in setting of being a Yazidi. Iron deficiency; not indicated for SAWYER. S/p several doses of IV Venofer  (6)Goals of care - awaiting Hospice consult  RECOMMEND: (1)Meds as ordered/dose new meds for GFR 40-50ml/min (2)F/U Hospice input - if not for comfort care as of today, then we should recheck bloodwork today -d/w'd JUNAID Fallon, [id number]    David Nixon MD [facility] Office: [phone] Cell: [phone]

## 2021-08-09 NOTE — PROGRESS NOTE ADULT - ASSESSMENT
69 yo Female Jehovah witness with PMHx of breast CA s/p BL mastectomy with recurrence and metastasis here with c/o vaginal bleeding, found to have bladder hematoma on pelvic ultrasound. Pt also with R sided nephU with mild hydro, but pt unsure indication for nephU.   on admission: H/H 4.3/13.5, SCr 1.37.  Pt follows up at [facility] and is currently receiving chemotherapy.     7/28: Hct 11.2 from 13.5 yesterday. ptn is pale, lethargic, dyspneic, expresses wishes not to have any blood products based on her GD DOV. ptn placed her proxy on the phone Mr. Gray who wanted to confirm the hospital is respecting ptn's wishes. both ptn and the proxy aksed baout being transferred to [facility]. however ptn is not stable w HGB of 3 to be travelling. denies CP, palps, HA, has ongoing hematuria, CBI in place. Ct C/A/P reviewed. ptn w metastatic dz process on CT scan. Heme consult called. will cont CBI as per . pending CT urogram. cont trending HH. started on IV IRON. prognosis is guarded.   7/29: ptn found unresponsive with a droop, fever, ongoing hematuria, had filled out a MOLST form , she is DNR/DNI, code stroke called, will give Abx, keep temp down w cooling blanket, since ptn cannot receive anticoagulation/is dnr/dni will not get CTA of brain, will place on KEPPRA for sz, get eeg, prognosis is grave. this was d/w ,Mr Gray ptn's proxy. MICU consult reviewed. prognosis is grave  7/30:   ptn is morwe responsive today though minimally, has enterococcal bacteremia. seen by ID , placed on IV AMPICILLIN while awaiting sensitivities. rpt blood cx sent. will check HH in am. all blood draws should be done in pediatric [facility] . GLEN, ongoing 2/2 ischemic ATN, hematuria improving  7/31:  little more responsive today, has hypernatremia, will start free water via NGT. has clots in CBI. rpt hgb3.6, ptn is hyperglycemic. will call endo  8/1: ptn is lethargic, hematuria has improved, now off  CBI, ongoing GLEN and hypernatremia though improved, heme, renal  following. Hyperglycemia, will dc d5W drip, place on standing LANTUS , cont Admelog on a sliding scale, Dr. Guthrie from [facility] called. increase free water intake to 250 q4H per NGT. rpt cbc and BMP in am via pediatric tubes. tranexamic acid, EPO, IV iron, folic acid and B12 as per heme  8/2: awake, alert, responds appropriately. on and off confusion, diaz in place w hematuria, no clots. off CBI.  ptn refused Blood work today. on NGT feeds w free water per NGT, hyperglycemia is ongoing, not controlled, endo on board. will DC IVF  8/3: ptn more awake and alert. on and off confusion. HH improving. heme following, diaz w pink urine. hypernatremia worsening despite FREE water via NGT  8/4: pulled NGT. will need to replace. ptn had filled out a MOLST form prior to arrival. no artifical feeds. remain NPO 2/2 failed swallow study. check HH and Na tomorrow. on IVF. getting procrit today. has pink urine in diaz  8/5: passed S&S, placed on a soft diet, has DI as per renal, placed on DDAVP, hence persistent hypernatremia. on IV ABx for bacteremia, rpt blood cx neg. Diaz in place, pink clear urine. HH is still quite low, on IV iron and procrit.   8/6: dc diaz, TOV as per urology. ABx as per ID. ptn wants to go home. check HH tomorrow. will need home w hospice. once ptn is on hospice i dont think PT is cont, will need to check.  will inquire about SRINI  8/7, 8/8: ptn states she feels fine. wants to go home. GLEN is improving. on DDAVP for DI post CVA. chronic hematuria. passed TOV. glucose still above goal but improving. hospice consult placed  8/9: ptn is AxOx3, wants to go home. she is DNR, she is tolerating a soft diet. she is severely anemic, she refuses transfusions, she has been receiving IV Iron and procrit. she has a chronic hematuria for which no intervention is being offered. she had a CVA, has a resolving right facial droop. ptn is referred to home hospice consult before she leaves

## 2021-08-09 NOTE — PROVIDER CONTACT NOTE (OTHER) - BACKGROUND
Patient admitted for anemia and vaginal bleeding.  Patient is Gnosticism.  PMH of breast cancer and anemia
admitted with anemia, bacteremia, AMS
admitted with anemia, AMS, sepsis, hypotension
71 y/o female admitted with anemia PMH of breast CA on chemo
Pt admitted for anemia, CBI placed
admitted with anemia, bacteremia, AMS

## 2021-08-09 NOTE — PROVIDER CONTACT NOTE (OTHER) - ASSESSMENT
A&O x1-2.  Patient normally with hematuria noted, dark blood-tinged urine.  C/o burning when urinating.  On primafit.  Patient currently afebrile
Pt confused, A&ox1, restless
asymptomatic. pt. appears comfortable.
Pt A&Ox4 pt c/o ab pain to upper abdomen with no relief with tylenol.
VSS. CBI was stopped around 7pm
BP stable, asymptomatic.

## 2021-08-09 NOTE — CHART NOTE - NSCHARTNOTEFT_GEN_A_CORE
Discussed with Dr. Barrett request hospice consult for patient.   Patient alert and oriented to self time and place. Discussed with Dr. Barrett request hospice consult for patient.   Patient alert and oriented to self time and place.  spoke with  Thu made aware

## 2021-08-09 NOTE — PROGRESS NOTE ADULT - ASSESSMENT
69 y/o F, Jew, w/h/o T2DM> HbA1c 6.5% (Skewed due to severe anemia) . DM c/b neuropath, CKD. Also h/o breast Ca on chemo (unsure of name) anemia, HTN, HLD/ Here with severe anemia, acute blood loss 2/2 hematuria> unable ot receive blood. Consult called for management of hyperglycemia. Tolerating POs w/BG values above goal. Will continue to increase insulin to BG goal (100-180mg/dl).

## 2021-08-09 NOTE — PROVIDER CONTACT NOTE (OTHER) - REASON
urine noted to be bright red with small blood clots in diaz
Pt c/o abdominal pain with no relief with tylenol
Patient c/o burning upon urination
HR sustaining 120s, sinus tachycardia, BP = 172/81
Pt pulled out/ NG tube
HR 100s-110s, highest 118

## 2021-08-09 NOTE — PROGRESS NOTE ADULT - ASSESSMENT
71 yo F Congregational, hx of breast Ca on active chemo (unsure of name) and anemia p/w reported vaginal bleeding/hematuria.     Patient with metastatic breast cancer  Now presenting with severe anemia  Found to have CT A/P with nonenhancing debris in the bladder (clot) and soft tissue lesion around the distal right ureter and evidence of likely metastatic neoplasm.     UCx (7/28) with Enterococcus faecalis  BCx (7/29) with Enterococcus faecalis  BCx (7/31) NGTD  BCx (8/4) NGTD    TTE (8/3) was limited and without vegetations but with severe aortic regurgitation    Recommend surveillance BCx 2 weeks after antibiotic completion in light of the severe AR (and as we likely cannot safely pursue KIM)    Overall, Enterococcus bacteremia, UTI, Fever, GLEN    --Continue Ampicillin while admitted  --No ID Objections to discharge planning; switch to Amoxicillin 500 mg PO Q12H (End Date: 8/13/21)  --Recommend surveillance BCx 2 weeks after antibiotic completion (if within GoC / possible to coordinate)  --Continue to follow CBC with diff  --Continue to follow renal function (Cr/BUN)  --Continue to follow transaminases  --Continue to follow temperature curve  --Follow up on prelim BCx     I will follow the patient as needed. Please feel free to contact me with any further questions or concerns.    Eric Stewart M.D.  [facility] Division of Infectious Disease  8AM-5PM: Pager Number [phone]  After Hours (or if no response): Please contact the Infectious Diseases Office at [phone]

## 2021-08-09 NOTE — CHART NOTE - NSCHARTNOTEFT_GEN_A_CORE
SIMA WILL    Notified by RN patient with critical lab result                       3.8    4.38  )-----------( 113      ( 09 Aug 2021 12:17 )             13.2   patient with hematuria   patient alert and oriented x3 to self, time and place mentating well       HPI:  69 yo F Rastafari, hx of breast Ca on active chemo (unsure of name) and anemia p/w reported vaginal bleeding. Recent admission to [facility] for the same, underwent EPO treatment and discharged. Reported Hgb of 5 during admission. Feeling generally weak, denies chest pain sob. Reports bright red blood in the toilet when she urinates. Denies dysuria, abdominal pain. Patient is unable to receive blood products of any kind. (27 Jul 2021 21:01)      # Acute blood loss secondary to vaginal bleeding /hematuria   Interventions taken     Patient is Rastafari refused blood product   - Hematology appreciated started on iron supplement   -consider hospice   discussed with case manger for hospice referral   Discussed with Dr. Barrett above plan   monitor vital signs                         Darleen RAPPC

## 2021-08-09 NOTE — PROGRESS NOTE ADULT - NSICDXPILOT_GEN_ALL_CORE
[place]

## 2021-08-09 NOTE — PROGRESS NOTE ADULT - SUBJECTIVE AND OBJECTIVE BOX
Subjective  Complaining of burning with urination. Primafit in place, tea colored urine.    Objective    Vital signs  T(F): , Max: 98.1 (08-09-21 @ 05:24)  HR: 98 (08-09-21 @ 05:24)  BP: 170/70 (08-09-21 @ 05:24)  SpO2: 99% (08-09-21 @ 05:24)  Wt(kg): --    Output     OUT:    Voided (mL): 1000 mL  Total OUT: 1000 mL    Total NET: -1000 mL          Gen: NAD  Abd: soft, nontender, nondistended  : primafit, tea colored urine    Labs          Culture - Blood (collected 08-04-21 @ 09:07)  Source: .Blood Blood-Venous  Final Report (08-09-21 @ 10:00):    No Growth Final    Culture - Blood (collected 08-04-21 @ 09:07)  Source: .Blood Blood-Peripheral  Final Report (08-09-21 @ 10:00):    No Growth Final

## 2021-08-09 NOTE — PROGRESS NOTE ADULT - ASSESSMENT
71 yo F Sikh, hx of metastatic breast cancer, anemia p/w hematuria, started on CBI, c/b RRT/ code stroke, presumed ischemic CVA    #Anemia, iron deficiency  - pt is JW, does not accept blood products- established by patient per chart review prior to AMS  - received EPO 40, 000 unit 7/28; will hold on giving higher dose therapy (3x/week) as blood sparing treatment due to acute CVA  - s/p IV iron 200mg daily x 8 doses, will dose 2 further doses  - continue folic acid, s/p IM B12  - LIMIT blood draws- no need for routine CBC; agree with Pediatric tubes  - s/p Retacrit 10,000 unit 8/4, due 8/11  - Hg fluctuating, again in 3s    #thrombocytopenia- plt fluctuating, not on AC, monitor    #hematuria-  - US/ CT with no intrauterine source, with + bladder hematoma on CBI, was clamped- restarted due to clots  - prior R nephroureteral catheter with mild residual r hydronephrosis; with soft tissue mass surrounding distal right ureter  - Urology is following  - was redosed tranexamic acid at 10mg/kg dose- 500mg IV over 30 minutes on 7/31 with critically low Hg with bleeding, despite risk of thrombosis  - now off CBI, with pink urine, no clots, continue to monitor    #Breast cancer, metastatic- now with widely metastatic disease  - recently established care at [facility], records in [facility] reviewed- initial dx 1996 R breast cancer s/p surgery, CMF, no endocrine therapy; had local recurrence, treated with endocrine agents  - developed Left breast cancer, s/p surgery, Aromasin  - malignant pleural effusion treated with femara/kisquali, followed by faslodex/ibrance; was following with Dr. 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

## 2021-08-09 NOTE — PROGRESS NOTE ADULT - ATTENDING COMMENTS
Agree with assessment and plan.
Agree with assessment and plan.
Agree with assessment and plan.  Events noted.  Consider nephroureteral stent exchange if no improvement with antibiotic therapy
Patient seen and examined. Agree with assessment and plan.    CBI  Patient declines blood products  Clinical indication for right nephroureteral stent unclear.
Agree with assessment and plan.     Wean CBI    Consider voiding trial if improved.

## 2021-08-09 NOTE — PROGRESS NOTE ADULT - SUBJECTIVE AND OBJECTIVE BOX
Chief Complaint: fu    History of Present Illness: denies complaints, wants to go home; per RN was not able to stand; no f/c, no cp/dyspnea/cough, no n/v/abd pain, urine dark, no clots; no other bleeding      MEDICATIONS  (STANDING):  ampicillin  IVPB 2 Gram(s) IV Intermittent every 6 hours  brimonidine 0.2% Ophthalmic Solution 1 Drop(s) Both EYES two times a day  desmopressin 0.1 milliGRAM(s) Oral two times a day  dextrose 40% Gel 15 Gram(s) Oral once  dextrose 5%. 1000 milliLiter(s) (50 mL/Hr) IV Continuous <Continuous>  dextrose 5%. 1000 milliLiter(s) (100 mL/Hr) IV Continuous <Continuous>  dextrose 50% Injectable 25 Gram(s) IV Push once  dextrose 50% Injectable 12.5 Gram(s) IV Push once  dextrose 50% Injectable 25 Gram(s) IV Push once  folic acid 1 milliGRAM(s) Enteral Tube daily  glucagon  Injectable 1 milliGRAM(s) IntraMuscular once  insulin glargine Injectable (LANTUS) 9 Unit(s) SubCutaneous at bedtime  insulin lispro (ADMELOG) corrective regimen sliding scale   SubCutaneous at bedtime  insulin lispro (ADMELOG) corrective regimen sliding scale   SubCutaneous three times a day before meals  insulin lispro Injectable (ADMELOG) 7 Unit(s) SubCutaneous three times a day before meals  latanoprost 0.005% Ophthalmic Solution 1 Drop(s) Both EYES at bedtime  levETIRAcetam  IVPB 500 milliGRAM(s) IV Intermittent every 12 hours  mirtazapine 15 milliGRAM(s) Oral at bedtime  pantoprazole  Injectable 40 milliGRAM(s) IV Push daily  senna 2 Tablet(s) Oral at bedtime  venlafaxine 37.5 milliGRAM(s) Oral every 12 hours    MEDICATIONS  (PRN):  acetaminophen   Tablet .. 650 milliGRAM(s) Oral every 6 hours PRN Temp greater or equal to 38C (100.4F), Mild Pain (1 - 3)  artificial  tears Solution 1 Drop(s) Both EYES four times a day PRN Dry Eyes      Allergies    No Known Allergies    Intolerances        Vital Signs Last 24 Hrs  T(C): 36.7 (09 Aug 2021 05:24), Max: 36.7 (08 Aug 2021 20:01)  T(F): 98.1 (09 Aug 2021 05:24), Max: 98.1 (09 Aug 2021 05:24)  HR: 98 (09 Aug 2021 05:24) (91 - 98)  BP: 170/70 (09 Aug 2021 05:24) (125/68 - 170/70)  BP(mean): --  RR: 18 (09 Aug 2021 05:24) (18 - 18)  SpO2: 99% (09 Aug 2021 05:24) (99% - 99%)    PHYSICAL EXAM  General: adult in NAD  HEENT: clear oropharynx, anicteric sclera, pink conjunctiva  Neck: supple  CV: normal S1/S2   Lungs: clear to auscultation, no wheezes, no rales  back- + R nephrostomy capped  Abdomen: soft non-tender non-distended,, positive bowel sounds  Ext: no clubbing cyanosis or edema  Skin: no rashes and no petechiae  Lymph Nodes: No LAD in neck  Neuro: alert and oriented X 3, no focal deficits    LABS:                          3.8    4.38  )-----------( 113      ( 09 Aug 2021 12:17 )             13.2         Mean Cell Volume : 126.9 fl  Mean Cell Hemoglobin : 36.5 pg  Mean Cell Hemoglobin Concentration : 28.8 gm/dL  Auto Neutrophil # : x  Auto Lymphocyte # : x  Auto Monocyte # : x  Auto Eosinophil # : x  Auto Basophil # : x  Auto Neutrophil % : x  Auto Lymphocyte % : x  Auto Monocyte % : x  Auto Eosinophil % : x  Auto Basophil % : x      Serial CBC's  08-09 @ 12:17  Hct-13.2 / Hgb-3.8 / Plat-113 / RBC-1.04 / WBC-4.38      08-09    140  |  109<H>  |  24<H>  ----------------------------<  262<H>  4.3   |  17<L>  |  1.75<H>    Ca    8.2<L>      09 Aug 2021 12:17  Mg     2.1     08-09                        Radiology:

## 2021-08-09 NOTE — PROGRESS NOTE ADULT - PROBLEM SELECTOR PLAN 1
Secondary to severe anemia and bacteremia   iron transfusion   IV abx for bacteremia   Monitor on tele  TTE  Monitor BP

## 2021-08-09 NOTE — PROGRESS NOTE ADULT - SUBJECTIVE AND OBJECTIVE BOX
Patient is a 70y old  Female who presents with a chief complaint of severe anemia, acute blood loss 2/2 hematuria (09 Aug 2021 17:34)      SUBJECTIVE / OVERNIGHT EVENTS: ptn is AxOx3, wants to go home. she is DNR, she is tolerating a soft diet. she is severely anemia, she refuses transfusions, sha has been receiving IV Iron and procrit. . she has a chronic hematuria for which no intervention is being offered. she had a CVA, has a resolving right facial droop. ptn is referred to home hospice consult before she leaves    MEDICATIONS  (STANDING):  ampicillin  IVPB 2 Gram(s) IV Intermittent every 6 hours  brimonidine 0.2% Ophthalmic Solution 1 Drop(s) Both EYES two times a day  desmopressin 0.1 milliGRAM(s) Oral two times a day  dextrose 40% Gel 15 Gram(s) Oral once  dextrose 5%. 1000 milliLiter(s) (50 mL/Hr) IV Continuous <Continuous>  dextrose 5%. 1000 milliLiter(s) (100 mL/Hr) IV Continuous <Continuous>  dextrose 50% Injectable 25 Gram(s) IV Push once  dextrose 50% Injectable 12.5 Gram(s) IV Push once  dextrose 50% Injectable 25 Gram(s) IV Push once  folic acid 1 milliGRAM(s) Enteral Tube daily  glucagon  Injectable 1 milliGRAM(s) IntraMuscular once  insulin glargine Injectable (LANTUS) 11 Unit(s) SubCutaneous at bedtime  insulin lispro (ADMELOG) corrective regimen sliding scale   SubCutaneous three times a day before meals  insulin lispro (ADMELOG) corrective regimen sliding scale   SubCutaneous at bedtime  insulin lispro Injectable (ADMELOG) 9 Unit(s) SubCutaneous three times a day with meals  iron sucrose IVPB 200 milliGRAM(s) IV Intermittent every 24 hours  latanoprost 0.005% Ophthalmic Solution 1 Drop(s) Both EYES at bedtime  levETIRAcetam  IVPB 500 milliGRAM(s) IV Intermittent every 12 hours  mirtazapine 15 milliGRAM(s) Oral at bedtime  pantoprazole  Injectable 40 milliGRAM(s) IV Push daily  senna 2 Tablet(s) Oral at bedtime  venlafaxine 37.5 milliGRAM(s) Oral every 12 hours    MEDICATIONS  (PRN):  acetaminophen   Tablet .. 650 milliGRAM(s) Oral every 6 hours PRN Temp greater or equal to 38C (100.4F), Mild Pain (1 - 3)  artificial  tears Solution 1 Drop(s) Both EYES four times a day PRN Dry Eyes      Vital Signs Last 24 Hrs  T(F): 97.8 (21 @ 21:12), Max: 98.5 (21 @ 14:48)  HR: 92 (21 @ 21:12) (92 - 98)  BP: 135/75 (21 @ 21:12) (123/71 - 170/70)  RR: 17 (21 @ 21:12) (17 - 18)  SpO2: 98% (21 @ 21:12) (98% - 99%)  Telemetry:   CAPILLARY BLOOD GLUCOSE      POCT Blood Glucose.: 113 mg/dL (09 Aug 2021 21:39)  POCT Blood Glucose.: 163 mg/dL (09 Aug 2021 17:54)  POCT Blood Glucose.: 265 mg/dL (09 Aug 2021 12:44)  POCT Blood Glucose.: 258 mg/dL (09 Aug 2021 08:49)    I&O's Summary    08 Aug 2021 07:  -  09 Aug 2021 07:00  --------------------------------------------------------  IN: 1060 mL / OUT: 1000 mL / NET: 60 mL    09 Aug 2021 07:01  -  09 Aug 2021 22:51  --------------------------------------------------------  IN: 480 mL / OUT: 1200 mL / NET: -720 mL        PHYSICAL EXAM:  GENERAL: NAD, well-developed  HEAD:  Atraumatic, Normocephalic  EYES: EOMI, PERRLA, conjunctiva and sclera clear  NECK: Supple, No JVD  CHEST/LUNG: Clear to auscultation bilaterally; No wheeze  HEART: Regular rate and rhythm; No murmurs, rubs, or gallops  ABDOMEN: Soft, Nontender, Nondistended; Bowel sounds present  EXTREMITIES:  2+ Peripheral Pulses, No clubbing, cyanosis, or edema  PSYCH: AAOx3  NEUROLOGY: non-focal  SKIN: No rashes or lesions    LABS:                        3.8    4.38  )-----------( 113      ( 09 Aug 2021 12:17 )             13.2     08-    140  |  109<H>  |  24<H>  ----------------------------<  262<H>  4.3   |  17<L>  |  1.75<H>    Ca    8.2<L>      09 Aug 2021 12:17  Mg     2.1                 Urinalysis Basic - ( 09 Aug 2021 07:31 )    Color: BROWN / Appearance: Slightly Turbid / S.013 / pH: x  Gluc: x / Ketone: Negative  / Bili: Negative / Urobili: Negative   Blood: x / Protein: 30 mg/dL / Nitrite: Negative   Leuk Esterase: Large / RBC: 310 /hpf / WBC 41 /HPF   Sq Epi: x / Non Sq Epi: 2 /hpf / Bacteria: Negative        RADIOLOGY & ADDITIONAL TESTS:    Imaging Personally Reviewed:    Consultant(s) Notes Reviewed:      Care Discussed with Consultants/Other Providers:

## 2021-08-09 NOTE — PROVIDER CONTACT NOTE (OTHER) - DATE AND TIME:
30-Jul-2021 16:00
31-Jul-2021 21:00
04-Aug-2021 00:45
31-Jul-2021 01:40
28-Jul-2021 13:20
31-Jul-2021 23:10
09-Aug-2021 06:45

## 2021-08-10 LAB
GLUCOSE BLDC GLUCOMTR-MCNC: 157 MG/DL — HIGH (ref 70–99)
GLUCOSE BLDC GLUCOMTR-MCNC: 179 MG/DL — HIGH (ref 70–99)
GLUCOSE BLDC GLUCOMTR-MCNC: 198 MG/DL — HIGH (ref 70–99)
GLUCOSE BLDC GLUCOMTR-MCNC: 307 MG/DL — HIGH (ref 70–99)
GLUCOSE BLDC GLUCOMTR-MCNC: 350 MG/DL — HIGH (ref 70–99)

## 2021-08-10 PROCEDURE — 99232 SBSQ HOSP IP/OBS MODERATE 35: CPT

## 2021-08-10 RX ORDER — DESMOPRESSIN ACETATE 0.1 MG/1
0.1 TABLET ORAL DAILY
Refills: 0 | Status: DISCONTINUED | OUTPATIENT
Start: 2021-08-10 | End: 2021-08-13

## 2021-08-10 RX ORDER — INSULIN LISPRO 100/ML
10 VIAL (ML) SUBCUTANEOUS
Refills: 0 | Status: DISCONTINUED | OUTPATIENT
Start: 2021-08-10 | End: 2021-08-11

## 2021-08-10 RX ORDER — INSULIN GLARGINE 100 [IU]/ML
12 INJECTION, SOLUTION SUBCUTANEOUS AT BEDTIME
Refills: 0 | Status: DISCONTINUED | OUTPATIENT
Start: 2021-08-10 | End: 2021-08-11

## 2021-08-10 RX ORDER — ERYTHROPOIETIN 10000 [IU]/ML
10000 INJECTION, SOLUTION INTRAVENOUS; SUBCUTANEOUS ONCE
Refills: 0 | Status: COMPLETED | OUTPATIENT
Start: 2021-08-11 | End: 2021-08-11

## 2021-08-10 RX ADMIN — Medication 4: at 12:48

## 2021-08-10 RX ADMIN — Medication 9 UNIT(S): at 12:49

## 2021-08-10 RX ADMIN — LEVETIRACETAM 400 MILLIGRAM(S): 250 TABLET, FILM COATED ORAL at 09:28

## 2021-08-10 RX ADMIN — INSULIN GLARGINE 12 UNIT(S): 100 INJECTION, SOLUTION SUBCUTANEOUS at 21:40

## 2021-08-10 RX ADMIN — Medication 1: at 09:13

## 2021-08-10 RX ADMIN — MIRTAZAPINE 15 MILLIGRAM(S): 45 TABLET, ORALLY DISINTEGRATING ORAL at 21:03

## 2021-08-10 RX ADMIN — LEVETIRACETAM 400 MILLIGRAM(S): 250 TABLET, FILM COATED ORAL at 21:04

## 2021-08-10 RX ADMIN — Medication 650 MILLIGRAM(S): at 14:30

## 2021-08-10 RX ADMIN — PANTOPRAZOLE SODIUM 40 MILLIGRAM(S): 20 TABLET, DELAYED RELEASE ORAL at 11:38

## 2021-08-10 RX ADMIN — Medication 10 UNIT(S): at 17:19

## 2021-08-10 RX ADMIN — Medication 9 UNIT(S): at 09:15

## 2021-08-10 RX ADMIN — BRIMONIDINE TARTRATE 1 DROP(S): 2 SOLUTION/ DROPS OPHTHALMIC at 05:24

## 2021-08-10 RX ADMIN — Medication 216 GRAM(S): at 23:09

## 2021-08-10 RX ADMIN — Medication 216 GRAM(S): at 05:24

## 2021-08-10 RX ADMIN — BRIMONIDINE TARTRATE 1 DROP(S): 2 SOLUTION/ DROPS OPHTHALMIC at 17:20

## 2021-08-10 RX ADMIN — LATANOPROST 1 DROP(S): 0.05 SOLUTION/ DROPS OPHTHALMIC; TOPICAL at 21:03

## 2021-08-10 RX ADMIN — Medication 37.5 MILLIGRAM(S): at 05:24

## 2021-08-10 RX ADMIN — Medication 37.5 MILLIGRAM(S): at 17:20

## 2021-08-10 RX ADMIN — SENNA PLUS 2 TABLET(S): 8.6 TABLET ORAL at 21:03

## 2021-08-10 RX ADMIN — DESMOPRESSIN ACETATE 0.1 MILLIGRAM(S): 0.1 TABLET ORAL at 15:11

## 2021-08-10 RX ADMIN — Medication 216 GRAM(S): at 17:18

## 2021-08-10 RX ADMIN — Medication 1 MILLIGRAM(S): at 11:38

## 2021-08-10 RX ADMIN — DESMOPRESSIN ACETATE 0.1 MILLIGRAM(S): 0.1 TABLET ORAL at 05:24

## 2021-08-10 RX ADMIN — Medication 216 GRAM(S): at 11:38

## 2021-08-10 RX ADMIN — Medication 650 MILLIGRAM(S): at 15:09

## 2021-08-10 RX ADMIN — Medication 4: at 17:18

## 2021-08-10 RX ADMIN — IRON SUCROSE 110 MILLIGRAM(S): 20 INJECTION, SOLUTION INTRAVENOUS at 15:05

## 2021-08-10 RX ADMIN — Medication 650 MILLIGRAM(S): at 21:40

## 2021-08-10 RX ADMIN — Medication 650 MILLIGRAM(S): at 22:40

## 2021-08-10 NOTE — PROGRESS NOTE ADULT - SUBJECTIVE AND OBJECTIVE BOX
Patient is a 70y old  Female who presents with a chief complaint of severe anemia, acute blood loss 2/2 hematuria (10 Aug 2021 14:30)      SUBJECTIVE / OVERNIGHT EVENTS: ptn seen by hospice , full consult to follow. no new developments, cont to have gross hematuria    MEDICATIONS  (STANDING):  ampicillin  IVPB 2 Gram(s) IV Intermittent every 6 hours  brimonidine 0.2% Ophthalmic Solution 1 Drop(s) Both EYES two times a day  desmopressin 0.1 milliGRAM(s) Oral daily  dextrose 40% Gel 15 Gram(s) Oral once  dextrose 5%. 1000 milliLiter(s) (50 mL/Hr) IV Continuous <Continuous>  dextrose 5%. 1000 milliLiter(s) (100 mL/Hr) IV Continuous <Continuous>  dextrose 50% Injectable 25 Gram(s) IV Push once  dextrose 50% Injectable 12.5 Gram(s) IV Push once  dextrose 50% Injectable 25 Gram(s) IV Push once  folic acid 1 milliGRAM(s) Enteral Tube daily  glucagon  Injectable 1 milliGRAM(s) IntraMuscular once  insulin glargine Injectable (LANTUS) 12 Unit(s) SubCutaneous at bedtime  insulin lispro (ADMELOG) corrective regimen sliding scale   SubCutaneous at bedtime  insulin lispro (ADMELOG) corrective regimen sliding scale   SubCutaneous three times a day before meals  insulin lispro Injectable (ADMELOG) 10 Unit(s) SubCutaneous three times a day with meals  latanoprost 0.005% Ophthalmic Solution 1 Drop(s) Both EYES at bedtime  levETIRAcetam  IVPB 500 milliGRAM(s) IV Intermittent every 12 hours  mirtazapine 15 milliGRAM(s) Oral at bedtime  pantoprazole  Injectable 40 milliGRAM(s) IV Push daily  senna 2 Tablet(s) Oral at bedtime  venlafaxine 37.5 milliGRAM(s) Oral every 12 hours    MEDICATIONS  (PRN):  acetaminophen   Tablet .. 650 milliGRAM(s) Oral every 6 hours PRN Temp greater or equal to 38C (100.4F), Mild Pain (1 - 3)  artificial  tears Solution 1 Drop(s) Both EYES four times a day PRN Dry Eyes      Vital Signs Last 24 Hrs  T(F): 98 (08-10-21 @ 20:51), Max: 98.2 (08-10-21 @ 11:40)  HR: 93 (08-10-21 @ 20:51) (93 - 99)  BP: 123/67 (08-10-21 @ 20:51) (123/67 - 135/76)  RR: 18 (08-10-21 @ 20:51) (18 - 18)  SpO2: 98% (08-10-21 @ 20:51) (98% - 98%)  Telemetry:   CAPILLARY BLOOD GLUCOSE      POCT Blood Glucose.: 179 mg/dL (10 Aug 2021 21:33)  POCT Blood Glucose.: 350 mg/dL (10 Aug 2021 17:11)  POCT Blood Glucose.: 307 mg/dL (10 Aug 2021 12:38)  POCT Blood Glucose.: 198 mg/dL (10 Aug 2021 08:51)  POCT Blood Glucose.: 157 mg/dL (10 Aug 2021 02:07)    I&O's Summary    09 Aug 2021 07:01  -  10 Aug 2021 07:00  --------------------------------------------------------  IN: 780 mL / OUT: 2100 mL / NET: -1320 mL    10 Aug 2021 07:01  -  10 Aug 2021 22:28  --------------------------------------------------------  IN: 420 mL / OUT: 500 mL / NET: -80 mL        PHYSICAL EXAM:  GENERAL: NAD, well-developed  HEAD:  Atraumatic, Normocephalic  EYES: EOMI, PERRLA, conjunctiva and sclera clear  NECK: Supple, No JVD  CHEST/LUNG: Clear to auscultation bilaterally; No wheeze  HEART: Regular rate and rhythm; No murmurs, rubs, or gallops  ABDOMEN: Soft, Nontender, Nondistended; Bowel sounds present  EXTREMITIES:  2+ Peripheral Pulses, No clubbing, cyanosis, or edema  PSYCH: AAOx3  NEUROLOGY: non-focal  SKIN: No rashes or lesions    LABS:                        3.8    4.38  )-----------( 113      ( 09 Aug 2021 12:17 )             13.2     08    140  |  109<H>  |  24<H>  ----------------------------<  262<H>  4.3   |  17<L>  |  1.75<H>    Ca    8.2<L>      09 Aug 2021 12:17  Mg     2.1                 Urinalysis Basic - ( 09 Aug 2021 07:31 )    Color: BROWN / Appearance: Slightly Turbid / S.013 / pH: x  Gluc: x / Ketone: Negative  / Bili: Negative / Urobili: Negative   Blood: x / Protein: 30 mg/dL / Nitrite: Negative   Leuk Esterase: Large / RBC: 310 /hpf / WBC 41 /HPF   Sq Epi: x / Non Sq Epi: 2 /hpf / Bacteria: Negative        RADIOLOGY & ADDITIONAL TESTS:    Imaging Personally Reviewed:    Consultant(s) Notes Reviewed:      Care Discussed with Consultants/Other Providers:

## 2021-08-10 NOTE — PROGRESS NOTE ADULT - SUBJECTIVE AND OBJECTIVE BOX
Overnight events noted   VITAL: T(C): , Max: 36.9 (21 @ 14:48) T(F): , Max: 98.5 (21 @ 14:48) HR: 99 (08-10-21 @ 11:40) BP: 133/72 (08-10-21 @ 11:40) BP(mean): -- RR: 18 (08-10-21 @ 11:40) SpO2: 98% (08-10-21 @ 11:40)   PHYSICAL EXAM: Constitutional: alert; NAD HEENT: DMM, no NGT Neck: Supple, No JVD Respiratory: CTA-b/l Cardiovascular: RRR s1s2 Gastrointestinal: BS+, soft, NT/ND : (+)primafit Extremities: No peripheral edema b/l Neurological: no focal deficits; reduced generalized strength Back: no CVAT b/l Skin: No rashes, no nevi  LABS:                      3.8   4.38  )-----------( 113      ( 09 Aug 2021 12:17 )            13.2   Na(140)/K(4.3)/Cl(109)/HCO3(17)/BUN(24)/Cr(1.75)Glu(262)/Ca(8.2)/Mg(2.1)/PO4(--)     @ 12:17  Urinalysis Basic - ( 09 Aug 2021 07:31 )  Color: BROWN / Appearance: Slightly Turbid / S.013 / pH: x Gluc: x / Ketone: Negative  / Bili: Negative / Urobili: Negative  Blood: x / Protein: 30 mg/dL / Nitrite: Negative  Leuk Esterase: Large / RBC: 310 /hpf / WBC 41 /HPF  Sq Epi: x / Non Sq Epi: 2 /hpf / Bacteria: Negative   IMPRESSION: 70F, Moravian, with breast CA, 21 p/w vaginal bleeding/severe anemia  (1)Renal - GLEN on CKD3- ischemic ATN - resolving/resolved as of 21  (2)Hypernatremia - DI - Na much improved - we can reduce the DDAVP to qd at this point  (3)Metabolic acidosis - likely renally mediated - numbers acceptable for now  (4)ID - enterococcal bacteremia - on IV Ampicillin  (5)Anemia - severe - in setting of being a Moravian. Iron deficiency; not indicated for SAWYER. S/p several doses of IV Venofer    RECOMMEND: (1)Reduce DDAVP to 0.1mg po qd (2)Meds as ordered/dose new meds for GFR 40-50ml/min        David Nixon MD [facility] Office: [phone] Cell: [phone]        No pain, no sob receiving iv venofer   VITAL: T(C): , Max: 36.9 (21 @ 14:48) T(F): , Max: 98.5 (21 @ 14:48) HR: 99 (08-10-21 @ 11:40) BP: 133/72 (08-10-21 @ 11:40) BP(mean): -- RR: 18 (08-10-21 @ 11:40) SpO2: 98% (08-10-21 @ 11:40)   PHYSICAL EXAM: Constitutional: alert; NAD HEENT: DMM, no NGT Neck: Supple, No JVD Respiratory: CTA-b/l Cardiovascular: RRR s1s2 Gastrointestinal: BS+, soft, NT/ND : (+)primafit Extremities: No peripheral edema b/l Neurological: no focal deficits; reduced generalized strength Back: no CVAT b/l Skin: No rashes, no nevi  LABS:                      3.8   4.38  )-----------( 113      ( 09 Aug 2021 12:17 )            13.2   Na(140)/K(4.3)/Cl(109)/HCO3(17)/BUN(24)/Cr(1.75)Glu(262)/Ca(8.2)/Mg(2.1)/PO4(--)     @ 12:17  Urinalysis Basic - ( 09 Aug 2021 07:31 )  Color: BROWN / Appearance: Slightly Turbid / S.013 / pH: x Gluc: x / Ketone: Negative  / Bili: Negative / Urobili: Negative  Blood: x / Protein: 30 mg/dL / Nitrite: Negative  Leuk Esterase: Large / RBC: 310 /hpf / WBC 41 /HPF  Sq Epi: x / Non Sq Epi: 2 /hpf / Bacteria: Negative   IMPRESSION: 70F, Mormonism, with breast CA, 21 p/w vaginal bleeding/severe anemia  (1)Renal - GLEN on CKD3- ischemic ATN - resolving/resolved as of 21  (2)Hypernatremia - DI - Na much improved - we can reduce the DDAVP to qd at this point  (3)Metabolic acidosis - likely renally mediated - numbers acceptable for now  (4)ID - enterococcal bacteremia - on IV Ampicillin  (5)Anemia - severe - in setting of being a Mormonism. Iron deficiency; not indicated for SAWYER. S/p several doses of IV Venofer    RECOMMEND: (1)Reduce DDAVP to 0.1mg po qd (2)Meds as ordered/dose new meds for GFR 40-50ml/min        David Nixon MD [facility] Office: [phone] Cell: [phone]

## 2021-08-10 NOTE — PROGRESS NOTE ADULT - PROBLEM SELECTOR PLAN 1
Secondary to severe anemia and bacteremia   iron transfusion   IV abx for bacteremia   Monitor on tele  TTE reviewed   Monitor BP

## 2021-08-10 NOTE — PROGRESS NOTE ADULT - SUBJECTIVE AND OBJECTIVE BOX
DIABETES FOLLOW UP NOTE: Saw pt earlier today  INTERVAL HX: Pt stable, reports feeling tired but denies any pain. Tolerating POs. Per staff pt eating 100% of meals independently. BG gping up ac meals. Noted BG >300s ac lunch. No hypoglycemia. On antibiotic for bacteremia.       Review of Systems:  General: As above  Cardiovascular: No chest pain, palpitations  Respiratory: No SOB, no cough  GI: No nausea, vomiting, abdominal pain  Endocrine: No polyuria, polydipsia or S&Sx of hypoglycemia    Allergies    No Known Allergies    Intolerances      MEDICATIONS:  ampicillin  IVPB 2 Gram(s) IV Intermittent every 6 hours  insulin glargine Injectable (LANTUS) 11 Unit(s) SubCutaneous at bedtime  insulin lispro (ADMELOG) corrective regimen sliding scale   SubCutaneous at bedtime  insulin lispro (ADMELOG) corrective regimen sliding scale   SubCutaneous three times a day before meals  insulin lispro Injectable (ADMELOG) 9 Unit(s) SubCutaneous three times a day with meals      PHYSICAL EXAM:  VITALS: T(C): 36.8 (08-10-21 @ 11:40)  T(F): 98.2 (08-10-21 @ 11:40), Max: 98.5 (08-09-21 @ 14:48)  HR: 99 (08-10-21 @ 11:40) (92 - 99)  BP: 133/72 (08-10-21 @ 11:40) (123/71 - 135/76)  RR:  (17 - 18)  SpO2:  (98% - 98%)  Wt(kg): --  GENERAL: Female laying in bed in NAD  Abdomen: Soft, nontender, non distended  Extremities: Warm, no edema in all 4 exts  NEURO: Alert and able to answer all questions appropriately     LABS:  POCT Blood Glucose.: 307 mg/dL (08-10-21 @ 12:38)  POCT Blood Glucose.: 198 mg/dL (08-10-21 @ 08:51)  POCT Blood Glucose.: 157 mg/dL (08-10-21 @ 02:07)  POCT Blood Glucose.: 113 mg/dL (08-09-21 @ 21:39)  POCT Blood Glucose.: 163 mg/dL (08-09-21 @ 17:54)  POCT Blood Glucose.: 265 mg/dL (08-09-21 @ 12:44)  POCT Blood Glucose.: 258 mg/dL (08-09-21 @ 08:49)  POCT Blood Glucose.: 106 mg/dL (08-08-21 @ 21:33)  POCT Blood Glucose.: 147 mg/dL (08-08-21 @ 18:11)  POCT Blood Glucose.: 217 mg/dL (08-08-21 @ 13:57)  POCT Blood Glucose.: 216 mg/dL (08-08-21 @ 12:32)  POCT Blood Glucose.: 254 mg/dL (08-08-21 @ 08:44)  POCT Blood Glucose.: 252 mg/dL (08-07-21 @ 21:36)  POCT Blood Glucose.: 323 mg/dL (08-07-21 @ 17:28)                            3.8    4.38  )-----------( 113      ( 09 Aug 2021 12:17 )             13.2       08-09    140  |  109<H>  |  24<H>  ----------------------------<  262<H>  4.3   |  17<L>  |  1.75<H>      EGFR if non : 29<L>    Ca    8.2<L>      08-09  Mg     2.1     08-09        Thyroid Function Tests:  07-29 @ 19:56 TSH 2.45 FreeT4 -- T3 -- Anti TPO -- Anti Thyroglobulin Ab -- TSI --      A1C with Estimated Average Glucose Result: 6.5 % (07-28-21 @ 09:21)      Estimated Average Glucose: 140 mg/dL (07-28-21 @ 09:21)        08-05 Chol 154 Direct LDL -- LDL calculated 93 HDL 28<L> Trig 165<H>

## 2021-08-10 NOTE — PROGRESS NOTE ADULT - ASSESSMENT
71 yo F Rastafari, hx of breast Ca on active chemo (unsure of name) and anemia p/w reported vaginal bleeding. Recent admission to [facility] for the same, underwent EPO treatment and discharged. Reported Hgb of 5 during admission. Feeling generally weak, denies chest pain sob. Reports bright red blood in the toilet when she urinates. Denies dysuria, abdominal pain. Patient is unable to receive blood products of any kind.  has been tachycardic.

## 2021-08-10 NOTE — PROGRESS NOTE ADULT - SUBJECTIVE AND OBJECTIVE BOX
Chief Complaint: fu    History of Present Illness: feels ok; no f/c, no cp/dyspnea/cough, no n/v, +abd discomfort, last BM a few days ago;  urine container emptied; no other bleeding      MEDICATIONS  (STANDING):  ampicillin  IVPB 2 Gram(s) IV Intermittent every 6 hours  brimonidine 0.2% Ophthalmic Solution 1 Drop(s) Both EYES two times a day  desmopressin 0.1 milliGRAM(s) Oral two times a day  dextrose 40% Gel 15 Gram(s) Oral once  dextrose 5%. 1000 milliLiter(s) (50 mL/Hr) IV Continuous <Continuous>  dextrose 5%. 1000 milliLiter(s) (100 mL/Hr) IV Continuous <Continuous>  dextrose 50% Injectable 25 Gram(s) IV Push once  dextrose 50% Injectable 12.5 Gram(s) IV Push once  dextrose 50% Injectable 25 Gram(s) IV Push once  folic acid 1 milliGRAM(s) Enteral Tube daily  glucagon  Injectable 1 milliGRAM(s) IntraMuscular once  insulin glargine Injectable (LANTUS) 11 Unit(s) SubCutaneous at bedtime  insulin lispro (ADMELOG) corrective regimen sliding scale   SubCutaneous at bedtime  insulin lispro (ADMELOG) corrective regimen sliding scale   SubCutaneous three times a day before meals  insulin lispro Injectable (ADMELOG) 9 Unit(s) SubCutaneous three times a day with meals  iron sucrose IVPB 200 milliGRAM(s) IV Intermittent every 24 hours  latanoprost 0.005% Ophthalmic Solution 1 Drop(s) Both EYES at bedtime  levETIRAcetam  IVPB 500 milliGRAM(s) IV Intermittent every 12 hours  mirtazapine 15 milliGRAM(s) Oral at bedtime  pantoprazole  Injectable 40 milliGRAM(s) IV Push daily  senna 2 Tablet(s) Oral at bedtime  venlafaxine 37.5 milliGRAM(s) Oral every 12 hours    MEDICATIONS  (PRN):  acetaminophen   Tablet .. 650 milliGRAM(s) Oral every 6 hours PRN Temp greater or equal to 38C (100.4F), Mild Pain (1 - 3)  artificial  tears Solution 1 Drop(s) Both EYES four times a day PRN Dry Eyes      Allergies    No Known Allergies    Intolerances        Vital Signs Last 24 Hrs  T(C): 36.8 (10 Aug 2021 11:40), Max: 36.9 (09 Aug 2021 14:48)  T(F): 98.2 (10 Aug 2021 11:40), Max: 98.5 (09 Aug 2021 14:48)  HR: 99 (10 Aug 2021 11:40) (92 - 99)  BP: 133/72 (10 Aug 2021 11:40) (123/71 - 135/76)  BP(mean): --  RR: 18 (10 Aug 2021 11:40) (17 - 18)  SpO2: 98% (10 Aug 2021 11:40) (98% - 98%)    PHYSICAL EXAM  General: adult in NAD, pale  HEENT: clear oropharynx, anicteric sclera, pink conjunctiva  Neck: supple  CV: normal S1/S2   Lungs: clear to auscultation, no wheezes, no rales  Abdomen: soft, mild TTP, non-distended, positive bowel sounds  Ext: no clubbing cyanosis or edema  Skin: no rashes and no petechiae  Lymph Nodes: No LAD in neck  Neuro: alert and oriented X 3, no focal deficits    LABS:                          3.8    4.38  )-----------( 113      ( 09 Aug 2021 12:17 )             13.2         Mean Cell Volume : 126.9 fl  Mean Cell Hemoglobin : 36.5 pg  Mean Cell Hemoglobin Concentration : 28.8 gm/dL  Auto Neutrophil # : x  Auto Lymphocyte # : x  Auto Monocyte # : x  Auto Eosinophil # : x  Auto Basophil # : x  Auto Neutrophil % : x  Auto Lymphocyte % : x  Auto Monocyte % : x  Auto Eosinophil % : x  Auto Basophil % : x      Serial CBC's  08-09 @ 12:17  Hct-13.2 / Hgb-3.8 / Plat-113 / RBC-1.04 / WBC-4.38      08-09    140  |  109<H>  |  24<H>  ----------------------------<  262<H>  4.3   |  17<L>  |  1.75<H>    Ca    8.2<L>      09 Aug 2021 12:17  Mg     2.1     08-09                        Radiology:

## 2021-08-10 NOTE — PROGRESS NOTE ADULT - ASSESSMENT
69 yo Female Jehovah witness with PMHx of breast CA s/p BL mastectomy with recurrence and metastasis here with c/o vaginal bleeding, found to have bladder hematoma on pelvic ultrasound. Pt also with R sided nephU with mild hydro, but pt unsure indication for nephU.   on admission: H/H 4.3/13.5, SCr 1.37.  Pt follows up at [facility] and is currently receiving chemotherapy.     7/28: Hct 11.2 from 13.5 yesterday. ptn is pale, lethargic, dyspneic, expresses wishes not to have any blood products based on her GD DOV. ptn placed her proxy on the phone Mr. Gray who wanted to confirm the hospital is respecting ptn's wishes. both ptn and the proxy aksed baout being transferred to [facility]. however ptn is not stable w HGB of 3 to be travelling. denies CP, palps, HA, has ongoing hematuria, CBI in place. Ct C/A/P reviewed. ptn w metastatic dz process on CT scan. Heme consult called. will cont CBI as per . pending CT urogram. cont trending HH. started on IV IRON. prognosis is guarded.   7/29: ptn found unresponsive with a droop, fever, ongoing hematuria, had filled out a MOLST form , she is DNR/DNI, code stroke called, will give Abx, keep temp down w cooling blanket, since ptn cannot receive anticoagulation/is dnr/dni will not get CTA of brain, will place on KEPPRA for sz, get eeg, prognosis is grave. this was d/w ,Mr Gray ptn's proxy. MICU consult reviewed. prognosis is grave  7/30:   ptn is morwe responsive today though minimally, has enterococcal bacteremia. seen by ID , placed on IV AMPICILLIN while awaiting sensitivities. rpt blood cx sent. will check HH in am. all blood draws should be done in pediatric [facility] . GLEN, ongoing 2/2 ischemic ATN, hematuria improving  7/31:  little more responsive today, has hypernatremia, will start free water via NGT. has clots in CBI. rpt hgb3.6, ptn is hyperglycemic. will call endo  8/1: ptn is lethargic, hematuria has improved, now off  CBI, ongoing GLEN and hypernatremia though improved, heme, renal  following. Hyperglycemia, will dc d5W drip, place on standing LANTUS , cont Admelog on a sliding scale, Dr. Guthrie from [facility] called. increase free water intake to 250 q4H per NGT. rpt cbc and BMP in am via pediatric tubes. tranexamic acid, EPO, IV iron, folic acid and B12 as per heme  8/2: awake, alert, responds appropriately. on and off confusion, diaz in place w hematuria, no clots. off CBI.  ptn refused Blood work today. on NGT feeds w free water per NGT, hyperglycemia is ongoing, not controlled, endo on board. will DC IVF  8/3: ptn more awake and alert. on and off confusion. HH improving. heme following, diaz w pink urine. hypernatremia worsening despite FREE water via NGT  8/4: pulled NGT. will need to replace. ptn had filled out a MOLST form prior to arrival. no artifical feeds. remain NPO 2/2 failed swallow study. check HH and Na tomorrow. on IVF. getting procrit today. has pink urine in diaz  8/5: passed S&S, placed on a soft diet, has DI as per renal, placed on DDAVP, hence persistent hypernatremia. on IV ABx for bacteremia, rpt blood cx neg. Diaz in place, pink clear urine. HH is still quite low, on IV iron and procrit.   8/6: dc diaz, TOV as per urology. ABx as per ID. ptn wants to go home. check HH tomorrow. will need home w hospice. once ptn is on hospice i dont think PT is cont, will need to check.  will inquire about SRINI  8/7, 8/8: ptn states she feels fine. wants to go home. GLEN is improving. on DDAVP for DI post CVA. chronic hematuria. passed TOV. glucose still above goal but improving. hospice consult placed  8/9: ptn is AxOx3, wants to go home. she is DNR, she is tolerating a soft diet. she is severely anemic, she refuses transfusions, she has been receiving IV Iron and procrit. she has a chronic hematuria for which no intervention is being offered. she had a CVA, has a resolving right facial droop. ptn is referred to home hospice consult before she leaves  8/10: AxOx3, on IV Abx prob through 8/14 for enterococcal bacteremia 7/29, on 7/31 blood cx neg. has chronic gross hematuria, no clots, HH is severely low, refuses transfusions 2/2 Jainism believes. has metastatic breast Ca. awaiting home hospice consult.

## 2021-08-10 NOTE — PROGRESS NOTE ADULT - PROBLEM SELECTOR PLAN 1
-test BG AC/HS. Check BG at 2am tonight  -Increase Lantus to 12 units QHS.  -Increase Admelog 10 units AC meals  -c/w Admelog low correction scale AC and Low HS scale  Discharge plan:  TBD based on GFR. Home regimen may need adjustment. Pt was on Lantus 30 units PTA and is not requiring close to that amount of insulin here.   -Plan discussed with pt/team.  Contact info: [phone] (24/7). pager [phone]

## 2021-08-10 NOTE — PROGRESS NOTE ADULT - ASSESSMENT
71 yo F Buddhist, hx of metastatic breast cancer, anemia p/w hematuria, started on CBI, c/b RRT/ code stroke, presumed ischemic CVA    #Anemia, iron deficiency  - pt is JW, does not accept blood products- established by patient per chart review prior to AMS  - received EPO 40, 000 unit 7/28; will hold on giving higher dose therapy (3x/week) as blood sparing treatment due to acute CVA  - s/p IV iron 200mg daily x 9 doses, for 1 further dose today  - continue folic acid, s/p IM B12  - LIMIT blood draws- no need for routine CBC; agree with Pediatric tubes  - s/p Retacrit 10,000 unit 8/4, 10,000 unit orderd for 8/11  - Hg fluctuating, again in 3s    #thrombocytopenia- plt fluctuating, not on AC, monitor    #hematuria-  - US/ CT with no intrauterine source, with + bladder hematoma on CBI, was clamped- restarted due to clots  - prior R nephroureteral catheter with mild residual r hydronephrosis; with soft tissue mass surrounding distal right ureter  - Urology is following  - was redosed tranexamic acid at 10mg/kg dose- 500mg IV over 30 minutes on 7/31 with critically low Hg with bleeding, despite risk of thrombosis  - now off CBI, with pink urine, no clots, continue to monitor    #Breast cancer, metastatic- now with widely metastatic disease  - recently established care at [facility], records in EPIC reviewed- initial dx 1996 R breast cancer s/p surgery, CMF, no endocrine therapy; had local recurrence, treated with endocrine agents  - developed Left breast cancer, s/p surgery, Aromasin  - malignant pleural effusion treated with femara/kisquali, followed by faslodex/ibrance; was following with Dr. 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

## 2021-08-10 NOTE — HOSPICE CARE NOTE - CONVESATION DETAILS
Patient is appropriate for routine level of hospice care however it is unclear if home is a discharge option as it appears that patient lives with a roommate. It is unclear if patient has the support necessary to be on home hospice. [facility] has not been provided with contact information of HCP, documented as  Isaac. Please call Evette at [phone] to discuss d/c planning. Otherwise, [facility] RN to visit patient bedside tomorrow.

## 2021-08-10 NOTE — PROGRESS NOTE ADULT - SUBJECTIVE AND OBJECTIVE BOX
Subjective: Patient seen and examined. No new events except as noted.     REVIEW OF SYSTEMS:    CONSTITUTIONAL: +weakness, fevers or chills  EYES/ENT: No visual changes;  No vertigo or throat pain   NECK: No pain or stiffness  RESPIRATORY: No cough, wheezing, hemoptysis; No shortness of breath  CARDIOVASCULAR: No chest pain or palpitations  GASTROINTESTINAL: No abdominal or epigastric pain. No nausea, vomiting, or hematemesis; No diarrhea or constipation. No melena or hematochezia.  GENITOURINARY: No dysuria, frequency or hematuria  NEUROLOGICAL: No numbness or weakness  SKIN: No itching, burning, rashes, or lesions   All other review of systems is negative unless indicated above.    MEDICATIONS:  MEDICATIONS  (STANDING):  ampicillin  IVPB 2 Gram(s) IV Intermittent every 6 hours  brimonidine 0.2% Ophthalmic Solution 1 Drop(s) Both EYES two times a day  desmopressin 0.1 milliGRAM(s) Oral two times a day  dextrose 40% Gel 15 Gram(s) Oral once  dextrose 5%. 1000 milliLiter(s) (50 mL/Hr) IV Continuous <Continuous>  dextrose 5%. 1000 milliLiter(s) (100 mL/Hr) IV Continuous <Continuous>  dextrose 50% Injectable 25 Gram(s) IV Push once  dextrose 50% Injectable 12.5 Gram(s) IV Push once  dextrose 50% Injectable 25 Gram(s) IV Push once  folic acid 1 milliGRAM(s) Enteral Tube daily  glucagon  Injectable 1 milliGRAM(s) IntraMuscular once  insulin glargine Injectable (LANTUS) 11 Unit(s) SubCutaneous at bedtime  insulin lispro (ADMELOG) corrective regimen sliding scale   SubCutaneous three times a day before meals  insulin lispro (ADMELOG) corrective regimen sliding scale   SubCutaneous at bedtime  insulin lispro Injectable (ADMELOG) 9 Unit(s) SubCutaneous three times a day with meals  iron sucrose IVPB 200 milliGRAM(s) IV Intermittent every 24 hours  latanoprost 0.005% Ophthalmic Solution 1 Drop(s) Both EYES at bedtime  levETIRAcetam  IVPB 500 milliGRAM(s) IV Intermittent every 12 hours  mirtazapine 15 milliGRAM(s) Oral at bedtime  pantoprazole  Injectable 40 milliGRAM(s) IV Push daily  senna 2 Tablet(s) Oral at bedtime  venlafaxine 37.5 milliGRAM(s) Oral every 12 hours      PHYSICAL EXAM:  T(C): 36.3 (08-10-21 @ 05:25), Max: 36.9 (08-09-21 @ 14:48)  HR: 98 (08-10-21 @ 05:25) (92 - 98)  BP: 135/76 (08-10-21 @ 05:25) (123/71 - 135/76)  RR: 18 (08-10-21 @ 05:25) (17 - 18)  SpO2: 98% (08-10-21 @ 05:25) (98% - 98%)  Wt(kg): --  I&O's Summary    09 Aug 2021 07:01  -  10 Aug 2021 07:00  --------------------------------------------------------  IN: 780 mL / OUT: 2100 mL / NET: -1320 mL          Appearance: NAD  HEENT:   Dry  oral mucosa, PERRL, EOMI	  Lymphatic: No lymphadenopathy , no edema  Cardiovascular: Normal S1 S2, No JVD,+ murmurs , Peripheral pulses palpable 2+ bilaterally  Respiratory: Decreased bs   Gastrointestinal:  Soft, Non-tender, + BS	  Skin: No rashes, No ecchymoses, No cyanosis, warm to touch  Musculoskeletal: Normal range of motion, normal strength  Psychiatry:  sleepy   Ext: No edema      LABS:    CARDIAC MARKERS:                                3.8    4.38  )-----------( 113      ( 09 Aug 2021 12:17 )             13.2     08-09    140  |  109<H>  |  24<H>  ----------------------------<  262<H>  4.3   |  17<L>  |  1.75<H>    Ca    8.2<L>      09 Aug 2021 12:17  Mg     2.1     08-09      proBNP:   Lipid Profile:   HgA1c:   TSH:     14          TELEMETRY: 	    ECG:  	  RADIOLOGY:   DIAGNOSTIC TESTING:  [ ] Echocardiogram:  e< from: Transthoracic Echocardiogram (08.03.21 @ 07:51) >    Patient name: SIMA WILL  YOB: 1950   Age: 70 (F)   MR#: [id number]  Study Date: 8/3/2021  [id number]  Study quality: Technically difficult  Referring Physician: Chanda St MD  BloodPressure: 168/80 mmHg  Height: 150 cm  Weight: 48 kg  BSA: 1.4 m2  ------------------------------------------------------------------------  PROCEDURE: Transthoracic echocardiogram with 2-D, M-Mode  and complete spectral and color flow Doppler.  INDICATION: Palpitations (R00.2)  ------------------------------------------------------------------------  Dimensions:    Normal Values:  LA:     2.4    2.0 - 4.0 cm  Ao:     3.2    2.0 - 3.8 cm  SEPTUM: 0.7    0.6 - 1.2 cm  PWT:    0.9    0.6 - 1.1 cm  LVIDd:  4.4    3.0 - 5.6 cm  LVIDs:  3.0    1.8 - 4.0 cm  Derived variables:  RWT: 0.40  Fractional short: 32 %  EF (Barraza Rule): 53 %Doppler Peak Velocity (m/sec):  AoV=1.6  ------------------------------------------------------------------------  Observations:  Mitral Valve: Thickened mitral valve Mild mitral  regurgitation.  Aortic Valve/Aorta: Aortic valve not well visualized. Peak  transaortic valve gradient equals 10 mm Hg. Moderate-severe  aortic regurgitation. Peak left ventricular outflow tract  gradient equals 6 mm Hg.  Aortic Root: 3.2 cm.  LVOT diameter: 2.2 cm.  Left Atrium: Normal left atrium.  LA volume index = 25  cc/m2.  Left Ventricle: Mild global left ventricular systolic  dysfunction. Normal left ventricular internal dimensions  and wall thicknesses.  Right Heart: Normal right atrium. The right ventricle is  not well visualized; grossly normal right ventricular  systolic function. Tricuspid valve not well visualized  Pulmonic valve not well visualized.  Pericardium/Pleura: Normal pericardium with no pericardial  effusion.  Hemodynamic: Estimated right atrial pressure is 8 mm Hg.  ------------------------------------------------------------------------  Conclusions:  1. Aortic valve not well visualized. Moderate-severe aortic  regurgitation.  2. Mild global left ventricular systolic dysfunction.  3. The right ventricle is not well visualized; grossly  normal right ventricular systolic function.  *** No previous Echo exam.  ------------------------------------------------------------------------  Confirmed on  8/3/2021 - 17:13:53 by YONIS Dawkins  ------------------------------------------------------------------------    < end of copied text >    [ ]  Catheterization:  [ ] Stress Test:    OTHER:

## 2021-08-10 NOTE — PROGRESS NOTE ADULT - ASSESSMENT
71 y/o F, Amish, w/h/o T2DM> HbA1c 6.5% (Skewed due to severe anemia) . DM c/b neuropath, CKD. Also h/o breast Ca on chemo (unsure of name) anemia, HTN, HLD. Here with severe anemia, acute blood loss 2/2 hematuria> unable to receive blood. Consult called for management of hyperglycemia. Tolerating POs w/BG values above goal. Will continue to increase insulin to BG goal (100-180mg/dl). Pt is DNR.

## 2021-08-11 LAB
GLUCOSE BLDC GLUCOMTR-MCNC: 120 MG/DL — HIGH (ref 70–99)
GLUCOSE BLDC GLUCOMTR-MCNC: 141 MG/DL — HIGH (ref 70–99)
GLUCOSE BLDC GLUCOMTR-MCNC: 237 MG/DL — HIGH (ref 70–99)
GLUCOSE BLDC GLUCOMTR-MCNC: 69 MG/DL — LOW (ref 70–99)
GLUCOSE BLDC GLUCOMTR-MCNC: 81 MG/DL — SIGNIFICANT CHANGE UP (ref 70–99)
GLUCOSE BLDC GLUCOMTR-MCNC: 98 MG/DL — SIGNIFICANT CHANGE UP (ref 70–99)

## 2021-08-11 PROCEDURE — 99232 SBSQ HOSP IP/OBS MODERATE 35: CPT

## 2021-08-11 RX ORDER — INSULIN GLARGINE 100 [IU]/ML
9 INJECTION, SOLUTION SUBCUTANEOUS AT BEDTIME
Refills: 0 | Status: DISCONTINUED | OUTPATIENT
Start: 2021-08-11 | End: 2021-08-12

## 2021-08-11 RX ORDER — INSULIN LISPRO 100/ML
8 VIAL (ML) SUBCUTANEOUS
Refills: 0 | Status: DISCONTINUED | OUTPATIENT
Start: 2021-08-11 | End: 2021-08-13

## 2021-08-11 RX ADMIN — LEVETIRACETAM 400 MILLIGRAM(S): 250 TABLET, FILM COATED ORAL at 21:33

## 2021-08-11 RX ADMIN — INSULIN GLARGINE 9 UNIT(S): 100 INJECTION, SOLUTION SUBCUTANEOUS at 21:33

## 2021-08-11 RX ADMIN — PANTOPRAZOLE SODIUM 40 MILLIGRAM(S): 20 TABLET, DELAYED RELEASE ORAL at 09:22

## 2021-08-11 RX ADMIN — Medication 8 UNIT(S): at 17:56

## 2021-08-11 RX ADMIN — Medication 216 GRAM(S): at 05:38

## 2021-08-11 RX ADMIN — Medication 1 MILLIGRAM(S): at 09:25

## 2021-08-11 RX ADMIN — Medication 216 GRAM(S): at 11:30

## 2021-08-11 RX ADMIN — Medication 37.5 MILLIGRAM(S): at 05:37

## 2021-08-11 RX ADMIN — Medication 10 UNIT(S): at 09:23

## 2021-08-11 RX ADMIN — LATANOPROST 1 DROP(S): 0.05 SOLUTION/ DROPS OPHTHALMIC; TOPICAL at 21:33

## 2021-08-11 RX ADMIN — MIRTAZAPINE 15 MILLIGRAM(S): 45 TABLET, ORALLY DISINTEGRATING ORAL at 21:34

## 2021-08-11 RX ADMIN — ERYTHROPOIETIN 10000 UNIT(S): 10000 INJECTION, SOLUTION INTRAVENOUS; SUBCUTANEOUS at 06:33

## 2021-08-11 RX ADMIN — SENNA PLUS 2 TABLET(S): 8.6 TABLET ORAL at 21:34

## 2021-08-11 RX ADMIN — LEVETIRACETAM 400 MILLIGRAM(S): 250 TABLET, FILM COATED ORAL at 09:22

## 2021-08-11 RX ADMIN — DESMOPRESSIN ACETATE 0.1 MILLIGRAM(S): 0.1 TABLET ORAL at 09:25

## 2021-08-11 RX ADMIN — Medication 216 GRAM(S): at 23:43

## 2021-08-11 RX ADMIN — Medication 37.5 MILLIGRAM(S): at 17:16

## 2021-08-11 RX ADMIN — BRIMONIDINE TARTRATE 1 DROP(S): 2 SOLUTION/ DROPS OPHTHALMIC at 05:37

## 2021-08-11 RX ADMIN — Medication 216 GRAM(S): at 17:16

## 2021-08-11 RX ADMIN — BRIMONIDINE TARTRATE 1 DROP(S): 2 SOLUTION/ DROPS OPHTHALMIC at 17:16

## 2021-08-11 NOTE — PROGRESS NOTE ADULT - SUBJECTIVE AND OBJECTIVE BOX
Subjective: Patient seen and examined. No new events except as noted.     REVIEW OF SYSTEMS:    CONSTITUTIONAL:+ weakness, fevers or chills  EYES/ENT: No visual changes;  No vertigo or throat pain   NECK: No pain or stiffness  RESPIRATORY: No cough, wheezing, hemoptysis; No shortness of breath  CARDIOVASCULAR: No chest pain or palpitations  GASTROINTESTINAL: No abdominal or epigastric pain. No nausea, vomiting, or hematemesis; No diarrhea or constipation. No melena or hematochezia.  GENITOURINARY: No dysuria, frequency or hematuria  NEUROLOGICAL: No numbness or weakness  SKIN: No itching, burning, rashes, or lesions   All other review of systems is negative unless indicated above.    MEDICATIONS:  MEDICATIONS  (STANDING):  ampicillin  IVPB 2 Gram(s) IV Intermittent every 6 hours  brimonidine 0.2% Ophthalmic Solution 1 Drop(s) Both EYES two times a day  desmopressin 0.1 milliGRAM(s) Oral daily  dextrose 40% Gel 15 Gram(s) Oral once  dextrose 5%. 1000 milliLiter(s) (50 mL/Hr) IV Continuous <Continuous>  dextrose 5%. 1000 milliLiter(s) (100 mL/Hr) IV Continuous <Continuous>  dextrose 50% Injectable 25 Gram(s) IV Push once  dextrose 50% Injectable 12.5 Gram(s) IV Push once  dextrose 50% Injectable 25 Gram(s) IV Push once  folic acid 1 milliGRAM(s) Enteral Tube daily  glucagon  Injectable 1 milliGRAM(s) IntraMuscular once  insulin glargine Injectable (LANTUS) 12 Unit(s) SubCutaneous at bedtime  insulin lispro (ADMELOG) corrective regimen sliding scale   SubCutaneous three times a day before meals  insulin lispro (ADMELOG) corrective regimen sliding scale   SubCutaneous at bedtime  insulin lispro Injectable (ADMELOG) 10 Unit(s) SubCutaneous three times a day with meals  latanoprost 0.005% Ophthalmic Solution 1 Drop(s) Both EYES at bedtime  levETIRAcetam  IVPB 500 milliGRAM(s) IV Intermittent every 12 hours  mirtazapine 15 milliGRAM(s) Oral at bedtime  pantoprazole  Injectable 40 milliGRAM(s) IV Push daily  senna 2 Tablet(s) Oral at bedtime  venlafaxine 37.5 milliGRAM(s) Oral every 12 hours      PHYSICAL EXAM:  T(C): 36.7 (08-11-21 @ 05:36), Max: 36.8 (08-10-21 @ 11:40)  HR: 93 (08-11-21 @ 05:36) (93 - 99)  BP: 161/77 (08-11-21 @ 05:36) (123/67 - 161/77)  RR: 18 (08-11-21 @ 05:36) (18 - 18)  SpO2: 98% (08-11-21 @ 05:36) (98% - 98%)  Wt(kg): --  I&O's Summary    10 Aug 2021 07:01  -  11 Aug 2021 07:00  --------------------------------------------------------  IN: 720 mL / OUT: 500 mL / NET: 220 mL          Appearance: NAD  HEENT:   Dry  oral mucosa, PERRL, EOMI	  Lymphatic: No lymphadenopathy , no edema  Cardiovascular: Normal S1 S2, No JVD,+ murmurs , Peripheral pulses palpable 2+ bilaterally  Respiratory: Decreased bs   Gastrointestinal:  Soft, Non-tender, + BS	  Skin: No rashes, No ecchymoses, No cyanosis, warm to touch  Musculoskeletal: Normal range of motion, normal strength  Psychiatry:  sleepy   Ext: No edema      LABS:    CARDIAC MARKERS:                                3.8    4.38  )-----------( 113      ( 09 Aug 2021 12:17 )             13.2     08-09    140  |  109<H>  |  24<H>  ----------------------------<  262<H>  4.3   |  17<L>  |  1.75<H>    Ca    8.2<L>      09 Aug 2021 12:17  Mg     2.1     08-09      proBNP:   Lipid Profile:   HgA1c:   TSH:     14          TELEMETRY: 	    ECG:  	  RADIOLOGY:   DIAGNOSTIC TESTING:  [ ] Echocardiogram:  [ ]  Catheterization:  [ ] Stress Test:    OTHER:

## 2021-08-11 NOTE — DISCHARGE NOTE PROVIDER - PROVIDER TOKENS
PROVIDER:[TOKEN:[[id number]],FOLLOWUP:[1 week],ESTABLISHEDPATIENT:[T]] PROVIDER:[TOKEN:[[id number]],FOLLOWUP:[1 week],ESTABLISHEDPATIENT:[T]],PROVIDER:[TOKEN:[[id number]]],PROVIDER:[TOKEN:[[id number]]],PROVIDER:[TOKEN:[[id number]]],PROVIDER:[TOKEN:[[id number]]]

## 2021-08-11 NOTE — DISCHARGE NOTE PROVIDER - NSDCCPCAREPLAN_GEN_ALL_CORE_FT
PRINCIPAL DISCHARGE DIAGNOSIS  Diagnosis: Severe anemia  Assessment and Plan of Treatment: Continue present medication regimen.  Follow up with PMD.      SECONDARY DISCHARGE DIAGNOSES  Diagnosis: Hematuria  Assessment and Plan of Treatment: Follow up with your Urologist in 1 week.   Continue to drink plenty of fluids.    Diagnosis: Type 2 diabetes with nephropathy  Assessment and Plan of Treatment: HgA1C this admission.  Make sure you get your HgA1c checked every three months.  If you take oral diabetes medications, check your blood glucose two times a day.  If you take insulin, check your blood glucose before meals and at bedtime.  It's important not to skip any meals.  Keep a log of your blood glucose results and always take it with you to your doctor appointments.  Keep a list of your current medications including injectables and over the counter medications and bring this medication list with you to all your doctor appointments.  If you have not seen your ophthalmologist this year call for appointment.  Check your feet daily for redness, sores, or openings. Do not self treat. If no improvement in two days call your primary care physician for an appointment.  Low blood sugar (hypoglycemia) is a blood sugar below 70mg/dl. Check your blood sugar if you feel signs/symptoms of hypoglycemia. If your blood sugar is below 70 take 15 grams of carbohydrates (ex 4 oz of apple juice, 3-4 glucose tablets, or 4-6 oz of regular soda) wait 15 minutes and repeat blood sugar to make sure it comes up above 70.  If your blood sugar is above 70 and you are due for a meal, have a meal.  If you are not due for a meal have a snack.  This snack helps keeps your blood sugar at a safe range.      Diagnosis: Type 2 diabetes mellitus with peripheral neuropathy  Assessment and Plan of Treatment:      PRINCIPAL DISCHARGE DIAGNOSIS  Diagnosis: Severe anemia  Assessment and Plan of Treatment: - pt is LUCI, does not accept blood products- established by patient per chart review prior to AMS  - received EPO 40, 000 unit 7/28; will hold on giving higher dose therapy (3x/week) as blood sparing treatment due to acute CVA  - s/p IV iron 200mg daily x 9 doses, for 1 further dose today  - continue folic acid, s/p IM B12  - LIMIT blood draws- no need for routine CBC; agree with Pediatric tubes  - s/p Retacrit 10,000 unit 8/4 and 8/11  - last Hg 3.8  - All blood draws should be done in pediatric VILES  - Follow up with PMD and Hematology after discharge from rehab.      SECONDARY DISCHARGE DIAGNOSES  Diagnosis: Hematuria  Assessment and Plan of Treatment: Improved.  Can be followed by Urology at rehab if in line with GOC.  Follow up with Urology within 1 week of discharge from rehab.    Diagnosis: Enterococcal bacteremia  Assessment and Plan of Treatment: Patient was evaluated by Infectious Disease and treated with IV antibiotics, needs another 24 hours of PO antibiotics.  TTE (8/3) was limited and without vegetations but with severe aortic regurgitation.  Recommend surveillance BCx 2 weeks after antibiotic completion in light of the severe AR (and as we likely cannot safely pursue KIM).   --  Please send surveillance blood cultures in 2 weeks if in line with patient's GOC.    Diagnosis: Type 2 diabetes with nephropathy  Assessment and Plan of Treatment: Avoid taking NSAIDs (ex: Ibuprofen, Advil, Celebrex, Naprosyn) and other agents that can harm the kidneys such as intravenous contrast for diagnostic testing, combination cold medications, etc. until you are instructed to do so by your Primary Care Physician.  Have all of your medications adjusted for your renal function by your Health Care Provider.  Blood pressure control is important.  Take all medication as prescribed.  Do not overconsume foods that are high in potassium, such as bananas, until you are instructed to do so by your primary care physician.    Diagnosis: Type 2 diabetes mellitus with peripheral neuropathy  Assessment and Plan of Treatment: Make sure you get your HgA1c checked every three months.  Check your blood glucose before meals and at bedtime.  It's important not to skip any meals.  Keep a log of your blood glucose results and always take it with you to your doctor appointments.  Keep a list of your current medications including over the counter medications and bring this medication list with you to all your doctor appointments.  If you have not seen your ophthalmologist this year, call for appointment.  Check your feet daily for redness, sores, or openings.  Do not self treat.  If there is no improvement in two days, call your primary care physician for an appointment.  HgA1c this admission was 6.5.  ---  Improved.  Can be followed by Endocrinology.  Patient should additionally follow-up with Endocrinology within 1 week of discharge from     Diagnosis: Hypernatremia  Assessment and Plan of Treatment: From DI.  Controlled as of 8/9, with DDAVP (and in setting of patient having access to free water).  If bloodwork repeated, and Na is <140, then can stop DDAVP.  Patient should follow up [facility] within 1 week of discharge from rehab, sooner while at rehab if in line with GOC.

## 2021-08-11 NOTE — PROGRESS NOTE ADULT - ASSESSMENT
71 yo Female Jehovah witness with PMHx of breast CA s/p BL mastectomy with recurrence and metastasis here with c/o vaginal bleeding, found to have bladder hematoma on pelvic ultrasound. Pt also with R sided nephU with mild hydro, but pt unsure indication for nephU.   on admission: H/H 4.3/13.5, SCr 1.37.  Pt follows up at [facility] and is currently receiving chemotherapy.     7/28: Hct 11.2 from 13.5 yesterday. ptn is pale, lethargic, dyspneic, expresses wishes not to have any blood products based on her GD DOV. ptn placed her proxy on the phone Mr. Gray who wanted to confirm the hospital is respecting ptn's wishes. both ptn and the proxy aksed baout being transferred to [facility]. however ptn is not stable w HGB of 3 to be travelling. denies CP, palps, HA, has ongoing hematuria, CBI in place. Ct C/A/P reviewed. ptn w metastatic dz process on CT scan. Heme consult called. will cont CBI as per . pending CT urogram. cont trending HH. started on IV IRON. prognosis is guarded.   7/29: ptn found unresponsive with a droop, fever, ongoing hematuria, had filled out a MOLST form , she is DNR/DNI, code stroke called, will give Abx, keep temp down w cooling blanket, since ptn cannot receive anticoagulation/is dnr/dni will not get CTA of brain, will place on KEPPRA for sz, get eeg, prognosis is grave. this was d/w ,Mr Gray ptn's proxy. MICU consult reviewed. prognosis is grave  7/30:   ptn is morwe responsive today though minimally, has enterococcal bacteremia. seen by ID , placed on IV AMPICILLIN while awaiting sensitivities. rpt blood cx sent. will check HH in am. all blood draws should be done in pediatric VILES . GLEN, ongoing 2/2 ischemic ATN, hematuria improving  7/31:  little more responsive today, has hypernatremia, will start free water via NGT. has clots in CBI. rpt hgb3.6, ptn is hyperglycemic. will call endo  8/1: ptn is lethargic, hematuria has improved, now off  CBI, ongoing GLEN and hypernatremia though improved, heme, renal  following. Hyperglycemia, will dc d5W drip, place on standing LANTUS , cont Admelog on a sliding scale, Dr. Guthrie from [facility] called. increase free water intake to 250 q4H per NGT. rpt cbc and BMP in am via pediatric tubes. tranexamic acid, EPO, IV iron, folic acid and B12 as per heme  8/2: awake, alert, responds appropriately. on and off confusion, diaz in place w hematuria, no clots. off CBI.  ptn refused Blood work today. on NGT feeds w free water per NGT, hyperglycemia is ongoing, not controlled, endo on board. will DC IVF  8/3: ptn more awake and alert. on and off confusion. HH improving. heme following, diaz w pink urine. hypernatremia worsening despite FREE water via NGT  8/4: pulled NGT. will need to replace. ptn had filled out a MOLST form prior to arrival. no artifical feeds. remain NPO 2/2 failed swallow study. check HH and Na tomorrow. on IVF. getting procrit today. has pink urine in diaz  8/5: passed S&S, placed on a soft diet, has DI as per renal, placed on DDAVP, hence persistent hypernatremia. on IV ABx for bacteremia, rpt blood cx neg. Diaz in place, pink clear urine. HH is still quite low, on IV iron and procrit.   8/6: dc diaz, TOV as per urology. ABx as per ID. ptn wants to go home. check HH tomorrow. will need home w hospice. once ptn is on hospice i dont think PT is cont, will need to check.  will inquire about SRINI  8/7, 8/8: ptn states she feels fine. wants to go home. GLEN is improving. on DDAVP for DI post CVA. chronic hematuria. passed TOV. glucose still above goal but improving. hospice consult placed  8/9: ptn is AxOx3, wants to go home. she is DNR, she is tolerating a soft diet. she is severely anemic, she refuses transfusions, she has been receiving IV Iron and procrit. she has a chronic hematuria for which no intervention is being offered. she had a CVA, has a resolving right facial droop. ptn is referred to home hospice consult before she leaves  8/10: AxOx3, on IV Abx prob through 8/14 for enterococcal bacteremia 7/29, on 7/31 blood cx neg. has chronic gross hematuria, no clots, HH is severely low, refuses transfusions 2/2 Jain believes. has metastatic breast Ca. awaiting home hospice consult.   8/11: ptn spoke w Hospice consult, asked for HCP to be involved in decision making as well. wants to go to [facility] where she resides. has severe anemia. refusing transfusions 2/2 Jain believes. will finish 2 weeks IV abx for bacteremia on 8/14

## 2021-08-11 NOTE — DISCHARGE NOTE PROVIDER - CARE PROVIDER_API CALL
SNEHA HODGE  Urology  [address] [place] [address]  Phone: [phone]  Fax: [phone]  Established Patient  Follow Up Time: 1 week   SNEHA HODGE  Urology  [address] [facility] [address]  [place] [address]  Phone: [phone]  Fax: [phone]  Established Patient  Follow Up Time: 1 week    Ayde Patrick)  EndocrinologyMetabDiabetes; Internal Medicine  [address] [facility], [address]  [place] [address]  Phone: [phone]  Fax: [phone]  Follow Up Time:     Sneha Nixon)  Internal Medicine; Nephrology  [address] [facility] [facility] [address]  [place] [address]  Phone: [phone]  Fax: [phone]  Follow Up Time:     Stefania Padilla)  Hematology; Medical Oncology  [address]  [facility], [address]  [place] [address]  Phone: [phone]  Fax: [phone]  Follow Up Time:     Eric Stewart)  Infectious Disease; Internal Medicine  [address] [place] [address]  Phone: [phone]  Fax: [phone]  Follow Up Time:

## 2021-08-11 NOTE — DISCHARGE NOTE PROVIDER - HOSPITAL COURSE
To be done. 69 yo Female Jehovah witness with PMHx of breast CA s/p BL mastectomy with recurrence and metastasis here with c/o vaginal bleeding, found to have bladder hematoma on pelvic ultrasound. Pt also with R sided nephU with mild hydro, but pt unsure indication for nephU.   on admission: H/H 4.3/13.5, SCr 1.37.  Pt follows up at [facility] and is currently receiving chemotherapy.     7/28: Hct 11.2 from 13.5 yesterday. ptn is pale, lethargic, dyspneic, expresses wishes not to have any blood products based on her GD DOV. ptn placed her proxy on the phone Mr. Gray who wanted to confirm the hospital is respecting ptn's wishes. both ptn and the proxy aksed baout being transferred to [facility]. however ptn is not stable w HGB of 3 to be travelling. denies CP, palps, HA, has ongoing hematuria, CBI in place. Ct C/A/P reviewed. ptn w metastatic dz process on CT scan. Heme consult called. will cont CBI as per . pending CT urogram. cont trending HH. started on IV IRON. prognosis is guarded.   7/29: ptn found unresponsive with a droop, fever, ongoing hematuria, had filled out a MOLST form , she is DNR/DNI, code stroke called, will give Abx, keep temp down w cooling blanket, since ptn cannot receive anticoagulation/is dnr/dni will not get CTA of brain, will place on KEPPRA for sz, get eeg, prognosis is grave. this was d/w ,Mr Gray ptn's proxy. MICU consult reviewed. prognosis is grave  7/30:   ptn is morwe responsive today though minimally, has enterococcal bacteremia. seen by ID , placed on IV AMPICILLIN while awaiting sensitivities. rpt blood cx sent. will check HH in am. all blood draws should be done in pediatric [facility] . GLEN, ongoing 2/2 ischemic ATN, hematuria improving  7/31:  little more responsive today, has hypernatremia, will start free water via NGT. has clots in CBI. rpt hgb3.6, ptn is hyperglycemic. will call endo  8/1: ptn is lethargic, hematuria has improved, now off  CBI, ongoing GLEN and hypernatremia though improved, heme, renal  following. Hyperglycemia, will dc d5W drip, place on standing LANTUS , cont Admelog on a sliding scale, Dr. Guthrie from [facility] called. increase free water intake to 250 q4H per NGT. rpt cbc and BMP in am via pediatric tubes. tranexamic acid, EPO, IV iron, folic acid and B12 as per heme  8/2: awake, alert, responds appropriately. on and off confusion, diaz in place w hematuria, no clots. off CBI.  ptn refused Blood work today. on NGT feeds w free water per NGT, hyperglycemia is ongoing, not controlled, endo on board. will DC IVF  8/3: ptn more awake and alert. on and off confusion. HH improving. heme following, diaz w pink urine. hypernatremia worsening despite FREE water via NGT  8/4: pulled NGT. will need to replace. ptn had filled out a MOLST form prior to arrival. no artifical feeds. remain NPO 2/2 failed swallow study. check HH and Na tomorrow. on IVF. getting procrit today. has pink urine in diaz  8/5: passed S&S, placed on a soft diet, has DI as per renal, placed on DDAVP, hence persistent hypernatremia. on IV ABx for bacteremia, rpt blood cx neg. Diaz in place, pink clear urine. HH is still quite low, on IV iron and procrit.   8/6: dc diaz, TOV as per urology. ABx as per ID. ptn wants to go home. check HH tomorrow. will need home w hospice. once ptn is on hospice i dont think PT is cont, will need to check.  will inquire about [facility]  8/7, 8/8: ptn states she feels fine. wants to go home. GLEN is improving. on DDAVP for DI post CVA. chronic hematuria. passed TOV. glucose still above goal but improving. hospice consult placed  8/9: ptn is AxOx3, wants to go home. she is DNR, she is tolerating a soft diet. she is severely anemic, she refuses transfusions, she has been receiving IV Iron and procrit. she has a chronic hematuria for which no intervention is being offered. she had a CVA, has a resolving right facial droop. ptn is referred to home hospice consult before she leaves  8/10: AxOx3, on IV Abx prob through 8/14 for enterococcal bacteremia 7/29, on 7/31 blood cx neg. has chronic gross hematuria, no clots, HH is severely low, refuses transfusions 2/2 [employer] believes. has metastatic breast Ca. awaiting home hospice consult.   8/11: ptn spoke w Hospice consult, asked for HCP to be involved in decision making as well. wants to go to [facility] where she resides. has severe anemia. refusing transfusions 2/2 [employer] believes. will finish 2 weeks IV abx for bacteremia on 8/14 8/12: rejected to be transferred to assisted living 2/2 severity of underlying illness. will be going to [facility] post completing on IV Abx

## 2021-08-11 NOTE — DISCHARGE NOTE PROVIDER - NSDCMRMEDTOKEN_GEN_ALL_CORE_FT
brimonidine 0.2% ophthalmic solution: 1 drop(s) in each eye 2 times a day  HumaLOG 100 units/mL subcutaneous solution: 4 unit(s) subcutaneous 3 times a day (before meals) as per blood sugar  Janumet 50 mg-1000 mg oral tablet: 0.5 tab(s) orally 2 times a day  Lumigan 0.01% ophthalmic solution: 1 drop(s) in each eye once a day (at bedtime)  midodrine 10 mg oral tablet: 1 tab(s) orally 3 times a day  MiraLax oral powder for reconstitution: 17 gram(s) orally once a day, As Needed  mirtazapine 15 mg oral tablet: 1 tab(s) orally once a day (at bedtime)  ondansetron 4 mg oral tablet: 1 tab(s) orally every 8 hours, As Needed  pantoprazole 40 mg oral delayed release tablet: 1 tab(s) orally 2 times a day  Refresh ophthalmic solution: 1 drop(s) in each eye 4 times a day, As Needed  sucralfate 1 g oral tablet: 1 tab(s) orally 3 times a day  Tylenol 325 mg oral tablet: 2 tab(s) orally every 6 hours, As Needed  venlafaxine 75 mg oral capsule, extended release: 3 cap(s) orally once a day   amoxicillin 500 mg oral tablet: 1 tab(s) orally every 12 hours. stop after 2 doses  brimonidine 0.2% ophthalmic solution: 1 drop(s) in each eye 2 times a day  desmopressin 0.1 mg oral tablet: 1 tab(s) orally once a day  folic acid 1 mg oral tablet: 1 tab(s) orally once a day  insulin glargine: 10 unit(s) subcutaneous once a day (at bedtime)  insulin lispro 100 units/mL injectable solution: 8  subcutaneous 3 times a day (before meals)  levETIRAcetam 500 mg oral tablet: 1 tab(s) orally 2 times a day  Lumigan 0.01% ophthalmic solution: 1 drop(s) in each eye once a day (at bedtime)  MiraLax oral powder for reconstitution: 17 gram(s) orally once a day, As Needed  mirtazapine 15 mg oral tablet: 1 tab(s) orally once a day (at bedtime)  pantoprazole 40 mg oral delayed release tablet: 1 tab(s) orally 2 times a day  Refresh ophthalmic solution: 1 drop(s) in each eye 4 times a day, As Needed  senna oral tablet: 2 tab(s) orally once a day (at bedtime)  Tylenol 325 mg oral tablet: 2 tab(s) orally every 6 hours, As Needed  venlafaxine 37.5 mg oral tablet: 1 tab(s) orally every 12 hours

## 2021-08-11 NOTE — PROGRESS NOTE ADULT - SUBJECTIVE AND OBJECTIVE BOX
Overnight events noted      VITAL:  T(C): , Max: 36.8 (08-10-21 @ 11:40)  T(F): , Max: 98.2 (08-10-21 @ 11:40)  HR: 93 (08-11-21 @ 05:36)  BP: 161/77 (08-11-21 @ 05:36)  RR: 18 (08-11-21 @ 05:36)  SpO2: 98% (08-11-21 @ 05:36)      PHYSICAL EXAM:  Constitutional: alert; NAD  HEENT: DMM, no NGT  Neck: Supple, No JVD  Respiratory: CTA-b/l  Cardiovascular: RRR s1s2  Gastrointestinal: BS+, soft, NT/ND  : (+)primafit  Extremities: No peripheral edema b/l  Neurological: no focal deficits; reduced generalized strength  Back: no CVAT b/l  Skin: No rashes, no nevi    LABS:                        3.8    4.38  )-----------( 113      ( 09 Aug 2021 12:17 )             13.2     Na(140)/K(4.3)/Cl(109)/HCO3(17)/BUN(24)/Cr(1.75)Glu(262)/Ca(8.2)/Mg(2.1)/PO4(--)    08-09 @ 12:17      IMPRESSION: 70F, Evangel[employer], with breast CA, 7/27/21 p/w vaginal bleeding/severe anemia    (1)Renal - CKD/resolved superimposed ischemic ATN    (2)Hypernatremia - DI - now controlled, with DDAVP (and in setting of patient having access to free water)    (3)ID - enterococcal bacteremia - on IV Ampicillin    (4)Anemia - severe - in setting of being a Evangelical. Iron deficiency; receiving frequent doses of IV iron; s/p Retacrit 8/4 and 8/11 (today)      RECOMMEND:  (1)Meds as ordered/dose new meds for GFR 40-50ml/min            David Nixon MD  [facility]  Office: [phone]  Cell: [phone]          No pain, no sob   VITAL: T(C): , Max: 36.8 (08-10-21 @ 11:40) T(F): , Max: 98.2 (08-10-21 @ 11:40) HR: 93 (08-11-21 @ 05:36) BP: 161/77 (08-11-21 @ 05:36) RR: 18 (08-11-21 @ 05:36) SpO2: 98% (08-11-21 @ 05:36)   PHYSICAL EXAM: Constitutional: alert; NAD HEENT: NCAT, DMM Neck: Supple, No JVD Respiratory: CTA-b/l Cardiovascular: RRR s1s2 Gastrointestinal: BS+, soft, NT/ND : (+)primafit Extremities: No peripheral edema b/l Neurological: no focal deficits; reduced generalized strength Back: no CVAT b/l Skin: No rashes, no nevi  LABS:                      3.8   4.38  )-----------( 113      ( 09 Aug 2021 12:17 )            13.2   Na(140)/K(4.3)/Cl(109)/HCO3(17)/BUN(24)/Cr(1.75)Glu(262)/Ca(8.2)/Mg(2.1)/PO4(--)    08-09 @ 12:17   IMPRESSION: 70F, U[employer], with breast CA, 7/27/21 p/w vaginal bleeding/severe anemia  (1)Renal - CKD/resolved superimposed ischemic ATN  (2)Hypernatremia - DI - now controlled, with DDAVP (and in setting of patient having access to free water)  (3)ID - enterococcal bacteremia - on IV Ampicillin  (4)Anemia - severe - in setting of being a Uats[employer]. Iron deficiency; receiving frequent doses of IV iron; s/p Retacrit 8/4 and 8/11 (today)   RECOMMEND: (1)Meds as ordered/dose new meds for GFR 40-50ml/min      David Nixon MD [facility] Office: [phone] Cell: [phone]

## 2021-08-11 NOTE — PROGRESS NOTE ADULT - ASSESSMENT
71 y/o F, Shinto, w/h/o T2DM> HbA1c 6.5% (Skewed due to severe anemia) . DM c/b neuropath, CKD. Also h/o breast Ca on chemo (unsure of name) anemia, HTN, HLD. Here with severe anemia, acute blood loss 2/2 hematuria> unable to receive blood. Consult called for management of hyperglycemia. Tolerating POs w/BG values above goal ac meals but FBG coming down <100s. Will continue to adjust insulin to BG goal (100-180mg/dl). Pt is DNR.

## 2021-08-11 NOTE — DISCHARGE NOTE PROVIDER - CARE PROVIDERS DIRECT ADDRESSES
,DirectAddress_Unknown ,DirectAddress_Unknown,[email or web address],DirectAddress_Unknown,[email or web address]

## 2021-08-11 NOTE — DISCHARGE NOTE PROVIDER - NSDCDCMDCOMP_GEN_ALL_CORE
nifedipine held this AM due to mild hypotension  diuresis yesterday    - transplant ok with removing the cordis access  - to cont protonix for now  - lasix 40 mg IV  - will discuss with transplant regarding a podiatry consult given no healing heel ulcer  - PT OT to work with patient  - transplant is considering removing one ANDREINA  - held tacro dose per transplant given having tremors, if level normal, then may continue This document is complete and the patient is ready for discharge.

## 2021-08-11 NOTE — PROGRESS NOTE ADULT - PROBLEM SELECTOR PLAN 1
-test BG AC/HS. Check BG at 2am tonight  -Decrease Lantus to 9 units QHS.  -C/w Admelog 10 units AC meals for now> will increase to 11 units of prandial BG >200s  -c/w Admelog low correction scale AC and Low HS scale  Discharge plan:  TBD based on GFR. Noted GFR <30 so pt can'T be on Metformin any more. Plan to discharge on basal insulin plus Orals. Doses TBD  -Plan discussed with pt/team.  Contact info: [phone] (24/7). pager [phone]

## 2021-08-11 NOTE — PROGRESS NOTE ADULT - ASSESSMENT
69 yo F Evangelical, hx of breast Ca on active chemo (unsure of name) and anemia p/w reported vaginal bleeding. Recent admission to [facility] for the same, underwent EPO treatment and discharged. Reported Hgb of 5 during admission. Feeling generally weak, denies chest pain sob. Reports bright red blood in the toilet when she urinates. Denies dysuria, abdominal pain. Patient is unable to receive blood products of any kind.  has been tachycardic.

## 2021-08-11 NOTE — PROGRESS NOTE ADULT - SUBJECTIVE AND OBJECTIVE BOX
Patient is a 70y old  Female who presents with a chief complaint of severe anemia, acute blood loss 2/2 hematuria (11 Aug 2021 12:58)      SUBJECTIVE / OVERNIGHT EVENTS: ptn spoke w Hospice consult, asked for HCP to be involved in decision making as well. wants to go to [facility] she [facility]. has severe anemia. refusing transfusions 2/2 [employer] believes.     MEDICATIONS  (STANDING):  ampicillin  IVPB 2 Gram(s) IV Intermittent every 6 hours  brimonidine 0.2% Ophthalmic Solution 1 Drop(s) Both EYES two times a day  desmopressin 0.1 milliGRAM(s) Oral daily  dextrose 40% Gel 15 Gram(s) Oral once  dextrose 5%. 1000 milliLiter(s) (50 mL/Hr) IV Continuous <Continuous>  dextrose 5%. 1000 milliLiter(s) (100 mL/Hr) IV Continuous <Continuous>  dextrose 50% Injectable 25 Gram(s) IV Push once  dextrose 50% Injectable 12.5 Gram(s) IV Push once  dextrose 50% Injectable 25 Gram(s) IV Push once  folic acid 1 milliGRAM(s) Enteral Tube daily  glucagon  Injectable 1 milliGRAM(s) IntraMuscular once  insulin glargine Injectable (LANTUS) 9 Unit(s) SubCutaneous at bedtime  insulin lispro (ADMELOG) corrective regimen sliding scale   SubCutaneous three times a day before meals  insulin lispro (ADMELOG) corrective regimen sliding scale   SubCutaneous at bedtime  insulin lispro Injectable (ADMELOG) 10 Unit(s) SubCutaneous three times a day with meals  latanoprost 0.005% Ophthalmic Solution 1 Drop(s) Both EYES at bedtime  levETIRAcetam  IVPB 500 milliGRAM(s) IV Intermittent every 12 hours  mirtazapine 15 milliGRAM(s) Oral at bedtime  pantoprazole  Injectable 40 milliGRAM(s) IV Push daily  senna 2 Tablet(s) Oral at bedtime  venlafaxine 37.5 milliGRAM(s) Oral every 12 hours    MEDICATIONS  (PRN):  acetaminophen   Tablet .. 650 milliGRAM(s) Oral every 6 hours PRN Temp greater or equal to 38C (100.4F), Mild Pain (1 - 3)  artificial  tears Solution 1 Drop(s) Both EYES four times a day PRN Dry Eyes      Vital Signs Last 24 Hrs  T(F): 97.6 (08-11-21 @ 11:31), Max: 98.1 (08-11-21 @ 05:36)  HR: 92 (08-11-21 @ 11:31) (92 - 93)  BP: 143/72 (08-11-21 @ 11:31) (123/67 - 161/77)  RR: 18 (08-11-21 @ 11:31) (18 - 18)  SpO2: 98% (08-11-21 @ 11:31) (98% - 98%)  Telemetry:   CAPILLARY BLOOD GLUCOSE      POCT Blood Glucose.: 141 mg/dL (11 Aug 2021 16:59)  POCT Blood Glucose.: 98 mg/dL (11 Aug 2021 12:56)  POCT Blood Glucose.: 69 mg/dL (11 Aug 2021 12:31)  POCT Blood Glucose.: 81 mg/dL (11 Aug 2021 09:02)  POCT Blood Glucose.: 120 mg/dL (11 Aug 2021 02:03)  POCT Blood Glucose.: 179 mg/dL (10 Aug 2021 21:33)    I&O's Summary    10 Aug 2021 07:01  -  11 Aug 2021 07:00  --------------------------------------------------------  IN: 720 mL / OUT: 500 mL / NET: 220 mL    11 Aug 2021 07:01  -  11 Aug 2021 17:43  --------------------------------------------------------  IN: 600 mL / OUT: 300 mL / NET: 300 mL        PHYSICAL EXAM:  GENERAL: NAD, well-developed  HEAD:  Atraumatic, Normocephalic  EYES: EOMI, PERRLA, conjunctiva and sclera clear  NECK: Supple, No JVD  CHEST/LUNG: Clear to auscultation bilaterally; No wheeze  HEART: Regular rate and rhythm; No murmurs, rubs, or gallops  ABDOMEN: Soft, Nontender, Nondistended; Bowel sounds present  EXTREMITIES:  2+ Peripheral Pulses, No clubbing, cyanosis, or edema  PSYCH: AAOx3  NEUROLOGY: non-focal  SKIN: No rashes or lesions    LABS:                    RADIOLOGY & ADDITIONAL TESTS:    Imaging Personally Reviewed:    Consultant(s) Notes Reviewed:      Care Discussed with Consultants/Other Providers:

## 2021-08-11 NOTE — DISCHARGE NOTE PROVIDER - DETAILS OF MALNUTRITION DIAGNOSIS/DIAGNOSES
This patient has been assessed with a concern for Malnutrition and was treated during this hospitalization for the following Nutrition diagnosis/diagnoses:     -  08/01/2021: Moderate protein-calorie malnutrition

## 2021-08-11 NOTE — PROGRESS NOTE ADULT - SUBJECTIVE AND OBJECTIVE BOX
DIABETES FOLLOW UP NOTE: Saw pt earlier today  INTERVAL HX: Pt stable, reports eating well> 100 % of meals. Remains with prandial hyperglycemia but noted FBG <100s this am while on present Lantus dose. Denies any pain. On antibiotic for bacteremia.       Review of Systems:  General: As above  Cardiovascular: No chest pain, palpitations  Respiratory: No SOB, no cough  GI: No nausea, vomiting, abdominal pain  Endocrine: no polyuria, polydipsia or S&Sx of hypoglycemia    Allergies    No Known Allergies    Intolerances      MEDICATIONS:  ampicillin  IVPB 2 Gram(s) IV Intermittent every 6 hours  insulin glargine Injectable (LANTUS) 12 Unit(s) SubCutaneous at bedtime  insulin lispro (ADMELOG) corrective regimen sliding scale   SubCutaneous three times a day before meals  insulin lispro (ADMELOG) corrective regimen sliding scale   SubCutaneous at bedtime  insulin lispro Injectable (ADMELOG) 10 Unit(s) SubCutaneous three times a day with meals      PHYSICAL EXAM:  VITALS: T(C): 36.4 (08-11-21 @ 11:31)  T(F): 97.6 (08-11-21 @ 11:31), Max: 98.1 (08-11-21 @ 05:36)  HR: 92 (08-11-21 @ 11:31) (92 - 93)  BP: 143/72 (08-11-21 @ 11:31) (123/67 - 161/77)  RR:  (18 - 18)  SpO2:  (98% - 98%)  Wt(kg): --  GENERAL: Female laying in bed in NAD  Abdomen: Soft, nontender, non distended  Extremities: Warm, no edema in all 4 exts  NEURO: Alert and able to answer questions.    LABS:  POCT Blood Glucose.: 81 mg/dL (08-11-21 @ 09:02)  POCT Blood Glucose.: 120 mg/dL (08-11-21 @ 02:03)  POCT Blood Glucose.: 179 mg/dL (08-10-21 @ 21:33)  POCT Blood Glucose.: 350 mg/dL (08-10-21 @ 17:11)  POCT Blood Glucose.: 307 mg/dL (08-10-21 @ 12:38)  POCT Blood Glucose.: 198 mg/dL (08-10-21 @ 08:51)  POCT Blood Glucose.: 157 mg/dL (08-10-21 @ 02:07)  POCT Blood Glucose.: 113 mg/dL (08-09-21 @ 21:39)  POCT Blood Glucose.: 163 mg/dL (08-09-21 @ 17:54)  POCT Blood Glucose.: 265 mg/dL (08-09-21 @ 12:44)  POCT Blood Glucose.: 258 mg/dL (08-09-21 @ 08:49)  POCT Blood Glucose.: 106 mg/dL (08-08-21 @ 21:33)  POCT Blood Glucose.: 147 mg/dL (08-08-21 @ 18:11)  POCT Blood Glucose.: 217 mg/dL (08-08-21 @ 13:57)  POCT Blood Glucose.: 216 mg/dL (08-08-21 @ 12:32)                            3.8    4.38  )-----------( 113      ( 09 Aug 2021 12:17 )             13.2       08-09    140  |  109<H>  |  24<H>  ----------------------------<  262<H>  4.3   |  17<L>  |  1.75<H>    EGFR if non : 29<L>    Ca    8.2<L>      08-09  Mg     2.1     08-09        Thyroid Function Tests:  07-29 @ 19:56 TSH 2.45 FreeT4 -- T3 -- Anti TPO -- Anti Thyroglobulin Ab -- TSI --      A1C with Estimated Average Glucose Result: 6.5 % (07-28-21 @ 09:21)      Estimated Average Glucose: 140 mg/dL (07-28-21 @ 09:21)        08-05 Chol 154 Direct LDL -- LDL calculated 93 HDL 28<L> Trig 165<H>

## 2021-08-11 NOTE — DISCHARGE NOTE PROVIDER - NSDCFUADDINST_GEN_ALL_CORE_FT
- Limit blood draws - use pediatric vials if possible.  - Care to be guided by the patient's wishes.  - Follow up with your Primary Care Doctor, Hematologist/Oncologist, Nephrologist, Urologist, and Endocrinologist after discharge from rehab.  - 24 hours of PO amoxicillin, then discontinue.  The Infectious Disease Specialist recommends surveillance blood cultures 2 weeks after completion of antibiotics.

## 2021-08-12 LAB
GLUCOSE BLDC GLUCOMTR-MCNC: 130 MG/DL — HIGH (ref 70–99)
GLUCOSE BLDC GLUCOMTR-MCNC: 155 MG/DL — HIGH (ref 70–99)
GLUCOSE BLDC GLUCOMTR-MCNC: 192 MG/DL — HIGH (ref 70–99)
GLUCOSE BLDC GLUCOMTR-MCNC: 204 MG/DL — HIGH (ref 70–99)
SARS-COV-2 RNA SPEC QL NAA+PROBE: SIGNIFICANT CHANGE UP

## 2021-08-12 PROCEDURE — 99232 SBSQ HOSP IP/OBS MODERATE 35: CPT

## 2021-08-12 RX ORDER — INSULIN GLARGINE 100 [IU]/ML
10 INJECTION, SOLUTION SUBCUTANEOUS AT BEDTIME
Refills: 0 | Status: DISCONTINUED | OUTPATIENT
Start: 2021-08-12 | End: 2021-08-13

## 2021-08-12 RX ADMIN — INSULIN GLARGINE 10 UNIT(S): 100 INJECTION, SOLUTION SUBCUTANEOUS at 21:54

## 2021-08-12 RX ADMIN — BRIMONIDINE TARTRATE 1 DROP(S): 2 SOLUTION/ DROPS OPHTHALMIC at 06:19

## 2021-08-12 RX ADMIN — Medication 8 UNIT(S): at 13:10

## 2021-08-12 RX ADMIN — Medication 37.5 MILLIGRAM(S): at 06:19

## 2021-08-12 RX ADMIN — Medication 216 GRAM(S): at 06:19

## 2021-08-12 RX ADMIN — DESMOPRESSIN ACETATE 0.1 MILLIGRAM(S): 0.1 TABLET ORAL at 12:12

## 2021-08-12 RX ADMIN — Medication 216 GRAM(S): at 17:45

## 2021-08-12 RX ADMIN — PANTOPRAZOLE SODIUM 40 MILLIGRAM(S): 20 TABLET, DELAYED RELEASE ORAL at 12:12

## 2021-08-12 RX ADMIN — Medication 1: at 13:09

## 2021-08-12 RX ADMIN — Medication 1: at 18:25

## 2021-08-12 RX ADMIN — Medication 8 UNIT(S): at 18:26

## 2021-08-12 RX ADMIN — LEVETIRACETAM 400 MILLIGRAM(S): 250 TABLET, FILM COATED ORAL at 21:52

## 2021-08-12 RX ADMIN — LATANOPROST 1 DROP(S): 0.05 SOLUTION/ DROPS OPHTHALMIC; TOPICAL at 21:52

## 2021-08-12 RX ADMIN — MIRTAZAPINE 15 MILLIGRAM(S): 45 TABLET, ORALLY DISINTEGRATING ORAL at 21:51

## 2021-08-12 RX ADMIN — Medication 37.5 MILLIGRAM(S): at 17:48

## 2021-08-12 RX ADMIN — LEVETIRACETAM 400 MILLIGRAM(S): 250 TABLET, FILM COATED ORAL at 09:40

## 2021-08-12 RX ADMIN — Medication 1 MILLIGRAM(S): at 12:13

## 2021-08-12 RX ADMIN — Medication 8 UNIT(S): at 09:38

## 2021-08-12 RX ADMIN — BRIMONIDINE TARTRATE 1 DROP(S): 2 SOLUTION/ DROPS OPHTHALMIC at 17:47

## 2021-08-12 RX ADMIN — Medication 2: at 09:38

## 2021-08-12 RX ADMIN — Medication 216 GRAM(S): at 12:12

## 2021-08-12 NOTE — PROGRESS NOTE ADULT - ASSESSMENT
71 y/o F, Adventism, w/h/o T2DM> HbA1c 6.5% (Skewed due to severe anemia) . DM c/b neuropath, CKD. Also h/o breast Ca on chemo (unsure of name) anemia, HTN, HLD. Here with severe anemia, acute blood loss 2/2 hematuria> unable to receive blood. Consult called for management of hyperglycemia. Hypoglycemia yesterday between breakfast and lunch due to inconsistent PO intake. Will trend glucose values to make further adjustments. Can liberalize diet to expand pt choices if pt not getting enough calories. No need for tight glycemic control given poor prognosis.

## 2021-08-12 NOTE — PROGRESS NOTE ADULT - SUBJECTIVE AND OBJECTIVE BOX
Diabetes Follow up note:    Chief complaint: T2DM    Interval Hx: Pt w/hypoglycemia yesterday prior to lunch. BG values 140s-low 200s since that time. Pt seen at bedside. Reports fair appetite that varies from meal to meal. This AM only ate about 1/3rd of breakfast.  Reports feeling fatigued and weak.     Review of Systems:  General: denies pain  GI: Tolerating POs. Denies N/V/D/Abd pain  CV: Denies CP/SOB  ENDO: No S&Sx of hypoglycemia  MEDS:  desmopressin 0.1 milliGRAM(s) Oral daily    insulin glargine Injectable (LANTUS) 9 Unit(s) SubCutaneous at bedtime  insulin lispro (ADMELOG) corrective regimen sliding scale   SubCutaneous three times a day before meals  insulin lispro (ADMELOG) corrective regimen sliding scale   SubCutaneous at bedtime  insulin lispro Injectable (ADMELOG) 8 Unit(s) SubCutaneous three times a day with meals    ampicillin  IVPB 2 Gram(s) IV Intermittent every 6 hours    Allergies    No Known Allergies      PE:  General: Female lying in bed. NAD.   Vital Signs Last 24 Hrs  T(C): 36.8 (12 Aug 2021 04:48), Max: 36.8 (12 Aug 2021 04:48)  T(F): 98.2 (12 Aug 2021 04:48), Max: 98.2 (12 Aug 2021 04:48)  HR: 97 (12 Aug 2021 04:48) (92 - 98)  BP: 139/71 (12 Aug 2021 04:48) (139/71 - 147/73)  BP(mean): --  RR: 18 (12 Aug 2021 04:48) (17 - 18)  SpO2: 97% (12 Aug 2021 04:48) (97% - 98%)  Abd: Soft, NT,ND,   Extremities: Warm  Neuro: A&O X3    LABS:  POCT Blood Glucose.: 204 mg/dL (08-12-21 @ 09:11)  POCT Blood Glucose.: 237 mg/dL (08-11-21 @ 21:03)  POCT Blood Glucose.: 141 mg/dL (08-11-21 @ 16:59)  POCT Blood Glucose.: 98 mg/dL (08-11-21 @ 12:56)  POCT Blood Glucose.: 69 mg/dL (08-11-21 @ 12:31)  POCT Blood Glucose.: 81 mg/dL (08-11-21 @ 09:02)  POCT Blood Glucose.: 120 mg/dL (08-11-21 @ 02:03)  POCT Blood Glucose.: 179 mg/dL (08-10-21 @ 21:33)  POCT Blood Glucose.: 350 mg/dL (08-10-21 @ 17:11)  POCT Blood Glucose.: 307 mg/dL (08-10-21 @ 12:38)  POCT Blood Glucose.: 198 mg/dL (08-10-21 @ 08:51)  POCT Blood Glucose.: 157 mg/dL (08-10-21 @ 02:07)  POCT Blood Glucose.: 113 mg/dL (08-09-21 @ 21:39)  POCT Blood Glucose.: 163 mg/dL (08-09-21 @ 17:54)  POCT Blood Glucose.: 265 mg/dL (08-09-21 @ 12:44)                  Thyroid Function Tests:  07-29 @ 19:56 TSH 2.45 FreeT4 -- T3 -- Anti TPO -- Anti Thyroglobulin Ab -- TSI --      A1C with Estimated Average Glucose Result: 6.5 % (07-28-21 @ 09:21)          Contact number: martin [phone] or [phone]

## 2021-08-12 NOTE — PROGRESS NOTE ADULT - ASSESSMENT
71 yo Female Jehovah witness with PMHx of breast CA s/p BL mastectomy with recurrence and metastasis here with c/o vaginal bleeding, found to have bladder hematoma on pelvic ultrasound. Pt also with R sided nephU with mild hydro, but pt unsure indication for nephU.   on admission: H/H 4.3/13.5, SCr 1.37.  Pt follows up at [facility] and is currently receiving chemotherapy.     7/28: Hct 11.2 from 13.5 yesterday. ptn is pale, lethargic, dyspneic, expresses wishes not to have any blood products based on her GD DOV. ptn placed her proxy on the phone Mr. Gray who wanted to confirm the hospital is respecting ptn's wishes. both ptn and the proxy aksed baout being transferred to [facility]. however ptn is not stable w HGB of 3 to be travelling. denies CP, palps, HA, has ongoing hematuria, CBI in place. Ct C/A/P reviewed. ptn w metastatic dz process on CT scan. Heme consult called. will cont CBI as per . pending CT urogram. cont trending HH. started on IV IRON. prognosis is guarded.   7/29: ptn found unresponsive with a droop, fever, ongoing hematuria, had filled out a MOLST form , she is DNR/DNI, code stroke called, will give Abx, keep temp down w cooling blanket, since ptn cannot receive anticoagulation/is dnr/dni will not get CTA of brain, will place on KEPPRA for sz, get eeg, prognosis is grave. this was d/w ,Mr Gray ptn's proxy. MICU consult reviewed. prognosis is grave  7/30:   ptn is morwe responsive today though minimally, has enterococcal bacteremia. seen by ID , placed on IV AMPICILLIN while awaiting sensitivities. rpt blood cx sent. will check HH in am. all blood draws should be done in pediatric [facility] . GLEN, ongoing 2/2 ischemic ATN, hematuria improving  7/31:  little more responsive today, has hypernatremia, will start free water via NGT. has clots in CBI. rpt hgb3.6, ptn is hyperglycemic. will call endo  8/1: ptn is lethargic, hematuria has improved, now off  CBI, ongoing GLEN and hypernatremia though improved, heme, renal  following. Hyperglycemia, will dc d5W drip, place on standing LANTUS , cont Admelog on a sliding scale, Dr. Guthrie from [facility] called. increase free water intake to 250 q4H per NGT. rpt cbc and BMP in am via pediatric tubes. tranexamic acid, EPO, IV iron, folic acid and B12 as per heme  8/2: awake, alert, responds appropriately. on and off confusion, diaz in place w hematuria, no clots. off CBI.  ptn refused Blood work today. on NGT feeds w free water per NGT, hyperglycemia is ongoing, not controlled, endo on board. will DC IVF  8/3: ptn more awake and alert. on and off confusion. HH improving. heme following, diaz w pink urine. hypernatremia worsening despite FREE water via NGT  8/4: pulled NGT. will need to replace. ptn had filled out a MOLST form prior to arrival. no artifical feeds. remain NPO 2/2 failed swallow study. check HH and Na tomorrow. on IVF. getting procrit today. has pink urine in diaz  8/5: passed S&S, placed on a soft diet, has DI as per renal, placed on DDAVP, hence persistent hypernatremia. on IV ABx for bacteremia, rpt blood cx neg. Diaz in place, pink clear urine. HH is still quite low, on IV iron and procrit.   8/6: dc diaz, TOV as per urology. ABx as per ID. ptn wants to go home. check HH tomorrow. will need home w hospice. once ptn is on hospice i dont think PT is cont, will need to check.  will inquire about [facility]  8/7, 8/8: ptn states she feels fine. wants to go home. GLEN is improving. on DDAVP for DI post CVA. chronic hematuria. passed TOV. glucose still above goal but improving. hospice consult placed  8/9: ptn is AxOx3, wants to go home. she is DNR, she is tolerating a soft diet. she is severely anemic, she refuses transfusions, she has been receiving IV Iron and procrit. she has a chronic hematuria for which no intervention is being offered. she had a CVA, has a resolving right facial droop. ptn is referred to home hospice consult before she leaves  8/10: AxOx3, on IV Abx prob through 8/14 for enterococcal bacteremia 7/29, on 7/31 blood cx neg. has chronic gross hematuria, no clots, HH is severely low, refuses transfusions 2/2 Denominational believes. has metastatic breast Ca. awaiting home hospice consult.   8/11: ptn spoke w Hospice consult, asked for HCP to be involved in decision making as well. wants to go to [facility] where she [facility]. has severe anemia. refusing transfusions 2/2 Denominational believes. will finish 2 weeks IV abx for bacteremia on 8/14 8/12: rejected to be transferred to assisted living 2/2 severity of underlying illness. will be going to [facility] post completing on IV Abx

## 2021-08-12 NOTE — PROGRESS NOTE ADULT - PROBLEM SELECTOR PLAN 1
-test BG AC/HS.   Change  Lantus to 10 units QHS.  -c/w Admelog 8 units AC meals for now. If BG less than 100mg/dl and pt eating, can give 4 units w/meal.   -c/w Admelog low correction scale AC and Low HS scale  Discharge plan:  TBD based on GFR. Noted GFR <30 so pt can'T be on Metformin any more. Plan to discharge on basal insulin plus Orals. Doses TBD

## 2021-08-12 NOTE — PROGRESS NOTE ADULT - SUBJECTIVE AND OBJECTIVE BOX
Patient is a 70y old  Female who presents with a chief complaint of severe anemia, acute blood loss 2/2 hematuria (12 Aug 2021 15:16)      SUBJECTIVE / OVERNIGHT EVENTS: no new c/o, rejected to be transferred to assisted living 2/2 severity of underlying illness. will be going to [facility]    MEDICATIONS  (STANDING):  brimonidine 0.2% Ophthalmic Solution 1 Drop(s) Both EYES two times a day  desmopressin 0.1 milliGRAM(s) Oral daily  dextrose 40% Gel 15 Gram(s) Oral once  dextrose 5%. 1000 milliLiter(s) (50 mL/Hr) IV Continuous <Continuous>  dextrose 5%. 1000 milliLiter(s) (100 mL/Hr) IV Continuous <Continuous>  dextrose 50% Injectable 25 Gram(s) IV Push once  dextrose 50% Injectable 12.5 Gram(s) IV Push once  dextrose 50% Injectable 25 Gram(s) IV Push once  folic acid 1 milliGRAM(s) Enteral Tube daily  glucagon  Injectable 1 milliGRAM(s) IntraMuscular once  insulin glargine Injectable (LANTUS) 10 Unit(s) SubCutaneous at bedtime  insulin lispro (ADMELOG) corrective regimen sliding scale   SubCutaneous at bedtime  insulin lispro (ADMELOG) corrective regimen sliding scale   SubCutaneous three times a day before meals  insulin lispro Injectable (ADMELOG) 8 Unit(s) SubCutaneous three times a day with meals  latanoprost 0.005% Ophthalmic Solution 1 Drop(s) Both EYES at bedtime  levETIRAcetam  IVPB 500 milliGRAM(s) IV Intermittent every 12 hours  mirtazapine 15 milliGRAM(s) Oral at bedtime  pantoprazole  Injectable 40 milliGRAM(s) IV Push daily  senna 2 Tablet(s) Oral at bedtime  venlafaxine 37.5 milliGRAM(s) Oral every 12 hours    MEDICATIONS  (PRN):  acetaminophen   Tablet .. 650 milliGRAM(s) Oral every 6 hours PRN Temp greater or equal to 38C (100.4F), Mild Pain (1 - 3)  artificial  tears Solution 1 Drop(s) Both EYES four times a day PRN Dry Eyes      Vital Signs Last 24 Hrs  T(F): 97.9 (08-12-21 @ 11:55), Max: 98.2 (08-12-21 @ 04:48)  HR: 88 (08-12-21 @ 11:55) (88 - 98)  BP: 156/83 (08-12-21 @ 11:55) (139/71 - 156/83)  RR: 18 (08-12-21 @ 11:55) (17 - 18)  SpO2: 98% (08-12-21 @ 11:55) (97% - 98%)  Telemetry:   CAPILLARY BLOOD GLUCOSE      POCT Blood Glucose.: 155 mg/dL (12 Aug 2021 18:02)  POCT Blood Glucose.: 192 mg/dL (12 Aug 2021 12:14)  POCT Blood Glucose.: 204 mg/dL (12 Aug 2021 09:11)  POCT Blood Glucose.: 237 mg/dL (11 Aug 2021 21:03)    I&O's Summary    11 Aug 2021 07:01  -  12 Aug 2021 07:00  --------------------------------------------------------  IN: 1080 mL / OUT: 600 mL / NET: 480 mL    12 Aug 2021 07:01  -  12 Aug 2021 19:32  --------------------------------------------------------  IN: 200 mL / OUT: 1000 mL / NET: -800 mL        PHYSICAL EXAM:  GENERAL: NAD, well-developed  HEAD:  Atraumatic, Normocephalic  EYES: EOMI, PERRLA, conjunctiva and sclera clear  NECK: Supple, No JVD  CHEST/LUNG: Clear to auscultation bilaterally; No wheeze  HEART: Regular rate and rhythm; No murmurs, rubs, or gallops  ABDOMEN: Soft, Nontender, Nondistended; Bowel sounds present  EXTREMITIES:  2+ Peripheral Pulses, No clubbing, cyanosis, or edema  PSYCH: AAOx3  NEUROLOGY: non-focal  SKIN: No rashes or lesions    LABS:                    RADIOLOGY & ADDITIONAL TESTS:    Imaging Personally Reviewed:    Consultant(s) Notes Reviewed:      Care Discussed with Consultants/Other Providers:

## 2021-08-12 NOTE — PROGRESS NOTE ADULT - SUBJECTIVE AND OBJECTIVE BOX
Overnight events noted   VITAL: T(C): , Max: 36.8 (08-12-21 @ 04:48) T(F): , Max: 98.2 (08-12-21 @ 04:48) HR: 88 (08-12-21 @ 11:55) BP: 156/83 (08-12-21 @ 11:55) RR: 18 (08-12-21 @ 11:55) SpO2: 98% (08-12-21 @ 11:55)   PHYSICAL EXAM: Constitutional: alert; NAD HEENT: NCAT, DMM Neck: Supple, No JVD Respiratory: CTA-b/l Cardiovascular: RRR s1s2 Gastrointestinal: BS+, soft, NT/ND : (+)primafit Extremities: No peripheral edema b/l Neurological: no focal deficits; reduced generalized strength Back: no CVAT b/l Skin: No rashes, no nevi   IMPRESSION: 70F, Yazidi, with breast CA, 7/27/21 p/w vaginal bleeding/severe anemia  (1)Renal - CKD/resolved superimposed ischemic ATN  (2)Hypernatremia - DI - now controlled, with DDAVP (and in setting of patient having access to free water)  (3)ID - enterococcal bacteremia - on IV Ampicillin  (4)Anemia - severe - in setting of being a Yazidi. Iron deficiency; receiving frequent doses of IV iron; s/p Retacrit 8/4 and 8/11 (today)   RECOMMEND: (1)Meds as ordered/dose new meds for GFR 40-50ml/min     David Nixon MD [facility] Office: [phone] Cell: [phone]       no complaints   VITAL: T(C): , Max: 36.8 (08-12-21 @ 04:48) T(F): , Max: 98.2 (08-12-21 @ 04:48) HR: 88 (08-12-21 @ 11:55) BP: 156/83 (08-12-21 @ 11:55) RR: 18 (08-12-21 @ 11:55) SpO2: 98% (08-12-21 @ 11:55)   PHYSICAL EXAM: Constitutional: alert; NAD HEENT: NCAT, DMM Neck: Supple, No JVD Respiratory: CTA-b/l Cardiovascular: RRR s1s2 Gastrointestinal: BS+, soft, NT/ND : (+)primafit Extremities: No peripheral edema b/l Neurological: no focal deficits; reduced generalized strength Back: no CVAT b/l Skin: No rashes, no nevi   IMPRESSION: 70F, Adventism, with breast CA, 7/27/21 p/w vaginal bleeding/severe anemia  (1)Renal - CKD/resolved superimposed ischemic ATN  (2)Hypernatremia - DI - now controlled, with DDAVP (and in setting of patient having access to free water)  (3)ID - enterococcal bacteremia - on IV Ampicillin  (4)Anemia - severe - in setting of being a Adventism. Iron deficiency; receiving frequent doses of IV iron; s/p Retacrit 8/4 and 8/11 (today)   RECOMMEND: (1)Meds as ordered/dose new meds for GFR 40-50ml/min     David Nixon MD [facility] Office: [phone] Cell: [phone]

## 2021-08-12 NOTE — PROGRESS NOTE ADULT - SUBJECTIVE AND OBJECTIVE BOX
Subjective: Patient seen and examined. No new events except as noted.     REVIEW OF SYSTEMS:    CONSTITUTIONAL: + weakness, fevers or chills  EYES/ENT: No visual changes;  No vertigo or throat pain   NECK: No pain or stiffness  RESPIRATORY: No cough, wheezing, hemoptysis; No shortness of breath  CARDIOVASCULAR: No chest pain or palpitations  GASTROINTESTINAL: No abdominal or epigastric pain. No nausea, vomiting, or hematemesis; No diarrhea or constipation. No melena or hematochezia.  GENITOURINARY: No dysuria, frequency or hematuria  NEUROLOGICAL: No numbness or weakness  SKIN: No itching, burning, rashes, or lesions   All other review of systems is negative unless indicated above.    MEDICATIONS:  MEDICATIONS  (STANDING):  ampicillin  IVPB 2 Gram(s) IV Intermittent every 6 hours  brimonidine 0.2% Ophthalmic Solution 1 Drop(s) Both EYES two times a day  desmopressin 0.1 milliGRAM(s) Oral daily  dextrose 40% Gel 15 Gram(s) Oral once  dextrose 5%. 1000 milliLiter(s) (50 mL/Hr) IV Continuous <Continuous>  dextrose 5%. 1000 milliLiter(s) (100 mL/Hr) IV Continuous <Continuous>  dextrose 50% Injectable 25 Gram(s) IV Push once  dextrose 50% Injectable 12.5 Gram(s) IV Push once  dextrose 50% Injectable 25 Gram(s) IV Push once  folic acid 1 milliGRAM(s) Enteral Tube daily  glucagon  Injectable 1 milliGRAM(s) IntraMuscular once  insulin glargine Injectable (LANTUS) 9 Unit(s) SubCutaneous at bedtime  insulin lispro (ADMELOG) corrective regimen sliding scale   SubCutaneous three times a day before meals  insulin lispro (ADMELOG) corrective regimen sliding scale   SubCutaneous at bedtime  insulin lispro Injectable (ADMELOG) 8 Unit(s) SubCutaneous three times a day with meals  latanoprost 0.005% Ophthalmic Solution 1 Drop(s) Both EYES at bedtime  levETIRAcetam  IVPB 500 milliGRAM(s) IV Intermittent every 12 hours  mirtazapine 15 milliGRAM(s) Oral at bedtime  pantoprazole  Injectable 40 milliGRAM(s) IV Push daily  senna 2 Tablet(s) Oral at bedtime  venlafaxine 37.5 milliGRAM(s) Oral every 12 hours      PHYSICAL EXAM:  T(C): 36.8 (08-12-21 @ 04:48), Max: 36.8 (08-12-21 @ 04:48)  HR: 97 (08-12-21 @ 04:48) (92 - 98)  BP: 139/71 (08-12-21 @ 04:48) (139/71 - 147/73)  RR: 18 (08-12-21 @ 04:48) (17 - 18)  SpO2: 97% (08-12-21 @ 04:48) (97% - 98%)  Wt(kg): --  I&O's Summary    11 Aug 2021 07:01  -  12 Aug 2021 07:00  --------------------------------------------------------  IN: 1080 mL / OUT: 600 mL / NET: 480 mL          Appearance: NAD  HEENT:   Dry  oral mucosa, PERRL, EOMI	  Lymphatic: No lymphadenopathy , no edema  Cardiovascular: Normal S1 S2, No JVD,+ murmurs , Peripheral pulses palpable 2+ bilaterally  Respiratory: Decreased bs   Gastrointestinal:  Soft, Non-tender, + BS	  Skin: No rashes, No ecchymoses, No cyanosis, warm to touch  Musculoskeletal: Normal range of motion, normal strength  Psychiatry:  sleepy   Ext: No edema      LABS:    CARDIAC MARKERS:                  proBNP:   Lipid Profile:   HgA1c:   TSH:             TELEMETRY: 	    ECG:  	  RADIOLOGY:   DIAGNOSTIC TESTING:  [ ] Echocardiogram:  [ ]  Catheterization:  [ ] Stress Test:    OTHER:

## 2021-08-13 VITALS
DIASTOLIC BLOOD PRESSURE: 75 MMHG | HEART RATE: 99 BPM | OXYGEN SATURATION: 98 % | RESPIRATION RATE: 18 BRPM | SYSTOLIC BLOOD PRESSURE: 152 MMHG | TEMPERATURE: 98 F

## 2021-08-13 LAB
GLUCOSE BLDC GLUCOMTR-MCNC: 136 MG/DL — HIGH (ref 70–99)
GLUCOSE BLDC GLUCOMTR-MCNC: 160 MG/DL — HIGH (ref 70–99)
GLUCOSE BLDC GLUCOMTR-MCNC: 177 MG/DL — HIGH (ref 70–99)

## 2021-08-13 PROCEDURE — 71045 X-RAY EXAM CHEST 1 VIEW: CPT

## 2021-08-13 PROCEDURE — 92526 ORAL FUNCTION THERAPY: CPT

## 2021-08-13 PROCEDURE — 87040 BLOOD CULTURE FOR BACTERIA: CPT

## 2021-08-13 PROCEDURE — 86901 BLOOD TYPING SEROLOGIC RH(D): CPT

## 2021-08-13 PROCEDURE — U0005: CPT

## 2021-08-13 PROCEDURE — 99285 EMERGENCY DEPT VISIT HI MDM: CPT

## 2021-08-13 PROCEDURE — 86900 BLOOD TYPING SEROLOGIC ABO: CPT

## 2021-08-13 PROCEDURE — 86803 HEPATITIS C AB TEST: CPT

## 2021-08-13 PROCEDURE — 80053 COMPREHEN METABOLIC PANEL: CPT

## 2021-08-13 PROCEDURE — 74178 CT ABD&PLV WO CNTR FLWD CNTR: CPT | Mod: MA

## 2021-08-13 PROCEDURE — 84295 ASSAY OF SERUM SODIUM: CPT

## 2021-08-13 PROCEDURE — 85027 COMPLETE CBC AUTOMATED: CPT

## 2021-08-13 PROCEDURE — U0003: CPT

## 2021-08-13 PROCEDURE — 93306 TTE W/DOPPLER COMPLETE: CPT

## 2021-08-13 PROCEDURE — 99232 SBSQ HOSP IP/OBS MODERATE 35: CPT

## 2021-08-13 PROCEDURE — 85018 HEMOGLOBIN: CPT

## 2021-08-13 PROCEDURE — 80061 LIPID PANEL: CPT

## 2021-08-13 PROCEDURE — 93005 ELECTROCARDIOGRAM TRACING: CPT

## 2021-08-13 PROCEDURE — 82962 GLUCOSE BLOOD TEST: CPT

## 2021-08-13 PROCEDURE — 86769 SARS-COV-2 COVID-19 ANTIBODY: CPT

## 2021-08-13 PROCEDURE — 86850 RBC ANTIBODY SCREEN: CPT

## 2021-08-13 PROCEDURE — 80048 BASIC METABOLIC PNL TOTAL CA: CPT

## 2021-08-13 PROCEDURE — 83036 HEMOGLOBIN GLYCOSYLATED A1C: CPT

## 2021-08-13 PROCEDURE — 82803 BLOOD GASES ANY COMBINATION: CPT

## 2021-08-13 PROCEDURE — 85610 PROTHROMBIN TIME: CPT

## 2021-08-13 PROCEDURE — 83540 ASSAY OF IRON: CPT

## 2021-08-13 PROCEDURE — 83605 ASSAY OF LACTIC ACID: CPT

## 2021-08-13 PROCEDURE — 82728 ASSAY OF FERRITIN: CPT

## 2021-08-13 PROCEDURE — 85025 COMPLETE CBC W/AUTO DIFF WBC: CPT

## 2021-08-13 PROCEDURE — 82330 ASSAY OF CALCIUM: CPT

## 2021-08-13 PROCEDURE — 84100 ASSAY OF PHOSPHORUS: CPT

## 2021-08-13 PROCEDURE — 85730 THROMBOPLASTIN TIME PARTIAL: CPT

## 2021-08-13 PROCEDURE — 87077 CULTURE AEROBIC IDENTIFY: CPT

## 2021-08-13 PROCEDURE — 71250 CT THORAX DX C-: CPT

## 2021-08-13 PROCEDURE — 85014 HEMATOCRIT: CPT

## 2021-08-13 PROCEDURE — 87150 DNA/RNA AMPLIFIED PROBE: CPT

## 2021-08-13 PROCEDURE — 84145 PROCALCITONIN (PCT): CPT

## 2021-08-13 PROCEDURE — 87086 URINE CULTURE/COLONY COUNT: CPT

## 2021-08-13 PROCEDURE — 83935 ASSAY OF URINE OSMOLALITY: CPT

## 2021-08-13 PROCEDURE — 81001 URINALYSIS AUTO W/SCOPE: CPT

## 2021-08-13 PROCEDURE — 92610 EVALUATE SWALLOWING FUNCTION: CPT

## 2021-08-13 PROCEDURE — 82435 ASSAY OF BLOOD CHLORIDE: CPT

## 2021-08-13 PROCEDURE — 87186 SC STD MICRODIL/AGAR DIL: CPT

## 2021-08-13 PROCEDURE — 82947 ASSAY GLUCOSE BLOOD QUANT: CPT

## 2021-08-13 PROCEDURE — 76856 US EXAM PELVIC COMPLETE: CPT

## 2021-08-13 PROCEDURE — 76830 TRANSVAGINAL US NON-OB: CPT

## 2021-08-13 PROCEDURE — 70450 CT HEAD/BRAIN W/O DYE: CPT

## 2021-08-13 PROCEDURE — 84443 ASSAY THYROID STIM HORMONE: CPT

## 2021-08-13 PROCEDURE — 83735 ASSAY OF MAGNESIUM: CPT

## 2021-08-13 PROCEDURE — 84132 ASSAY OF SERUM POTASSIUM: CPT

## 2021-08-13 RX ORDER — MIDODRINE HYDROCHLORIDE 2.5 MG/1
1 TABLET ORAL
Qty: 0 | Refills: 0 | DISCHARGE

## 2021-08-13 RX ORDER — SITAGLIPTIN AND METFORMIN HYDROCHLORIDE 500; 50 MG/1; MG/1
0.5 TABLET, FILM COATED ORAL
Qty: 0 | Refills: 0 | DISCHARGE

## 2021-08-13 RX ORDER — INSULIN LISPRO 100/ML
8 VIAL (ML) SUBCUTANEOUS
Qty: 0 | Refills: 0 | DISCHARGE
Start: 2021-08-13

## 2021-08-13 RX ORDER — MIRTAZAPINE 45 MG/1
1 TABLET, ORALLY DISINTEGRATING ORAL
Qty: 0 | Refills: 0 | DISCHARGE

## 2021-08-13 RX ORDER — SENNA PLUS 8.6 MG/1
2 TABLET ORAL
Qty: 0 | Refills: 0 | DISCHARGE
Start: 2021-08-13

## 2021-08-13 RX ORDER — MIRTAZAPINE 45 MG/1
1 TABLET, ORALLY DISINTEGRATING ORAL
Qty: 0 | Refills: 0 | DISCHARGE
Start: 2021-08-13

## 2021-08-13 RX ORDER — LEVETIRACETAM 250 MG/1
1 TABLET, FILM COATED ORAL
Qty: 0 | Refills: 0 | DISCHARGE

## 2021-08-13 RX ORDER — ONDANSETRON 8 MG/1
1 TABLET, FILM COATED ORAL
Qty: 0 | Refills: 0 | DISCHARGE

## 2021-08-13 RX ORDER — VENLAFAXINE HCL 75 MG
3 CAPSULE, EXT RELEASE 24 HR ORAL
Qty: 0 | Refills: 0 | DISCHARGE

## 2021-08-13 RX ORDER — DESMOPRESSIN ACETATE 0.1 MG/1
1 TABLET ORAL
Qty: 0 | Refills: 0 | DISCHARGE
Start: 2021-08-13

## 2021-08-13 RX ORDER — SUCRALFATE 1 G
1 TABLET ORAL
Qty: 0 | Refills: 0 | DISCHARGE

## 2021-08-13 RX ORDER — AMOXICILLIN 250 MG/5ML
1 SUSPENSION, RECONSTITUTED, ORAL (ML) ORAL
Qty: 0 | Refills: 0 | DISCHARGE

## 2021-08-13 RX ORDER — INSULIN LISPRO 100/ML
4 VIAL (ML) SUBCUTANEOUS
Qty: 0 | Refills: 0 | DISCHARGE

## 2021-08-13 RX ORDER — AMPICILLIN TRIHYDRATE 250 MG
2 CAPSULE ORAL EVERY 6 HOURS
Refills: 0 | Status: COMPLETED | OUTPATIENT
Start: 2021-08-13 | End: 2021-08-13

## 2021-08-13 RX ORDER — INSULIN GLARGINE 100 [IU]/ML
10 INJECTION, SOLUTION SUBCUTANEOUS
Qty: 0 | Refills: 0 | DISCHARGE
Start: 2021-08-13

## 2021-08-13 RX ORDER — FOLIC ACID 0.8 MG
1 TABLET ORAL
Qty: 0 | Refills: 0 | DISCHARGE
Start: 2021-08-13

## 2021-08-13 RX ORDER — VENLAFAXINE HCL 75 MG
1 CAPSULE, EXT RELEASE 24 HR ORAL
Qty: 0 | Refills: 0 | DISCHARGE
Start: 2021-08-13

## 2021-08-13 RX ADMIN — Medication 1: at 13:25

## 2021-08-13 RX ADMIN — Medication 8 UNIT(S): at 17:58

## 2021-08-13 RX ADMIN — Medication 1 MILLIGRAM(S): at 11:23

## 2021-08-13 RX ADMIN — Medication 37.5 MILLIGRAM(S): at 06:22

## 2021-08-13 RX ADMIN — Medication 8 UNIT(S): at 13:24

## 2021-08-13 RX ADMIN — Medication 8 UNIT(S): at 09:17

## 2021-08-13 RX ADMIN — Medication 216 GRAM(S): at 16:13

## 2021-08-13 RX ADMIN — DESMOPRESSIN ACETATE 0.1 MILLIGRAM(S): 0.1 TABLET ORAL at 11:23

## 2021-08-13 RX ADMIN — BRIMONIDINE TARTRATE 1 DROP(S): 2 SOLUTION/ DROPS OPHTHALMIC at 17:59

## 2021-08-13 RX ADMIN — BRIMONIDINE TARTRATE 1 DROP(S): 2 SOLUTION/ DROPS OPHTHALMIC at 06:23

## 2021-08-13 RX ADMIN — PANTOPRAZOLE SODIUM 40 MILLIGRAM(S): 20 TABLET, DELAYED RELEASE ORAL at 11:23

## 2021-08-13 RX ADMIN — LEVETIRACETAM 400 MILLIGRAM(S): 250 TABLET, FILM COATED ORAL at 11:24

## 2021-08-13 RX ADMIN — Medication 37.5 MILLIGRAM(S): at 17:59

## 2021-08-13 RX ADMIN — Medication 1: at 09:17

## 2021-08-13 NOTE — PROGRESS NOTE ADULT - SUBJECTIVE AND OBJECTIVE BOX
Patient is a 70y old  Female who presents with a chief complaint of severe anemia, acute blood loss 2/2 hematuria (13 Aug 2021 14:16)      SUBJECTIVE / OVERNIGHT EVENTS:    MEDICATIONS  (STANDING):  brimonidine 0.2% Ophthalmic Solution 1 Drop(s) Both EYES two times a day  desmopressin 0.1 milliGRAM(s) Oral daily  dextrose 40% Gel 15 Gram(s) Oral once  dextrose 5%. 1000 milliLiter(s) (50 mL/Hr) IV Continuous <Continuous>  dextrose 5%. 1000 milliLiter(s) (100 mL/Hr) IV Continuous <Continuous>  dextrose 50% Injectable 25 Gram(s) IV Push once  dextrose 50% Injectable 12.5 Gram(s) IV Push once  dextrose 50% Injectable 25 Gram(s) IV Push once  folic acid 1 milliGRAM(s) Enteral Tube daily  glucagon  Injectable 1 milliGRAM(s) IntraMuscular once  insulin glargine Injectable (LANTUS) 10 Unit(s) SubCutaneous at bedtime  insulin lispro (ADMELOG) corrective regimen sliding scale   SubCutaneous three times a day before meals  insulin lispro (ADMELOG) corrective regimen sliding scale   SubCutaneous at bedtime  insulin lispro Injectable (ADMELOG) 8 Unit(s) SubCutaneous three times a day with meals  latanoprost 0.005% Ophthalmic Solution 1 Drop(s) Both EYES at bedtime  levETIRAcetam  IVPB 500 milliGRAM(s) IV Intermittent every 12 hours  mirtazapine 15 milliGRAM(s) Oral at bedtime  pantoprazole  Injectable 40 milliGRAM(s) IV Push daily  senna 2 Tablet(s) Oral at bedtime  venlafaxine 37.5 milliGRAM(s) Oral every 12 hours    MEDICATIONS  (PRN):  acetaminophen   Tablet .. 650 milliGRAM(s) Oral every 6 hours PRN Temp greater or equal to 38C (100.4F), Mild Pain (1 - 3)  artificial  tears Solution 1 Drop(s) Both EYES four times a day PRN Dry Eyes      Vital Signs Last 24 Hrs  T(F): 98 (08-13-21 @ 20:31), Max: 98.6 (08-13-21 @ 05:28)  HR: 99 (08-13-21 @ 20:31) (91 - 99)  BP: 152/75 (08-13-21 @ 20:31) (139/84 - 157/74)  RR: 18 (08-13-21 @ 20:31) (17 - 18)  SpO2: 98% (08-13-21 @ 20:31) (98% - 100%)  Telemetry:   CAPILLARY BLOOD GLUCOSE      POCT Blood Glucose.: 136 mg/dL (13 Aug 2021 17:50)  POCT Blood Glucose.: 160 mg/dL (13 Aug 2021 13:19)  POCT Blood Glucose.: 177 mg/dL (13 Aug 2021 09:10)    I&O's Summary    12 Aug 2021 07:01  -  13 Aug 2021 07:00  --------------------------------------------------------  IN: 300 mL / OUT: 2000 mL / NET: -1700 mL    13 Aug 2021 07:01  -  13 Aug 2021 22:08  --------------------------------------------------------  IN: 200 mL / OUT: 2300 mL / NET: -2100 mL        PHYSICAL EXAM:  GENERAL: NAD, well-developed  HEAD:  Atraumatic, Normocephalic  EYES: EOMI, PERRLA, conjunctiva and sclera clear  NECK: Supple, No JVD  CHEST/LUNG: Clear to auscultation bilaterally; No wheeze  HEART: Regular rate and rhythm; No murmurs, rubs, or gallops  ABDOMEN: Soft, Nontender, Nondistended; Bowel sounds present  EXTREMITIES:  2+ Peripheral Pulses, No clubbing, cyanosis, or edema  PSYCH: AAOx3  NEUROLOGY: non-focal  SKIN: No rashes or lesions    LABS:                    RADIOLOGY & ADDITIONAL TESTS:    Imaging Personally Reviewed:    Consultant(s) Notes Reviewed:      Care Discussed with Consultants/Other Providers:

## 2021-08-13 NOTE — PROGRESS NOTE ADULT - SUPERVISING ATTENDING
Ayde Patrick
Dr Ayde Patrick
Ayde Patrick
Dr Ayde Patrick

## 2021-08-13 NOTE — CHART NOTE - NSCHARTNOTEFT_GEN_A_CORE
Nutrition Follow Up Note  Patient seen for: malnutrition follow up on 3 DSU    Chart reviewed, events noted.  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     Source: [x] Patient       [x] EMR        [] RN        [] Family at bedside       [x] Other: RD observation    -If unable to interview patient: [] Trach/Vent/BiPAP  [x] Disoriented/confused/inappropriate to interview-to situation    Diet Order:   Diet, Soft:   Consistent Carbohydrate {No Snacks} (CSTCHO) (21)    - Is current order appropriate/adequate? [x] Yes  []  No:     - PO intake meals :   [] >75%  Adequate    [x] 50-75%  Fair       [] <50%  Poor  - PO intake of supplements if pt receiving: []>75% []50% []25%   as per   []flow sheet  [x]patient  []family/aide  []PCA  []Nurse  [x]RD observation    - Nutrition-related concerns: moderate malnutrition    pt seen by SLP [x]Yes []No  Initiate a soft texture diet    GI:  Last BM ___.   Bowel Regimen? [x] Yes-senna   [] No      Weights:   Daily Weight in k.5 (), Weight in k (), Weight in k.1 ()    Nutritionally Pertinent MEDICATIONS  (STANDING):  desmopressin  dextrose 40% Gel  dextrose 5%.  dextrose 5%.  dextrose 50% Injectable  dextrose 50% Injectable  dextrose 50% Injectable  folic acid  glucagon  Injectable  insulin glargine Injectable (LANTUS)  insulin lispro (ADMELOG) corrective regimen sliding scale  insulin lispro (ADMELOG) corrective regimen sliding scale  insulin lispro Injectable (ADMELOG)  pantoprazole  Injectable  senna    Pertinent Labs:   A1C with Estimated Average Glucose Result: 6.5 % (21 @ 09:21)    Finger Sticks:  POCT Blood Glucose.: 177 mg/dL ( @ 09:10)  POCT Blood Glucose.: 130 mg/dL ( @ 21:54)  POCT Blood Glucose.: 155 mg/dL ( @ 18:02)  POCT Blood Glucose.: 192 mg/dL ( @ 12:14)      Pressure Injuries as per nursing documentation: none  Edema: none    Estimated Needs:   [x] no change since previous assessment  [] recalculated:     Previous Nutrition Diagnosis: moderate malnutrition  Nutrition Diagnosis is: [x] ongoing  [] resolved [] not applicable     New Nutrition Diagnosis: [x] Not applicable    Nutrition Care Plan:  [] In Progress  [x] Achieved  [] Not applicable    Nutrition Interventions:     Education Provided:       [x] Yes:  [x] No:   pt was educated on the importance of supplements to increase calorie and protein intake in light of RD's nutritional findings [x]Yes []N/A         Recommendations:         [x] Continue current diet order     [] Change diet to:      [] continue oral nutrition supplement:        [] Add micronutrient supplementation:      [] Continue current micronutrient supplementation:        []Discussed recommendations with provider     [] Needed to escalate to provider     []Placed pending verification with NP/PA      []Placed pending verification with Team      []Placed sticker (malnutrition/BMI/underweight)     []Recommend swallow evaluation     [] monitor need for diet ed reinforcement     [] Other:     Monitoring and Evaluation:   Continue to monitor nutritional intake, tolerance to diet prescription, weights, labs, skin integrity      RD remains available upon request and will follow up per protocol  Ximena Rojas MA, RASTA, CDN #[phone]

## 2021-08-13 NOTE — PROGRESS NOTE ADULT - PROVIDER SPECIALTY LIST ADULT
Heme/Onc
Infectious Disease
Infectious Disease
Internal Medicine
Nephrology
Urology
Urology
Cardiology
Endocrinology
Heme/Onc
Heme/Onc
Infectious Disease
Internal Medicine
Internal Medicine
Nephrology
Urology
Cardiology
Heme/Onc
Infectious Disease
Internal Medicine
Nephrology
Urology
Urology
Cardiology
Heme/Onc
Internal Medicine
Nephrology
Nephrology
Urology
Urology
Heme/Onc
Heme/Onc
Internal Medicine
Cardiology
Endocrinology
Cardiology
Cardiology
Endocrinology
Cardiology
Cardiology
Endocrinology
Cardiology
Cardiology
Endocrinology
Endocrinology
Cardiology
Endocrinology
Cardiology
Cardiology
Endocrinology
Endocrinology

## 2021-08-13 NOTE — DISCHARGE NOTE NURSING/CASE MANAGEMENT/SOCIAL WORK - PATIENT PORTAL LINK FT
You can access the FollowMyHealth Patient Portal offered by [facility] by registering at the following website: [email or web address]. By joining [employer]’s FollowMyHealth portal, you will also be able to view your health information using other applications (apps) compatible with our system.

## 2021-08-13 NOTE — PROGRESS NOTE ADULT - SUBJECTIVE AND OBJECTIVE BOX
Subjective: Patient seen and examined. No new events except as noted.     REVIEW OF SYSTEMS:  Unable to obtain        MEDICATIONS:  MEDICATIONS  (STANDING):  brimonidine 0.2% Ophthalmic Solution 1 Drop(s) Both EYES two times a day  desmopressin 0.1 milliGRAM(s) Oral daily  dextrose 40% Gel 15 Gram(s) Oral once  dextrose 5%. 1000 milliLiter(s) (50 mL/Hr) IV Continuous <Continuous>  dextrose 5%. 1000 milliLiter(s) (100 mL/Hr) IV Continuous <Continuous>  dextrose 50% Injectable 25 Gram(s) IV Push once  dextrose 50% Injectable 12.5 Gram(s) IV Push once  dextrose 50% Injectable 25 Gram(s) IV Push once  folic acid 1 milliGRAM(s) Enteral Tube daily  glucagon  Injectable 1 milliGRAM(s) IntraMuscular once  insulin glargine Injectable (LANTUS) 10 Unit(s) SubCutaneous at bedtime  insulin lispro (ADMELOG) corrective regimen sliding scale   SubCutaneous at bedtime  insulin lispro (ADMELOG) corrective regimen sliding scale   SubCutaneous three times a day before meals  insulin lispro Injectable (ADMELOG) 8 Unit(s) SubCutaneous three times a day with meals  latanoprost 0.005% Ophthalmic Solution 1 Drop(s) Both EYES at bedtime  levETIRAcetam  IVPB 500 milliGRAM(s) IV Intermittent every 12 hours  mirtazapine 15 milliGRAM(s) Oral at bedtime  pantoprazole  Injectable 40 milliGRAM(s) IV Push daily  senna 2 Tablet(s) Oral at bedtime  venlafaxine 37.5 milliGRAM(s) Oral every 12 hours      PHYSICAL EXAM:  T(C): 37 (08-13-21 @ 05:28), Max: 37 (08-13-21 @ 05:28)  HR: 96 (08-13-21 @ 05:28) (88 - 96)  BP: 139/84 (08-13-21 @ 05:28) (135/74 - 156/83)  RR: 17 (08-13-21 @ 05:28) (17 - 18)  SpO2: 98% (08-13-21 @ 05:28) (98% - 98%)  Wt(kg): --  I&O's Summary    12 Aug 2021 07:01  -  13 Aug 2021 07:00  --------------------------------------------------------  IN: 300 mL / OUT: 2000 mL / NET: -1700 mL    13 Aug 2021 07:01  -  13 Aug 2021 10:28  --------------------------------------------------------  IN: 0 mL / OUT: 900 mL / NET: -900 mL          Appearance: NAD  HEENT:   Dry  oral mucosa, PERRL, EOMI	  Lymphatic: No lymphadenopathy , no edema  Cardiovascular: Normal S1 S2, No JVD,+ murmurs , Peripheral pulses palpable 2+ bilaterally  Respiratory: Decreased bs   Gastrointestinal:  Soft, Non-tender, + BS	  Skin: No rashes, No ecchymoses, No cyanosis, warm to touch  Musculoskeletal: Normal range of motion, normal strength  Psychiatry:  sleepy   Ext: No edema            LABS:    CARDIAC MARKERS:                  proBNP:   Lipid Profile:   HgA1c:   TSH:             TELEMETRY: 	    ECG:  	  RADIOLOGY:   DIAGNOSTIC TESTING:  [ ] Echocardiogram:  [ ]  Catheterization:  [ ] Stress Test:    OTHER:

## 2021-08-13 NOTE — PROGRESS NOTE ADULT - PROBLEM SELECTOR PROBLEM 3
Hematuria
Hyperlipidemia, unspecified hyperlipidemia type
Hematuria
Hyperlipidemia, unspecified hyperlipidemia type
Hematuria
Hematuria
Hyperlipidemia, unspecified hyperlipidemia type
Hyperlipidemia, unspecified hyperlipidemia type
Hematuria

## 2021-08-13 NOTE — PROGRESS NOTE ADULT - PROBLEM SELECTOR PROBLEM 2
Hyperlipidemia, unspecified hyperlipidemia type
Severe anemia
Hyperlipidemia, unspecified hyperlipidemia type
Severe anemia
Severe anemia
Essential hypertension
Essential hypertension
Hyperlipidemia, unspecified hyperlipidemia type
Severe anemia
Essential hypertension
Hyperlipidemia, unspecified hyperlipidemia type
Severe anemia
Severe anemia
Hyperlipidemia, unspecified hyperlipidemia type
Severe anemia
Essential hypertension
Severe anemia

## 2021-08-13 NOTE — PROGRESS NOTE ADULT - PA/NP ONLY VISIT
ACP only visit

## 2021-08-13 NOTE — PROGRESS NOTE ADULT - NUTRITIONAL ASSESSMENT
This patient has been assessed with a concern for Malnutrition and has been determined to have a diagnosis/diagnoses of Moderate protein-calorie malnutrition.    This patient is being managed with:   Diet Soft-  Consistent Carbohydrate {No Snacks} (CSTCHO)  Entered: Aug  4 2021 12:01PM    
This patient has been assessed with a concern for Malnutrition and has been determined to have a diagnosis/diagnoses of Moderate protein-calorie malnutrition.    This patient is being managed with:   Diet Soft-  Consistent Carbohydrate {No Snacks} (CSTCHO)  Entered: Aug  4 2021 12:01PM    
Diet, Soft:   Consistent Carbohydrate {No Snacks} (CSTCHO) (08-04-21 @ 12:01) [Active]    Routine Diet consult needed
This patient has been assessed with a concern for Malnutrition and has been determined to have a diagnosis/diagnoses of Moderate protein-calorie malnutrition.    This patient is being managed with:   Diet Soft-  Consistent Carbohydrate {No Snacks} (CSTCHO)  Entered: Aug  4 2021 12:01PM    
Diet, NPO with Tube Feed:   Tube Feeding Modality: Nasogastric  Glucerna 1.2 Mike (GLUCERNARTH)  Total Volume for 24 Hours (mL): 1200  Continuous  Starting Tube Feed Rate {mL per Hour}: 10  Increase Tube Feed Rate by (mL): 10     Every 6 hours  Until Goal Tube Feed Rate (mL per Hour): 50  Tube Feed Duration (in Hours): 24  Tube Feed Start Time: 16:00 (07-30-21 @ 16:48) [Active]    Appreciated RD consult
This patient has been assessed with a concern for Malnutrition and has been determined to have a diagnosis/diagnoses of Moderate protein-calorie malnutrition.    This patient is being managed with:   Diet NPO with Tube Feed-  Tube Feeding Modality: Nasogastric  Glucerna 1.2 Mike (GLUCERNARTH)  Total Volume for 24 Hours (mL): 1200  Continuous  Starting Tube Feed Rate {mL per Hour}: 10  Increase Tube Feed Rate by (mL): 10     Every 6 hours  Until Goal Tube Feed Rate (mL per Hour): 50  Tube Feed Duration (in Hours): 24  Tube Feed Start Time: 16:00  Entered: Jul 30 2021  4:48PM    
This patient has been assessed with a concern for Malnutrition and has been determined to have a diagnosis/diagnoses of Moderate protein-calorie malnutrition.    This patient is being managed with:   Diet Soft-  Consistent Carbohydrate {No Snacks} (CSTCHO)  Entered: Aug  4 2021 12:01PM    
Diet, Soft:   Consistent Carbohydrate {No Snacks} (CSTCHO) (08-04-21 @ 12:01) [Active]
This patient has been assessed with a concern for Malnutrition and has been determined to have a diagnosis/diagnoses of Moderate protein-calorie malnutrition.    This patient is being managed with:   Diet NPO with Tube Feed-  Tube Feeding Modality: Nasogastric  Glucerna 1.2 Mike (GLUCERNARTH)  Total Volume for 24 Hours (mL): 1200  Continuous  Starting Tube Feed Rate {mL per Hour}: 10  Increase Tube Feed Rate by (mL): 10     Every 6 hours  Until Goal Tube Feed Rate (mL per Hour): 50  Tube Feed Duration (in Hours): 24  Tube Feed Start Time: 16:00  Entered: Jul 30 2021  4:48PM    
This patient has been assessed with a concern for Malnutrition and has been determined to have a diagnosis/diagnoses of Moderate protein-calorie malnutrition.    This patient is being managed with:   Diet NPO with Tube Feed-  Tube Feeding Modality: Nasogastric  Glucerna 1.2 Mike (GLUCERNARTH)  Total Volume for 24 Hours (mL): 1200  Continuous  Starting Tube Feed Rate {mL per Hour}: 10  Increase Tube Feed Rate by (mL): 10     Every 6 hours  Until Goal Tube Feed Rate (mL per Hour): 50  Tube Feed Duration (in Hours): 24  Tube Feed Start Time: 16:00  Entered: Jul 30 2021  4:48PM    
This patient has been assessed with a concern for Malnutrition and has been determined to have a diagnosis/diagnoses of Moderate protein-calorie malnutrition.    This patient is being managed with:   Diet Soft-  Consistent Carbohydrate {No Snacks} (CSTCHO)  Entered: Aug  4 2021 12:01PM    
This patient has been assessed with a concern for Malnutrition and has been determined to have a diagnosis/diagnoses of Moderate protein-calorie malnutrition.    This patient is being managed with:   Diet Soft-  Consistent Carbohydrate {No Snacks} (CSTCHO)  Entered: Aug  4 2021 12:01PM    
Diet, Soft:   Consistent Carbohydrate {No Snacks} (CSTCHO) (08-04-21 @ 12:01) [Active]
Diet, Soft:   Consistent Carbohydrate {No Snacks} (CSTCHO) (08-04-21 @ 12:01) [Active]
Diet, Soft:   Consistent Carbohydrate {No Snacks} (CSTCHO) (08-04-21 @ 12:01) [Active]    Please see RD assessment and/or follow up.  Managed by primary team as well    Appreciated RD consult
Diet, Soft:   Consistent Carbohydrate {No Snacks} (CSTCHO) (08-04-21 @ 12:01) [Active]    Routine Diet consult needed
Diet, Soft:   Consistent Carbohydrate {No Snacks} (CSTCHO) (08-04-21 @ 12:01) [Active]    Routine Diet consult needed
Diet, Soft:   Consistent Carbohydrate {No Snacks} (CSTCHO) (08-04-21 @ 12:01) [Active]
Diet, Soft:   Consistent Carbohydrate {No Snacks} (CSTCHO) (08-04-21 @ 12:01) [Active]    Please see RD assessment and/or follow up.  Managed by primary team as well
Diet, Soft:   Consistent Carbohydrate {No Snacks} (CSTCHO) (08-04-21 @ 12:01) [Active]

## 2021-08-13 NOTE — PROGRESS NOTE ADULT - REASON FOR ADMISSION
severe anemia, acute blood loss 2/2 hematuria

## 2021-08-13 NOTE — PROGRESS NOTE ADULT - SUBJECTIVE AND OBJECTIVE BOX
DIABETES FOLLOW UP NOTE: Saw pt earlier today  INTERVAL HX: Pt stable, sleepy and feeling tired but able to answer all questions. Reports eating well with BGs at goal while on present insulin doses. No hypoglycemia. Pt is DNR      Review of Systems:  General: As above  Cardiovascular: No chest pain, palpitations  Respiratory: No SOB, no cough  GI: No nausea, vomiting, abdominal pain  Endocrine: no polyuria, polydipsia or S&Sx of hypoglycemia    Allergies    No Known Allergies    Intolerances      MEDICATIONS:  insulin glargine Injectable (LANTUS) 10 Unit(s) SubCutaneous at bedtime  insulin lispro (ADMELOG) corrective regimen sliding scale   SubCutaneous at bedtime  insulin lispro (ADMELOG) corrective regimen sliding scale   SubCutaneous three times a day before meals  insulin lispro Injectable (ADMELOG) 8 Unit(s) SubCutaneous three times a day with meals      PHYSICAL EXAM:  VITALS: T(C): 36.3 (08-13-21 @ 12:00)  T(F): 97.3 (08-13-21 @ 12:00), Max: 98.6 (08-13-21 @ 05:28)  HR: 91 (08-13-21 @ 12:00) (90 - 96)  BP: 157/74 (08-13-21 @ 12:00) (135/74 - 157/74)  RR:  (17 - 18)  SpO2:  (98% - 100%)  Wt(kg): --  GENERAL: in NAD  Abdomen: Soft, nontender, non distended  Extremities: Warm, no edema in all 4 exts  NEURO: A&O X3    LABS:  POCT Blood Glucose.: 160 mg/dL (08-13-21 @ 13:19)  POCT Blood Glucose.: 177 mg/dL (08-13-21 @ 09:10)  POCT Blood Glucose.: 130 mg/dL (08-12-21 @ 21:54)  POCT Blood Glucose.: 155 mg/dL (08-12-21 @ 18:02)  POCT Blood Glucose.: 192 mg/dL (08-12-21 @ 12:14)  POCT Blood Glucose.: 204 mg/dL (08-12-21 @ 09:11)  POCT Blood Glucose.: 237 mg/dL (08-11-21 @ 21:03)  POCT Blood Glucose.: 141 mg/dL (08-11-21 @ 16:59)  POCT Blood Glucose.: 98 mg/dL (08-11-21 @ 12:56)  POCT Blood Glucose.: 69 mg/dL (08-11-21 @ 12:31)  POCT Blood Glucose.: 81 mg/dL (08-11-21 @ 09:02)  POCT Blood Glucose.: 120 mg/dL (08-11-21 @ 02:03)  POCT Blood Glucose.: 179 mg/dL (08-10-21 @ 21:33)  POCT Blood Glucose.: 350 mg/dL (08-10-21 @ 17:11)      Thyroid Function Tests:  07-29 @ 19:56 TSH 2.45 FreeT4 -- T3 -- Anti TPO -- Anti Thyroglobulin Ab -- TSI --      A1C with Estimated Average Glucose Result: 6.5 % (07-28-21 @ 09:21) Skewed due to anemia      Estimated Average Glucose: 140 mg/dL (07-28-21 @ 09:21)        08-05 Chol 154 Direct LDL -- LDL calculated 93 HDL 28<L> Trig 165<H>

## 2021-08-13 NOTE — PROGRESS NOTE ADULT - PROBLEM SELECTOR PLAN 1
-test BG AC/HS.   - C/w  Lantus 10 units QHS.  - C/w Admelog 8 units AC meals for now. If BG less than 100mg/dl and pt eating, can give 4 units w/meal.   -c/w Admelog low correction scale AC and Low HS scale  Discharge plan:  TBD based on GFR. Noted GFR <30 so pt can't be on Metformin any more. Plan to [facility] so can continue with basal/bolus therapy. Doses TBD

## 2021-08-13 NOTE — PROGRESS NOTE ADULT - SUBJECTIVE AND OBJECTIVE BOX
Overnight events noted   VITAL: T(C): , Max: 37 (08-13-21 @ 05:28) T(F): , Max: 98.6 (08-13-21 @ 05:28) HR: 96 (08-13-21 @ 05:28) BP: 139/84 (08-13-21 @ 05:28) BP(mean): -- RR: 17 (08-13-21 @ 05:28) SpO2: 98% (08-13-21 @ 05:28)   PHYSICAL EXAM: Constitutional: alert; NAD HEENT: NCAT, DMM Neck: Supple, No JVD Respiratory: CTA-b/l Cardiovascular: RRR s1s2 Gastrointestinal: BS+, soft, NT/ND : (+)primafit Extremities: No peripheral edema b/l Neurological: no focal deficits; reduced generalized strength Back: no CVAT b/l Skin: No rashes, no nevi   IMPRESSION: 70F, Baptism, with breast CA, 7/27/21 p/w vaginal bleeding/severe anemia  (1)Renal - CKD/resolved superimposed ischemic ATN  (2)Hypernatremia - DI - controlled as of 8/9, with DDAVP (and in setting of patient having access to free water)  (3)ID - enterococcal bacteremia - completing course of IV Ampicillin tomorrow  (4)Anemia - severe - in setting of being a Baptism. Iron deficiency; receiving frequent doses of IV iron; s/p Retacrit 8/4 and 8/11 (today). Last bloodwork 8/9   RECOMMEND: (1)Meds as ordered/dose new meds for GFR 40-50ml/min (2)If bloodwork repeated, and Na is <140, then can stop DDAVP (3)Reconsult as needed       David Nixon MD [facility] Office: [phone] Cell: [phone]        No complaints   VITAL: T(C): , Max: 37 (08-13-21 @ 05:28) T(F): , Max: 98.6 (08-13-21 @ 05:28) HR: 96 (08-13-21 @ 05:28) BP: 139/84 (08-13-21 @ 05:28) BP(mean): -- RR: 17 (08-13-21 @ 05:28) SpO2: 98% (08-13-21 @ 05:28)   PHYSICAL EXAM: Constitutional: alert; NAD HEENT: NCAT, DMM Neck: Supple, No JVD Respiratory: CTA-b/l Cardiovascular: RRR s1s2 Gastrointestinal: BS+, soft, NT/ND : (+)primafit Extremities: No peripheral edema b/l Neurological: no focal deficits; reduced generalized strength Back: no CVAT b/l Skin: No rashes, no nevi   IMPRESSION: 70F, Spiritism, with breast CA, 7/27/21 p/w vaginal bleeding/severe anemia  (1)Renal - CKD/resolved superimposed ischemic ATN  (2)Hypernatremia - DI - controlled as of 8/9, with DDAVP (and in setting of patient having access to free water)  (3)ID - enterococcal bacteremia - completing course of IV Ampicillin tomorrow  (4)Anemia - severe - in setting of being a Spiritism. Iron deficiency; receiving frequent doses of IV iron; s/p Retacrit 8/4 and 8/11 (today). Last bloodwork 8/9   RECOMMEND: (1)Meds as ordered/dose new meds for GFR 40-50ml/min (2)If bloodwork repeated, and Na is <140, then can stop DDAVP (3)Reconsult as needed       David Nixon MD [facility] Office: [phone] Cell: [phone]

## 2021-08-13 NOTE — PROGRESS NOTE ADULT - PROBLEM SELECTOR PLAN 2
Iron transfusion   heme consulted.
Noted LDL > 70  (93)  -Consider statin since pt is above goal. However, based on pt's age and clinical condition primary team to consider use of statins only if it is consistent with patient's goals of care.    pager: [phone]   office:  [phone] (M-F 9a-5pm)               [phone] (nights/weekends)
- BP above goal but improving. Goal BP < 130/80  - management as per primary team.
Iron transfusion   heme consulted.
Iron transfusion   heme consulted.
Noted LDL > 70  (93)  -Consider statin since pt is above goal. However, based on pt's age and clinical condition primary team to consider use of statins only if it is consistent with patient's goals of care.
Iron transfusion   heme consulted.
- BP above goal but improving. Goal BP < 130/80  - management as per primary team.
- BP above goal, goal BP < 130/80  - management as per primary team.
Iron transfusion   heme consulted.
Noted LDL > 70  (93)  -Consider statin since pt is above goal. However, based on pt's age and clinical condition primary team to consider use of statins only if it is consistent with patient's goals of care.
Iron transfusion   heme consulted.
Noted LDL > 70  (93)  -Consider statin since pt is above goal. However, based on pt's age and clinical condition primary team to consider use of statins only if it is consistent with patient's goals of care.       discussed w/pt and RN  pager: [phone]   office:  [phone] (M-F 9a-5pm)               [phone] (nights/weekends)
Iron transfusion   heme consulted.
Noted LDL > 70  (93)  -Consider statin since pt is above goal. However, based on pt's age and clinical condition primary team to consider use of statins only if it is consistent with patient's goals of care.
Noted LDL > 70  (93)  -Consider statin since pt is above goal. However, based on pt's age and clinical condition primary team to consider use of statins only if it is consistent with patient's goals of care.    pager: [phone]   office:  [phone] (M-F 9a-5pm)               [phone] (nights/weekends)
Noted LDL > 70  (93)  -Consider statin since pt is above goal. However, based on pt's age and clinical condition primary team to consider use of statins only if it is consistent with patient's goals of care.
- BP above goal but improving. Goal BP < 130/80  - management as per primary team.

## 2021-08-13 NOTE — PROGRESS NOTE ADULT - ASSESSMENT
69 yo F Confucianist, hx of breast Ca on active chemo (unsure of name) and anemia p/w reported vaginal bleeding. Recent admission to [facility] for the same, underwent EPO treatment and discharged. Reported Hgb of 5 during admission. Feeling generally weak, denies chest pain sob. Reports bright red blood in the toilet when she urinates. Denies dysuria, abdominal pain. Patient is unable to receive blood products of any kind.  has been tachycardic.

## 2021-08-13 NOTE — PROGRESS NOTE ADULT - ASSESSMENT
69 yo F Episcopalian, hx of metastatic breast cancer, anemia p/w hematuria, started on CBI, c/b RRT/ code stroke, presumed ischemic CVA    #Anemia, iron deficiency  - pt is JW, does not accept blood products- established by patient per chart review prior to AMS  - received EPO 40, 000 unit 7/28; will hold on giving higher dose therapy (3x/week) as blood sparing treatment due to acute CVA  - s/p IV iron 200mg daily x 9 doses, for 1 further dose today  - continue folic acid, s/p IM B12  - LIMIT blood draws- no need for routine CBC; agree with Pediatric tubes  - s/p Retacrit 10,000 unit 8/4 and 8/11  - last Hg 3.8    #thrombocytopenia- plt fluctuating, not on AC, monitor    #hematuria-  - US/ CT with no intrauterine source, with + bladder hematoma on CBI, was clamped- restarted due to clots  - prior R nephroureteral catheter with mild residual r hydronephrosis; with soft tissue mass surrounding distal right ureter  - Urology is following  - was redosed tranexamic acid at 10mg/kg dose- 500mg IV over 30 minutes on 7/31 with critically low Hg with bleeding, despite risk of thrombosis  - has been off CBI; urine lighter, no clots    #Breast cancer, metastatic- now with widely metastatic disease  - recently established care at [facility], records in [facility] reviewed- initial dx 1996 R breast cancer s/p surgery, CMF, no endocrine therapy; had local recurrence, treated with endocrine agents  - developed Left breast cancer, s/p surgery, Aromasin  - malignant pleural effusion treated with femara/kisquali, followed by faslodex/ibrance; was following with Dr. 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

## 2021-08-13 NOTE — PROGRESS NOTE ADULT - ASSESSMENT
71 y/o F, Sikhism, w/h/o T2DM> HbA1c 6.5% (Skewed due to severe anemia) . DM c/b neuropath, CKD. Also h/o breast Ca on chemo (unsure of name) anemia, HTN, HLD. Here with severe anemia, acute blood loss 2/2 hematuria> unable to receive blood. Consult called for management of hyperglycemia. Tolerating POs with BG at goal while on present insulin doses. Per team pt going to [facility].

## 2021-08-13 NOTE — PROGRESS NOTE ADULT - PROBLEM SELECTOR PLAN 3
- Check lipid panel in AM and consider statin if LDL > 70 and it is consistent with patient's goals of care.     discussed w/pt and RN  pager: [phone]   office:  [phone] (M-F 9a-5pm)               [phone] (nights/weekends)
Urology following
- Check lipid panel in AM and consider statin if LDL > 70 and it is consistent with patient's goals of care
- Noted LDL > 70  (93)  -Consider statin since pt is above goal. However, based on pt's age and clinical condition primary team to consider use of statins only if it is consistent with patient's goals of care.
Urology following
- can check lipid panel in AM and consider statin if LDL > 70 and it is consistent with patient's goals of care
Urology following

## 2021-08-13 NOTE — PROGRESS NOTE ADULT - TIME-BASED BILLING (NON-CRITICAL CARE)
Time-based billing (NON-critical care)

## 2021-08-13 NOTE — CHART NOTE - NSCHARTNOTESELECT_GEN_ALL_CORE
Event Note
NGT out/Event Note
Nutrition Services
Nutrition Services
Critical Note/Event Note
Event Note
Neuro CODE STROKE/Event Note

## 2021-08-13 NOTE — PROGRESS NOTE ADULT - TIME BILLING
Plan of care: Adjusting insulin  Discharge plan  Follow up care
Advanced care planning was discussed with patient and family.  Advanced care planning forms were reviewed and discussed as appropriate.  Differential diagnosis and plan of care discussed with patient after the evaluation.   Pain assessed and judicious use of narcotics when appropriate was discussed.  Importance of Fall prevention discussed.  Counseling on Smoking and Alcohol cessation was offered when appropriate.  Counseling on Diet, exercise, and medication compliance was done.
-plan of care  -glycemic control
Advanced care planning was discussed with patient and family.  Advanced care planning forms were reviewed and discussed as appropriate.  Differential diagnosis and plan of care discussed with patient after the evaluation.   Pain assessed and judicious use of narcotics when appropriate was discussed.  Importance of Fall prevention discussed.  Counseling on Smoking and Alcohol cessation was offered when appropriate.  Counseling on Diet, exercise, and medication compliance was done.
Plan of care: Adjusting insulin  Discharge plan  Follow up care
Advanced care planning was discussed with patient and family.  Advanced care planning forms were reviewed and discussed as appropriate.  Differential diagnosis and plan of care discussed with patient after the evaluation.   Pain assessed and judicious use of narcotics when appropriate was discussed.  Importance of Fall prevention discussed.  Counseling on Smoking and Alcohol cessation was offered when appropriate.  Counseling on Diet, exercise, and medication compliance was done.
Advanced care planning was discussed with patient and family.  Advanced care planning forms were reviewed and discussed as appropriate.  Differential diagnosis and plan of care discussed with patient after the evaluation.   Pain assessed and judicious use of narcotics when appropriate was discussed.  Importance of Fall prevention discussed.  Counseling on Smoking and Alcohol cessation was offered when appropriate.  Counseling on Diet, exercise, and medication compliance was done.
Plan of care: Adjusting insulin  Discharge plan  Follow up care
Plan of care: Adjusting insulin  Diabetes education  Discharge plan  Follow up care
Advanced care planning was discussed with patient and family.  Advanced care planning forms were reviewed and discussed as appropriate.  Differential diagnosis and plan of care discussed with patient after the evaluation.   Pain assessed and judicious use of narcotics when appropriate was discussed.  Importance of Fall prevention discussed.  Counseling on Smoking and Alcohol cessation was offered when appropriate.  Counseling on Diet, exercise, and medication compliance was done.
Advanced care planning was discussed with patient and family.  Advanced care planning forms were reviewed and discussed as appropriate.  Differential diagnosis and plan of care discussed with patient after the evaluation.   Pain assessed and judicious use of narcotics when appropriate was discussed.  Importance of Fall prevention discussed.  Counseling on Smoking and Alcohol cessation was offered when appropriate.  Counseling on Diet, exercise, and medication compliance was done.
-plan of care  -glycemic control
Plan of care: Adjusting insulin  Discharge plan  Follow up care
Plan of care: Adjusting insulin  Discharge plan  Follow up care
-plan of care  -glycemic control
Advanced care planning was discussed with patient and family.  Advanced care planning forms were reviewed and discussed as appropriate.  Differential diagnosis and plan of care discussed with patient after the evaluation.   Pain assessed and judicious use of narcotics when appropriate was discussed.  Importance of Fall prevention discussed.  Counseling on Smoking and Alcohol cessation was offered when appropriate.  Counseling on Diet, exercise, and medication compliance was done.
Advanced care planning was discussed with patient and family.  Advanced care planning forms were reviewed and discussed as appropriate.  Differential diagnosis and plan of care discussed with patient after the evaluation.   Pain assessed and judicious use of narcotics when appropriate was discussed.  Importance of Fall prevention discussed.  Counseling on Smoking and Alcohol cessation was offered when appropriate.  Counseling on Diet, exercise, and medication compliance was done.
-plan of care  -glycemic control
Advanced care planning was discussed with patient and family.  Advanced care planning forms were reviewed and discussed as appropriate.  Differential diagnosis and plan of care discussed with patient after the evaluation.   Pain assessed and judicious use of narcotics when appropriate was discussed.  Importance of Fall prevention discussed.  Counseling on Smoking and Alcohol cessation was offered when appropriate.  Counseling on Diet, exercise, and medication compliance was done.
Plan of care: Adjusting insulin  Discharge plan  Follow up care

## 2021-09-30 NOTE — PROGRESS NOTE ADULT - PROBLEM SELECTOR PROBLEM 1
Personalized Preventive Plan for Milana Rolon - 9/30/2021  Medicare offers a range of preventive health benefits. Some of the tests and screenings are paid in full while other may be subject to a deductible, co-insurance, and/or copay. Some of these benefits include a comprehensive review of your medical history including lifestyle, illnesses that may run in your family, and various assessments and screenings as appropriate. After reviewing your medical record and screening and assessments performed today your provider may have ordered immunizations, labs, imaging, and/or referrals for you. A list of these orders (if applicable) as well as your Preventive Care list are included within your After Visit Summary for your review. Other Preventive Recommendations:    · A preventive eye exam performed by an eye specialist is recommended every 1-2 years to screen for glaucoma; cataracts, macular degeneration, and other eye disorders. · A preventive dental visit is recommended every 6 months. · Try to get at least 150 minutes of exercise per week or 10,000 steps per day on a pedometer . · Order or download the FREE \"Exercise & Physical Activity: Your Everyday Guide\" from [employer]. Call 4-667.587.1384 or search [employer] online. · You need 8503-2607 mg of calcium and 0339-8583 IU of vitamin D per day. It is possible to meet your calcium requirement with diet alone, but a vitamin D supplement is usually necessary to meet this goal.  · When exposed to the sun, use a sunscreen that protects against both UVA and UVB radiation with an SPF of 30 or greater. Reapply every 2 to 3 hours or after sweating, drying off with a towel, or swimming. · Always wear a seat belt when traveling in a car. Always wear a helmet when riding a bicycle or motorcycle.
Tachycardia
Type 2 diabetes mellitus with hyperglycemia, unspecified whether long term insulin use
Tachycardia
Type 2 diabetes mellitus with hyperglycemia, unspecified whether long term insulin use
Tachycardia
Tachycardia
Type 2 diabetes mellitus with hyperglycemia, unspecified whether long term insulin use
Tachycardia
Type 2 diabetes mellitus with hyperglycemia, unspecified whether long term insulin use
Tachycardia
Type 2 diabetes mellitus with hyperglycemia, unspecified whether long term insulin use
Type 2 diabetes mellitus with hyperglycemia, unspecified whether long term insulin use

## 2021-11-30 NOTE — STROKE CODE NOTE - NIH STROKE SCALE: 5B. MOTOR ARM, RIGHT, QM
(3) No effort against gravity; limb falls Body Location Override (Optional - Billing Will Still Be Based On Selected Body Map Location If Applicable): left medial pretibia

## 2022-04-09 NOTE — DISCHARGE NOTE NURSING/CASE MANAGEMENT/SOCIAL WORK - REASON VACCINE NOT RECEIVED
Problem: Activity Intolerance  Goal: # Functional status is maintained or returned to baseline  Outcome: Outcome Not Met, Continue to Monitor  Goal: # Tolerates activity for d/c setting with no clinical problems  Outcome: Outcome Not Met, Continue to Monitor     Problem: Respiratory Impairment - Respiratory Therapy 253  Goal: Demonstrates optimal level of respiratory function 1732  Outcome: Outcome Not Met, Continue to Monitor     Problem: Breathing Pattern Ineffective  Goal: Air exchange is effective, demonstrated by Sp02 sat of greater then or = 92% (or as ordered)  Outcome: Outcome Not Met, Continue to Monitor  Goal: Respiratory pattern is quiet and regular without report of SOB  Outcome: Outcome Not Met, Continue to Monitor  Goal: Breathing pattern demonstrates minimal apnea during sleep with appropriate use of airway pressure support devices  Outcome: Outcome Not Met, Continue to Monitor  Goal: Verbalizes/demonstrates effective breathing management strategies  Description: Document education using the patient education activity.   Outcome: Outcome Not Met, Continue to Monitor  Goal: Minimize respiratory effort related to dyspnea/shortness of breath (Hospice)  Outcome: Outcome Not Met, Continue to Monitor     Problem: Pneumonia  Goal: S/S of acute pneumonia are resolved  Description: If acute pneumonia is present, monitor for resolution of fever, cough, secretions and other test values based on presentation.  Outcome: Outcome Not Met, Continue to Monitor  Goal: Verbalizes understanding of pneumonia, treatment, and ongoing prevention  Description: Document on Patient Education Activity  Outcome: Outcome Not Met, Continue to Monitor         not available

## 2022-08-06 NOTE — PROGRESS NOTE ADULT - ASSESSMENT
[Joint Pains] : arthralgias [Back Pain] : ~T back pain  69 yo F [employer], hx of breast Ca on active chemo (unsure of name) and anemia p/w reported vaginal bleeding. Recent admission to [facility] for the same, underwent EPO treatment and discharged. Reported Hgb of 5 during admission. Feeling generally weak, denies chest pain sob. Reports bright red blood in the toilet when she urinates. Denies dysuria, abdominal pain. Patient is unable to receive blood products of any kind.  has been tachycardic.  [Appropriate Age Development] : development appropriate for age [Change in Activity] : no change in activity [Rash] : no rash [Heart Problems] : no heart problems [Congestion] : no congestion [Sleep Disturbances] : ~T no sleep disturbances

## 2022-10-18 NOTE — DIETITIAN INITIAL EVALUATION ADULT. - MALNUTRITION
Diabetes is improving with treatment.   Continue current treatment regimen.  Reminded to bring in blood sugar diary at next visit.  Diabetes will be reassessed 1 year   Moderate, chronic

## 2024-09-04 NOTE — SWALLOW BEDSIDE ASSESSMENT ADULT - MUCOSAL QUALITY
Chief Complaint   Patient presents with    Gastroesophageal Reflux     C/o indigestion in the evenings been going on for 2 years. Vomiting at times if she eats late.      Discuss Labs     Completed on 07/05/2024     Health Maintenance     Pap - scheduled with Dr. Coleman, Colonoscopy - due pt declined, pneumonia vaccine - pt declined      Medication Refill       HPI:  Patient is here for follow-up     Above noted    Increase in GERD symptoms    Allergy and Medications are reviewed and updated.  Past Medical History, Surgical History, and Family History has been reviewed and updated.    Review of Systems:  Review of Systems   Constitutional:  Negative for chills and fever.   HENT:  Negative for congestion, sinus pain and sore throat.    Eyes:  Negative for pain and discharge.   Respiratory:  Negative for shortness of breath (No new SOb).    Cardiovascular:  Negative for chest pain.   Gastrointestinal:  Negative for abdominal pain, blood in stool and nausea.   Genitourinary:  Negative for flank pain and frequency.   Musculoskeletal:  Negative for neck pain.   Hematological:  Does not bruise/bleed easily.   Psychiatric/Behavioral:  Negative for suicidal ideas.          Vitals:    09/04/24 1628   BP: 134/80   Position: Sitting   Pulse: 90   Temp: 98.4 °F (36.9 °C)   TempSrc: Temporal   SpO2: 99%   Weight: 103.6 kg (228 lb 8 oz)   Height: 1.727 m (5' 8\")       Physical Exam  Vitals reviewed.   Constitutional:       Appearance: She is well-developed.   HENT:      Head: Normocephalic and atraumatic.      Nose: Nose normal.   Eyes:      Conjunctiva/sclera: Conjunctivae normal.      Pupils: Pupils are equal, round, and reactive to light.   Neck:      Vascular: No JVD.   Cardiovascular:      Rate and Rhythm: Normal rate and regular rhythm.   Pulmonary:      Effort: Pulmonary effort is normal.      Breath sounds: Normal breath sounds.   Abdominal:      General: Bowel sounds are normal.      Palpations: Abdomen is soft.  WFL

## 2024-10-16 NOTE — ED ADULT NURSE NOTE - NS ED NURSE PATIENT LEFT UNIT TIME
Please follow-up the patient to see if she has any palpitations.  If she does she may need a 2-week event monitor.  She should follow-up with me in the next 2 to 3 months 03:13